# Patient Record
Sex: FEMALE | Race: WHITE | NOT HISPANIC OR LATINO | Employment: UNEMPLOYED | RURAL
[De-identification: names, ages, dates, MRNs, and addresses within clinical notes are randomized per-mention and may not be internally consistent; named-entity substitution may affect disease eponyms.]

---

## 2020-06-10 ENCOUNTER — HISTORICAL (OUTPATIENT)
Dept: ADMINISTRATIVE | Facility: HOSPITAL | Age: 35
End: 2020-06-10

## 2020-06-11 LAB
ADDRESS: NORMAL
ATTENDING PHYSICIAN NAME: NORMAL
COUNTY OF RESIDENCE: NORMAL
EMPLOYER NAME: NORMAL
FACILITY PHONE #: NORMAL
HX OF OCCUPATION: NORMAL
LEAD BLDV-MCNC: <1 MCG/DL (ref 0–4.9)
M HEALTH CARE PROVIDER PHONE: NORMAL
M PATIENT CITY: NORMAL
PHONE #: NORMAL
POSTAL CODE: NORMAL
PROVIDER CITY: NORMAL
PROVIDER POSTAL CODE: NORMAL
PROVIDER STATE: NORMAL
REFER PHYSICIAN ADDR: NORMAL
SPECIMEN SOURCE: NORMAL
STATE OF RESIDENCE: NORMAL

## 2020-06-16 LAB
LAB AP CLINICAL INFORMATION: NORMAL
LAB AP GENERAL CAT - HISTORICAL: NORMAL
LAB AP INTERPRETATION/RESULT - HISTORICAL: NORMAL
LAB AP SPECIMEN ADEQUACY - HISTORICAL: NORMAL
LAB AP SPECIMEN SUBMITTED - HISTORICAL: NORMAL

## 2020-09-03 ENCOUNTER — HISTORICAL (OUTPATIENT)
Dept: ADMINISTRATIVE | Facility: HOSPITAL | Age: 35
End: 2020-09-03

## 2020-10-05 ENCOUNTER — HISTORICAL (OUTPATIENT)
Dept: ADMINISTRATIVE | Facility: HOSPITAL | Age: 35
End: 2020-10-05

## 2020-10-05 LAB — GLUCOSE SERPL-MCNC: 129 MG/DL (ref 74–106)

## 2020-12-15 ENCOUNTER — HISTORICAL (OUTPATIENT)
Dept: ADMINISTRATIVE | Facility: HOSPITAL | Age: 35
End: 2020-12-15

## 2021-01-04 ENCOUNTER — HISTORICAL (OUTPATIENT)
Dept: ADMINISTRATIVE | Facility: HOSPITAL | Age: 36
End: 2021-01-04

## 2021-01-04 LAB
ALBUMIN SERPL BCP-MCNC: 2.1 G/DL (ref 3.5–5)
ALBUMIN/GLOB SERPL: 0.6 {RATIO}
ALP SERPL-CCNC: 158 U/L (ref 37–98)
ALT SERPL W P-5'-P-CCNC: 26 U/L (ref 13–56)
ANION GAP SERPL CALCULATED.3IONS-SCNC: 11 MMOL/L
AST SERPL W P-5'-P-CCNC: 19 U/L (ref 15–37)
BASOPHILS # BLD AUTO: 0.02 X10E3/UL (ref 0–0.2)
BASOPHILS NFR BLD AUTO: 0.2 % (ref 0–1)
BILIRUB SERPL-MCNC: 0.2 MG/DL (ref 0–1.2)
BILIRUB UR QL STRIP: NEGATIVE MG/DL
BUN SERPL-MCNC: 5 MG/DL (ref 7–18)
BUN/CREAT SERPL: 7.4
CALCIUM SERPL-MCNC: 8 MG/DL (ref 8.5–10.1)
CHLORIDE SERPL-SCNC: 108 MMOL/L (ref 98–107)
CLARITY UR: CLEAR
CO2 SERPL-SCNC: 24 MMOL/L (ref 21–32)
COLOR UR: YELLOW
CREAT SERPL-MCNC: 0.68 MG/DL (ref 0.55–1.02)
EOSINOPHIL # BLD AUTO: 0.16 X10E3/UL (ref 0–0.5)
EOSINOPHIL NFR BLD AUTO: 1.5 % (ref 1–4)
ERYTHROCYTE [DISTWIDTH] IN BLOOD BY AUTOMATED COUNT: 15.2 % (ref 11.5–14.5)
GLOBULIN SER-MCNC: 3.8 G/DL (ref 2–4)
GLUCOSE SERPL-MCNC: 121 MG/DL (ref 74–106)
GLUCOSE UR STRIP-MCNC: NEGATIVE MG/DL
HCT VFR BLD AUTO: 34.5 % (ref 38–47)
HGB BLD-MCNC: 10.9 G/DL (ref 12–16)
IMM GRANULOCYTES # BLD AUTO: 0.07 X10E3/UL (ref 0–0.04)
IMM GRANULOCYTES NFR BLD: 0.7 % (ref 0–0.4)
KETONES UR STRIP-SCNC: NEGATIVE MG/DL
LEUKOCYTE ESTERASE UR QL STRIP: NEGATIVE LEU/UL
LYMPHOCYTES # BLD AUTO: 2.33 X10E3/UL (ref 1–4.8)
LYMPHOCYTES NFR BLD AUTO: 21.9 % (ref 27–41)
MCH RBC QN AUTO: 27 PG (ref 27–31)
MCHC RBC AUTO-ENTMCNC: 31.6 G/DL (ref 32–36)
MCV RBC AUTO: 85.4 FL (ref 80–96)
MONOCYTES # BLD AUTO: 0.56 X10E3/UL (ref 0–0.8)
MONOCYTES NFR BLD AUTO: 5.3 % (ref 2–6)
MPC BLD CALC-MCNC: 10.4 FL (ref 9.4–12.4)
NEUTROPHILS # BLD AUTO: 7.5 X10E3/UL (ref 1.8–7.7)
NEUTROPHILS NFR BLD AUTO: 70.4 % (ref 53–65)
NITRITE UR QL STRIP: NEGATIVE
NRBC # BLD AUTO: 0 X10E3/UL (ref 0–0)
NRBC, AUTO (.00): 0 /100 (ref 0–0)
PH UR STRIP: 6 PH UNITS (ref 5–8)
PLATELET # BLD AUTO: 289 X10E3/UL (ref 150–400)
POTASSIUM SERPL-SCNC: 3.2 MMOL/L (ref 3.5–5.1)
PROT SERPL-MCNC: 5.9 G/DL (ref 6.4–8.2)
PROT UR QL STRIP: NEGATIVE MG/DL
RBC # BLD AUTO: 4.04 X10E6/UL (ref 4.2–5.4)
RBC # UR STRIP: NEGATIVE ERY/UL
SODIUM SERPL-SCNC: 140 MMOL/L (ref 136–145)
SP GR UR STRIP: 1.02 (ref 1–1.03)
URATE SERPL-MCNC: 4.8 MG/DL (ref 2.6–6)
UROBILINOGEN UR STRIP-ACNC: 0.2 EU/DL
WBC # BLD AUTO: 10.64 X10E3/UL (ref 4.5–11)

## 2021-01-14 ENCOUNTER — HISTORICAL (OUTPATIENT)
Dept: ADMINISTRATIVE | Facility: HOSPITAL | Age: 36
End: 2021-01-14

## 2021-01-20 ENCOUNTER — HISTORICAL (OUTPATIENT)
Dept: ADMINISTRATIVE | Facility: HOSPITAL | Age: 36
End: 2021-01-20

## 2021-01-20 LAB
HCT VFR BLD AUTO: 36.9 % (ref 38–47)
HGB BLD-MCNC: 11.3 G/DL (ref 12–16)
MCHC RBC AUTO-ENTMCNC: 30.6 G/DL (ref 32–36)

## 2021-01-21 ENCOUNTER — HISTORICAL (OUTPATIENT)
Dept: ADMINISTRATIVE | Facility: HOSPITAL | Age: 36
End: 2021-01-21

## 2021-01-21 LAB
BASOPHILS # BLD AUTO: 0.05 X10E3/UL (ref 0–0.2)
BASOPHILS NFR BLD AUTO: 0.4 % (ref 0–1)
EOSINOPHIL # BLD AUTO: 0.25 X10E3/UL (ref 0–0.5)
EOSINOPHIL NFR BLD AUTO: 1.8 % (ref 1–4)
ERYTHROCYTE [DISTWIDTH] IN BLOOD BY AUTOMATED COUNT: 16.6 % (ref 11.5–14.5)
HCT VFR BLD AUTO: 34.3 % (ref 38–47)
HGB BLD-MCNC: 10.4 G/DL (ref 12–16)
IMM GRANULOCYTES # BLD AUTO: 0.09 X10E3/UL (ref 0–0.04)
IMM GRANULOCYTES NFR BLD: 0.6 % (ref 0–0.4)
LYMPHOCYTES # BLD AUTO: 2.95 X10E3/UL (ref 1–4.8)
LYMPHOCYTES NFR BLD AUTO: 21 % (ref 27–41)
MCH RBC QN AUTO: 27.5 PG (ref 27–31)
MCHC RBC AUTO-ENTMCNC: 30.3 G/DL (ref 32–36)
MCV RBC AUTO: 90.7 FL (ref 80–96)
MONOCYTES # BLD AUTO: 0.73 X10E3/UL (ref 0–0.8)
MONOCYTES NFR BLD AUTO: 5.2 % (ref 2–6)
MPC BLD CALC-MCNC: 10.8 FL (ref 9.4–12.4)
NEUTROPHILS # BLD AUTO: 9.96 X10E3/UL (ref 1.8–7.7)
NEUTROPHILS NFR BLD AUTO: 71 % (ref 53–65)
NRBC # BLD AUTO: 0 X10E3/UL (ref 0–0)
NRBC, AUTO (.00): 0 /100 (ref 0–0)
PLATELET # BLD AUTO: 234 X10E3/UL (ref 150–400)
RBC # BLD AUTO: 3.78 X10E6/UL (ref 4.2–5.4)
WBC # BLD AUTO: 14.03 X10E3/UL (ref 4.5–11)

## 2021-01-22 LAB
ABO: NORMAL
ANTIBODY SCREEN: NORMAL
RH TYPE: NORMAL

## 2021-03-05 ENCOUNTER — HISTORICAL (OUTPATIENT)
Dept: ADMINISTRATIVE | Facility: HOSPITAL | Age: 36
End: 2021-03-05

## 2021-03-11 LAB
LAB AP CLINICAL INFORMATION: NORMAL
LAB AP GENERAL CAT - HISTORICAL: NORMAL
LAB AP INTERPRETATION/RESULT - HISTORICAL: NEGATIVE
LAB AP SPECIMEN ADEQUACY - HISTORICAL: NORMAL
LAB AP SPECIMEN SUBMITTED - HISTORICAL: NORMAL

## 2022-02-28 ENCOUNTER — OFFICE VISIT (OUTPATIENT)
Dept: OBSTETRICS AND GYNECOLOGY | Facility: CLINIC | Age: 37
End: 2022-02-28
Payer: MEDICAID

## 2022-02-28 ENCOUNTER — PROCEDURE VISIT (OUTPATIENT)
Dept: OBSTETRICS AND GYNECOLOGY | Facility: CLINIC | Age: 37
End: 2022-02-28
Payer: MEDICAID

## 2022-02-28 VITALS
HEART RATE: 65 BPM | DIASTOLIC BLOOD PRESSURE: 80 MMHG | SYSTOLIC BLOOD PRESSURE: 133 MMHG | TEMPERATURE: 98 F | HEIGHT: 69 IN | WEIGHT: 255 LBS | RESPIRATION RATE: 18 BRPM | BODY MASS INDEX: 37.77 KG/M2

## 2022-02-28 DIAGNOSIS — N91.1 SECONDARY AMENORRHEA: Primary | ICD-10-CM

## 2022-02-28 PROCEDURE — 81025 URINE PREGNANCY TEST: CPT | Mod: QW,,, | Performed by: OBSTETRICS & GYNECOLOGY

## 2022-02-28 PROCEDURE — 3075F SYST BP GE 130 - 139MM HG: CPT | Mod: CPTII,,, | Performed by: OBSTETRICS & GYNECOLOGY

## 2022-02-28 PROCEDURE — 76801 PR US, OB <14WKS, TRANSABD, SINGLE GESTATION: ICD-10-PCS | Mod: ,,, | Performed by: OBSTETRICS & GYNECOLOGY

## 2022-02-28 PROCEDURE — 99499 NO LOS: ICD-10-PCS | Mod: ,,, | Performed by: OBSTETRICS & GYNECOLOGY

## 2022-02-28 PROCEDURE — 0501F PRENATAL FLOW SHEET: CPT | Mod: ,,, | Performed by: OBSTETRICS & GYNECOLOGY

## 2022-02-28 PROCEDURE — 3079F PR MOST RECENT DIASTOLIC BLOOD PRESSURE 80-89 MM HG: ICD-10-PCS | Mod: CPTII,,, | Performed by: OBSTETRICS & GYNECOLOGY

## 2022-02-28 PROCEDURE — 76801 OB US < 14 WKS SINGLE FETUS: CPT | Mod: ,,, | Performed by: OBSTETRICS & GYNECOLOGY

## 2022-02-28 PROCEDURE — 81025 POCT URINE PREGNANCY: ICD-10-PCS | Mod: QW,,, | Performed by: OBSTETRICS & GYNECOLOGY

## 2022-02-28 PROCEDURE — 99499 UNLISTED E&M SERVICE: CPT | Mod: ,,, | Performed by: OBSTETRICS & GYNECOLOGY

## 2022-02-28 PROCEDURE — 1159F MED LIST DOCD IN RCRD: CPT | Mod: CPTII,,, | Performed by: OBSTETRICS & GYNECOLOGY

## 2022-02-28 PROCEDURE — 3079F DIAST BP 80-89 MM HG: CPT | Mod: CPTII,,, | Performed by: OBSTETRICS & GYNECOLOGY

## 2022-02-28 PROCEDURE — 3008F BODY MASS INDEX DOCD: CPT | Mod: CPTII,,, | Performed by: OBSTETRICS & GYNECOLOGY

## 2022-02-28 PROCEDURE — 1159F PR MEDICATION LIST DOCUMENTED IN MEDICAL RECORD: ICD-10-PCS | Mod: CPTII,,, | Performed by: OBSTETRICS & GYNECOLOGY

## 2022-02-28 PROCEDURE — 0501F PR INTITIAL PRENATAL CARE VISIT W/FLOW SHEET: ICD-10-PCS | Mod: ,,, | Performed by: OBSTETRICS & GYNECOLOGY

## 2022-02-28 PROCEDURE — 3075F PR MOST RECENT SYSTOLIC BLOOD PRESS GE 130-139MM HG: ICD-10-PCS | Mod: CPTII,,, | Performed by: OBSTETRICS & GYNECOLOGY

## 2022-02-28 PROCEDURE — 3008F PR BODY MASS INDEX (BMI) DOCUMENTED: ICD-10-PCS | Mod: CPTII,,, | Performed by: OBSTETRICS & GYNECOLOGY

## 2022-02-28 NOTE — PROGRESS NOTES
CC: Positive Pregnancy Test    HISTORY OF PRESENT ILLNESS:    Sidra Winter is a 36 y.o. female, ,  Presents today for a routine exam complaining of amenorrhea and positive home urine pregnancy test.  Patient's last menstrual period was 2022 (exact date).   She is not currently on any contraception.  Reports nausea. Reports breast tenderness. Denies vaginal bleeding and pelvic pain.      Review of patient's allergies indicates:   Allergen Reactions    Adhesive        Past Medical History:   Diagnosis Date    Scoliosis      Past Surgical History:   Procedure Laterality Date     SECTION  2021     OB History        1    Para   1    Term   1            AB        Living   1       SAB        IAB        Ectopic        Multiple        Live Births   1               Family History   Problem Relation Age of Onset    Diabetes Paternal Grandfather     Breast cancer Maternal Grandfather     Diabetes Maternal Grandfather     Ankylosing spondylitis Mother     Gestational diabetes Brother     Breast cancer Maternal Aunt      Social History     Tobacco Use    Smoking status: Former Smoker    Smokeless tobacco: Never Used   Substance Use Topics    Alcohol use: Not Currently     Comment: Socially    Drug use: Not Currently       No current outpatient medications on file.     No current facility-administered medications for this visit.       OB History    Para Term  AB Living   1 1 1     1   SAB IAB Ectopic Multiple Live Births           1      # Outcome Date GA Lbr Matt/2nd Weight Sex Delivery Anes PTL Lv   1 Term 21 39w0d  3.969 kg (8 lb 12 oz) F CS-Unspec   RODY          ROS:  GENERAL: No weight changes. No swelling. No fatigue. No fever.  CARDIOVASCULAR: No chest pain. No shortness of breath. No leg cramps.   NEUROLOGICAL: No headaches. No vision changes.  BREASTS: No pain. No lumps. No discharge.  ABDOMEN: No pain. No diarrhea. No  "constipation.  REPRODUCTIVE: No abnormal bleeding.   VULVA: No pain. No lesions. No itching.  VAGINA: No relaxation. No itching. No odor. No discharge. No lesions.  URINARY: No incontinence. No nocturia. No frequency. No dysuria.    /80 (BP Location: Right arm, Patient Position: Sitting)   Pulse 65   Temp 98.4 °F (36.9 °C)   Resp 18   Ht 5' 9" (1.753 m)   Wt 115.7 kg (255 lb)   LMP 01/13/2022 (Exact Date)   BMI 37.66 kg/m²     PE:  AFFECT: Calm, alert and oriented X 3. Interactive during exam  GENERAL: Appears well-nourished, well-developed, in no acute distress.  HEAD: Normocephalic, atruamatic  TEETH: Good dentition.  THYROID: No thyromegally   BREASTS: No masses, skin changes, nipple discharge or adenopathy bilaterally.  SKIN: Normal for race, warm, & dry. No lesions or rashes.  LUNGS: Easy and unlabored, clear to auscultation bilaterally.  HEART: Regular rate and rhythm   ABDOMEN: Soft and nontender without masses or organomegally.  VULVA: No lesions, masses or tenderness.  VAGINA: Moist and well rugated without lesions or discharge.  CERVIX: Moist and pink without lesions, discharge or tenderness.      UTERUS SIZE: 6 week size, nontender and without masses.  ADNEXA: No masses or tenderness.  ESTIMATE OF PELVIC CAPACITY: Adequate  EXTREMITIES: No cyanosis, clubbing or edema. No calf tenderness.  LYMPH NODES: No axillary or inguinal adenopathy.      ASSESSMENT:       ICD-10-CM ICD-9-CM    1. Secondary amenorrhea  N91.1 626.0 POCT urine pregnancy      Glucose, 1Hr Post Prandial      US OB <14 Wks, TransAbd, Single Gestation          Plan:     Amenorrhea  Positive urine pregnancy test (RAMESH: 10/20/2022, EGA: 6w 4d based on LMP)    -  Routine prenatal care    Nausea and vomiting in pregnancy    -  Education regarding lifestyle and dietary modifications    -  Advised use of B6/Unisom. Pt will notify us if no relief/worsening symptoms, will consider Zofran if needed.      1st TRIMESTER COUNSELING: " Discussed all, booklet provided:  Common complaints of pregnancy  HIV and other routine prenatal tests including  genetic screening  Risk factors identified by prenatal history  Oriented to practice - discussed anticipated course of prenatal care & indications for Ultrasound  Childbirth classes/Hospital facilities   Nutrition and weight gain counseling  Toxoplasmosis precautions (Cats/Raw Meat)  Sexual activity and exercise  Environmental/Work hazards  Travel  Tobacco (Ask, Advise, Assess, Assist, and Arrange), as well as alcohol and drug use  Use of any medications (Including supplements, Vitamins, Herbs, or OTC Drugs)  Domestic violence  Seat belt use      TERATOLOGY COUNSELING:   Discussed indications and options for aneuploidy screening - pamphlets given  Dating US today  FOLLOW-UP in 4 weeks with Dr. Mike Foster M.D., F[facility]    OB/GYN

## 2022-03-08 ENCOUNTER — PATIENT MESSAGE (OUTPATIENT)
Dept: OBSTETRICS AND GYNECOLOGY | Facility: CLINIC | Age: 37
End: 2022-03-08
Payer: MEDICAID

## 2022-03-28 ENCOUNTER — APPOINTMENT (OUTPATIENT)
Dept: RADIOLOGY | Facility: CLINIC | Age: 37
End: 2022-03-28
Attending: FAMILY MEDICINE
Payer: MEDICAID

## 2022-03-28 ENCOUNTER — OFFICE VISIT (OUTPATIENT)
Dept: OBSTETRICS AND GYNECOLOGY | Facility: CLINIC | Age: 37
End: 2022-03-28
Payer: MEDICAID

## 2022-03-28 ENCOUNTER — OFFICE VISIT (OUTPATIENT)
Dept: FAMILY MEDICINE | Facility: CLINIC | Age: 37
End: 2022-03-28
Payer: MEDICAID

## 2022-03-28 VITALS
HEART RATE: 76 BPM | TEMPERATURE: 98 F | HEIGHT: 69 IN | SYSTOLIC BLOOD PRESSURE: 128 MMHG | OXYGEN SATURATION: 99 % | DIASTOLIC BLOOD PRESSURE: 83 MMHG | WEIGHT: 255.19 LBS | BODY MASS INDEX: 37.8 KG/M2

## 2022-03-28 VITALS
SYSTOLIC BLOOD PRESSURE: 121 MMHG | WEIGHT: 253 LBS | DIASTOLIC BLOOD PRESSURE: 78 MMHG | HEIGHT: 69 IN | BODY MASS INDEX: 37.47 KG/M2 | HEART RATE: 70 BPM

## 2022-03-28 DIAGNOSIS — S99.922A FOOT INJURY, LEFT, INITIAL ENCOUNTER: ICD-10-CM

## 2022-03-28 DIAGNOSIS — Z72.51 RISK FOR SEXUALLY TRANSMITTED DISEASE: ICD-10-CM

## 2022-03-28 DIAGNOSIS — Z11.3 SCREENING FOR STDS (SEXUALLY TRANSMITTED DISEASES): ICD-10-CM

## 2022-03-28 DIAGNOSIS — S99.922A FOOT INJURY, LEFT, INITIAL ENCOUNTER: Primary | ICD-10-CM

## 2022-03-28 DIAGNOSIS — Z01.419 ROUTINE GYNECOLOGICAL EXAMINATION: Primary | ICD-10-CM

## 2022-03-28 DIAGNOSIS — Z12.4 SCREENING FOR MALIGNANT NEOPLASM OF THE CERVIX: ICD-10-CM

## 2022-03-28 DIAGNOSIS — Z11.4 SCREENING FOR HUMAN IMMUNODEFICIENCY VIRUS: ICD-10-CM

## 2022-03-28 LAB
B-HCG UR QL: POSITIVE
CANDIDA SPECIES: NEGATIVE
CTP QC/QA: YES
GARDNERELLA: NEGATIVE
HAV IGM SER QL: NORMAL
HBV CORE IGM SER QL: NORMAL
HBV SURFACE AG SERPL QL IA: NORMAL
HCV AB SER QL: NORMAL
HIV 1+O+2 AB SERPL QL: NORMAL
SYPHILIS AB INTERPRETATION: NORMAL
TRICHOMONAS: NEGATIVE

## 2022-03-28 PROCEDURE — 3008F PR BODY MASS INDEX (BMI) DOCUMENTED: ICD-10-PCS | Mod: CPTII,,, | Performed by: OBSTETRICS & GYNECOLOGY

## 2022-03-28 PROCEDURE — 3078F PR MOST RECENT DIASTOLIC BLOOD PRESSURE < 80 MM HG: ICD-10-PCS | Mod: CPTII,,, | Performed by: OBSTETRICS & GYNECOLOGY

## 2022-03-28 PROCEDURE — 87480 BACTERIAL VAGINOSIS: ICD-10-PCS | Mod: ,,, | Performed by: CLINICAL MEDICAL LABORATORY

## 2022-03-28 PROCEDURE — 87510 GARDNER VAG DNA DIR PROBE: CPT | Mod: ,,, | Performed by: CLINICAL MEDICAL LABORATORY

## 2022-03-28 PROCEDURE — 87591 N.GONORRHOEAE DNA AMP PROB: CPT | Mod: ,,, | Performed by: CLINICAL MEDICAL LABORATORY

## 2022-03-28 PROCEDURE — 87480 CANDIDA DNA DIR PROBE: CPT | Mod: ,,, | Performed by: CLINICAL MEDICAL LABORATORY

## 2022-03-28 PROCEDURE — 1159F MED LIST DOCD IN RCRD: CPT | Mod: CPTII,,, | Performed by: OBSTETRICS & GYNECOLOGY

## 2022-03-28 PROCEDURE — 3008F PR BODY MASS INDEX (BMI) DOCUMENTED: ICD-10-PCS | Mod: ,,, | Performed by: FAMILY MEDICINE

## 2022-03-28 PROCEDURE — 3078F DIAST BP <80 MM HG: CPT | Mod: CPTII,,, | Performed by: OBSTETRICS & GYNECOLOGY

## 2022-03-28 PROCEDURE — 1159F PR MEDICATION LIST DOCUMENTED IN MEDICAL RECORD: ICD-10-PCS | Mod: CPTII,,, | Performed by: OBSTETRICS & GYNECOLOGY

## 2022-03-28 PROCEDURE — 80074 HEPATITIS PANEL, ACUTE: ICD-10-PCS | Mod: ,,, | Performed by: CLINICAL MEDICAL LABORATORY

## 2022-03-28 PROCEDURE — 87510 BACTERIAL VAGINOSIS: ICD-10-PCS | Mod: ,,, | Performed by: CLINICAL MEDICAL LABORATORY

## 2022-03-28 PROCEDURE — 36415 PR COLLECTION VENOUS BLOOD,VENIPUNCTURE: ICD-10-PCS | Mod: 90,,, | Performed by: OBSTETRICS & GYNECOLOGY

## 2022-03-28 PROCEDURE — 3074F PR MOST RECENT SYSTOLIC BLOOD PRESSURE < 130 MM HG: ICD-10-PCS | Mod: ,,, | Performed by: FAMILY MEDICINE

## 2022-03-28 PROCEDURE — 86780 TREPONEMA PALLIDUM: CPT | Mod: ,,, | Performed by: CLINICAL MEDICAL LABORATORY

## 2022-03-28 PROCEDURE — 3008F BODY MASS INDEX DOCD: CPT | Mod: CPTII,,, | Performed by: OBSTETRICS & GYNECOLOGY

## 2022-03-28 PROCEDURE — 86780 TREPONEMA PALLIDUM (SYPHILIS) ANTIBODY: ICD-10-PCS | Mod: ,,, | Performed by: CLINICAL MEDICAL LABORATORY

## 2022-03-28 PROCEDURE — 73630 X-RAY EXAM OF FOOT: CPT | Mod: TC,RHCUB,FY,LT | Performed by: FAMILY MEDICINE

## 2022-03-28 PROCEDURE — 36415 COLL VENOUS BLD VENIPUNCTURE: CPT | Mod: 90,,, | Performed by: OBSTETRICS & GYNECOLOGY

## 2022-03-28 PROCEDURE — 80074 ACUTE HEPATITIS PANEL: CPT | Mod: ,,, | Performed by: CLINICAL MEDICAL LABORATORY

## 2022-03-28 PROCEDURE — 99214 OFFICE O/P EST MOD 30 MIN: CPT | Mod: ,,, | Performed by: OBSTETRICS & GYNECOLOGY

## 2022-03-28 PROCEDURE — 3008F BODY MASS INDEX DOCD: CPT | Mod: ,,, | Performed by: FAMILY MEDICINE

## 2022-03-28 PROCEDURE — 87591 CHLAMYDIA/GONORRHOEAE(GC), PCR: ICD-10-PCS | Mod: ,,, | Performed by: CLINICAL MEDICAL LABORATORY

## 2022-03-28 PROCEDURE — 87389 HIV 1 / 2 ANTIBODY: ICD-10-PCS | Mod: ,,, | Performed by: CLINICAL MEDICAL LABORATORY

## 2022-03-28 PROCEDURE — 99214 PR OFFICE/OUTPT VISIT, EST, LEVL IV, 30-39 MIN: ICD-10-PCS | Mod: ,,, | Performed by: OBSTETRICS & GYNECOLOGY

## 2022-03-28 PROCEDURE — 3074F SYST BP LT 130 MM HG: CPT | Mod: CPTII,,, | Performed by: OBSTETRICS & GYNECOLOGY

## 2022-03-28 PROCEDURE — 87660 TRICHOMONAS VAGIN DIR PROBE: CPT | Mod: ,,, | Performed by: CLINICAL MEDICAL LABORATORY

## 2022-03-28 PROCEDURE — 87624 HUMAN PAPILLOMAVIRUS (HPV): ICD-10-PCS | Mod: ,,, | Performed by: CLINICAL MEDICAL LABORATORY

## 2022-03-28 PROCEDURE — 87660 BACTERIAL VAGINOSIS: ICD-10-PCS | Mod: ,,, | Performed by: CLINICAL MEDICAL LABORATORY

## 2022-03-28 PROCEDURE — 3074F SYST BP LT 130 MM HG: CPT | Mod: ,,, | Performed by: FAMILY MEDICINE

## 2022-03-28 PROCEDURE — 99213 PR OFFICE/OUTPT VISIT, EST, LEVL III, 20-29 MIN: ICD-10-PCS | Mod: ,,, | Performed by: FAMILY MEDICINE

## 2022-03-28 PROCEDURE — 87624 HPV HI-RISK TYP POOLED RSLT: CPT | Mod: ,,, | Performed by: CLINICAL MEDICAL LABORATORY

## 2022-03-28 PROCEDURE — 73630 X-RAY EXAM OF FOOT: CPT | Mod: 26,LT,, | Performed by: RADIOLOGY

## 2022-03-28 PROCEDURE — 87389 HIV-1 AG W/HIV-1&-2 AB AG IA: CPT | Mod: ,,, | Performed by: CLINICAL MEDICAL LABORATORY

## 2022-03-28 PROCEDURE — 87491 CHLMYD TRACH DNA AMP PROBE: CPT | Mod: ,,, | Performed by: CLINICAL MEDICAL LABORATORY

## 2022-03-28 PROCEDURE — 73630 XR FOOT COMPLETE 3 VIEW LEFT: ICD-10-PCS | Mod: 26,LT,, | Performed by: RADIOLOGY

## 2022-03-28 PROCEDURE — 99213 OFFICE O/P EST LOW 20 MIN: CPT | Mod: ,,, | Performed by: FAMILY MEDICINE

## 2022-03-28 PROCEDURE — 3074F PR MOST RECENT SYSTOLIC BLOOD PRESSURE < 130 MM HG: ICD-10-PCS | Mod: CPTII,,, | Performed by: OBSTETRICS & GYNECOLOGY

## 2022-03-28 PROCEDURE — 87491 CHLAMYDIA/GONORRHOEAE(GC), PCR: ICD-10-PCS | Mod: ,,, | Performed by: CLINICAL MEDICAL LABORATORY

## 2022-03-28 NOTE — PROGRESS NOTES
"CC: Well woman exam    Sidra Winter is a 36 y.o. female  presents for well woman exam.  LMP: No LMP recorded..    Past Medical History:   Diagnosis Date    Scoliosis      Past Surgical History:   Procedure Laterality Date     SECTION  2021     Social History     Socioeconomic History    Marital status: Single   Tobacco Use    Smoking status: Former Smoker    Smokeless tobacco: Never Used   Substance and Sexual Activity    Alcohol use: Not Currently     Comment: Socially    Drug use: Not Currently    Sexual activity: Yes     Partners: Male     Birth control/protection: None     Family History   Problem Relation Age of Onset    Diabetes Paternal Grandfather     Breast cancer Maternal Grandfather     Diabetes Maternal Grandfather     Ankylosing spondylitis Mother     Gestational diabetes Brother     Breast cancer Maternal Aunt      OB History        1    Para   1    Term   1            AB        Living   1       SAB        IAB        Ectopic        Multiple        Live Births   1                 /78 (BP Location: Right arm, Patient Position: Sitting)   Pulse 70   Ht 5' 9" (1.753 m)   Wt 114.8 kg (253 lb)   BMI 37.36 kg/m²       ROS:  GENERAL: Denies weight gain or weight loss. Feeling well overall.   SKIN: Denies rash or lesions.   HEAD: Denies head injury or headache.   NODES: Denies enlarged lymph nodes.   CHEST: Denies chest pain or shortness of breath.   CARDIOVASCULAR: Denies palpitations or left sided chest pain.   ABDOMEN: No abdominal pain, constipation, diarrhea, nausea, vomiting or rectal bleeding.   URINARY: No frequency, dysuria, hematuria, or burning on urination.  REPRODUCTIVE: See HPI.   BREASTS: The patient performs breast self-examination and denies pain, lumps, or nipple discharge.   HEMATOLOGIC: No easy bruisability or excessive bleeding.   MUSCULOSKELETAL: Denies joint pain or swelling.   NEUROLOGIC: Denies syncope or weakness. "   PSYCHIATRIC: Denies depression, anxiety or mood swings.    PHYSICAL EXAM:  APPEARANCE: Well nourished, well developed, in no acute distress.  AFFECT: WNL, alert and oriented x 3  SKIN: No acne or hirsutism  NECK: Neck symmetric without masses or thyromegaly  NODES: No inguinal, cervical, axillary, or femoral lymph node enlargement  CHEST: Good respiratory effect  ABDOMEN: Soft.  No tenderness or masses.  No hepatosplenomegaly.  No hernias.  BREASTS: Symmetrical, no skin changes or visible lesions.  No palpable masses, nipple discharge bilaterally.  PELVIC: Normal external genitalia without lesions.  Normal hair distribution.  Adequate perineal body, normal urethral meatus.  Vagina moist and well rugated without lesions or discharge.  Cervix pink, without lesions, discharge or tenderness.  No significant cystocele or rectocele.  Bimanual exam shows uterus to be normal size, regular, mobile and nontender.  Adnexa without masses or tenderness.    EXTREMITIES: No edema.    Routine gynecological examination  -     ThinPrep Pap Test; Future; Expected date: 03/28/2022    Screening for malignant neoplasm of the cervix  -     ThinPrep Pap Test; Future; Expected date: 03/28/2022    Screening for STDs (sexually transmitted diseases)  -     Treponema Pallidum (Syphillis) Antibody; Future; Expected date: 03/28/2022  -     HIV 1/2 Ag/Ab (4th Gen); Future; Expected date: 03/28/2022  -     Hepatitis Panel, Acute; Future; Expected date: 03/28/2022  -     Chlamydia/GC, PCR; Future; Expected date: 03/28/2022  -     Bacterial Vaginosis; Future; Expected date: 03/28/2022    Screening for human immunodeficiency virus  -     HIV 1/2 Ag/Ab (4th Gen); Future; Expected date: 03/28/2022    Risk for sexually transmitted disease  -     Treponema Pallidum (Syphillis) Antibody; Future; Expected date: 03/28/2022  -     HIV 1/2 Ag/Ab (4th Gen); Future; Expected date: 03/28/2022  -     Hepatitis Panel, Acute; Future; Expected date: 03/28/2022  -      Chlamydia/GC, PCR; Future; Expected date: 03/28/2022  -     Bacterial Vaginosis; Future; Expected date: 03/28/2022    Dyspareunia, female    Retroverted uterus in first trimester        Patient was counseled today on A.C.S. Pap guidelines and recommendations for yearly pelvic exams, mammograms and monthly self breast exams; to see her PCP for other health maintenance.   Exercise regimen encouraged  Healthy food choices encouraged  Questions answered to desired level of satisfaction  Verbalized understanding to all information and instructions    Follow up in about 1 year (around 3/28/2023) for Annual Exam.

## 2022-03-28 NOTE — PROGRESS NOTES
Braxton Ornelas MD   [facility]  [address] [place] [address]     PATIENT NAME: Sidra Winter  : 1985  DATE: 3/28/22  MRN: [id number]      Billing Provider: Braxton Ornelas MD  Level of Service: ID OFFICE/OUTPT VISIT, EST, LEVL III, 20-29 MIN  Patient PCP Information     Provider PCP Type    Braxton Ornelas MD General          Reason for Visit / Chief Complaint: Foot Injury (Left 2nd toe bruised from falling over a toy Saturday evening. )         History of Present Illness / Problem Focused Workflow     Sidra Winter presents to the clinic with Foot Injury (Left 2nd toe bruised from falling over a toy Saturday evening. )     HPI    Review of Systems     Review of Systems   Constitutional: Negative for activity change, appetite change, fatigue and fever.   HENT: Negative for nasal congestion, ear pain, hearing loss, sinus pressure/congestion and sore throat.    Respiratory: Negative for cough, chest tightness and shortness of breath.    Cardiovascular: Negative for chest pain and palpitations.   Gastrointestinal: Negative for abdominal pain and fecal incontinence.   Genitourinary: Negative for bladder incontinence and difficulty urinating.   Musculoskeletal: Positive for gait problem and joint swelling (left toes). Negative for arthralgias.   Integumentary:  Negative for rash.   Neurological: Negative for dizziness and headaches.        Medical / Social / Family History     Past Medical History:   Diagnosis Date    Scoliosis        Past Surgical History:   Procedure Laterality Date     SECTION  2021       Social History  Sidra Winter  reports that she has quit smoking. She has never used smokeless tobacco. She reports previous alcohol use. She reports previous drug use.    Family History  Sidra Winter  family history includes Ankylosing spondylitis in her mother; Breast cancer in her maternal aunt and maternal grandfather;  Diabetes in her maternal grandfather and paternal grandfather; Gestational diabetes in her brother.    Medications and Allergies     Medications  Outpatient Medications Marked as Taking for the 3/28/22 encounter (Office Visit) with Braxton Ornelas MD   Medication Sig Dispense Refill    prenatal 25/iron fum/folic/dha (PRENATAL-1 ORAL) Take by mouth.         Allergies  Review of patient's allergies indicates:   Allergen Reactions    Adhesive        Physical Examination     Vitals:    03/28/22 1331   BP: 128/83   Pulse: 76   Temp: 97.7 °F (36.5 °C)     Physical Exam  Constitutional:       General: She is not in acute distress.     Appearance: She is not ill-appearing.   HENT:      Head: Normocephalic and atraumatic.      Right Ear: Tympanic membrane and ear canal normal.      Left Ear: Tympanic membrane and ear canal normal.      Nose: Nose normal. No congestion or rhinorrhea.   Eyes:      Pupils: Pupils are equal, round, and reactive to light.   Cardiovascular:      Rate and Rhythm: Normal rate and regular rhythm.      Pulses: Normal pulses.      Heart sounds: No murmur heard.  Pulmonary:      Effort: No respiratory distress.      Breath sounds: No wheezing, rhonchi or rales.   Abdominal:      General: Bowel sounds are normal.      Palpations: Abdomen is soft.      Tenderness: There is no abdominal tenderness.      Hernia: No hernia is present.   Musculoskeletal:         General: Deformity (left 2nd and 3rd toes bruizing at MTPs. tender to touch) present.      Cervical back: Normal range of motion and neck supple.   Lymphadenopathy:      Cervical: No cervical adenopathy.   Skin:     General: Skin is warm and dry.   Neurological:      Mental Status: She is alert.   Psychiatric:         Behavior: Behavior normal.         Thought Content: Thought content normal.          Assessment and Plan (including Health Maintenance)   :    Plan:         Health Maintenance Due   Topic Date Due    Hepatitis C Screening  Never  done    Cervical Cancer Screening  Never done    Lipid Panel  Never done    COVID-19 Vaccine (1) Never done    HIV Screening  Never done    TETANUS VACCINE  Never done    Influenza Vaccine (1) Never done       Problem List Items Addressed This Visit    None     Visit Diagnoses     Foot injury, left, initial encounter    -  Primary    Relevant Orders    X-Ray Foot Complete Left (Completed)        Foot injury, left, initial encounter  -     X-Ray Foot Complete Left; Future; Expected date: 03/28/2022       The patient has no Health Maintenance topics of status Not Due    Procedures     Future Appointments   Date Time Provider Department Center   4/11/2022  8:30 AM Mario Foster MD [facility]   4/4/2023  8:00 AM [facility]        No follow-ups on file.  No fracture. Symptomatic treatment    Signature:  Braxton Ornelas MD  [facility]  [address] [place] [address]  [phone] Phone  [phone] Fax    Date of encounter: 3/28/22

## 2022-03-29 LAB
CHLAMYDIA BY PCR: NEGATIVE
N. GONORRHOEAE (GC) BY PCR: NEGATIVE

## 2022-04-01 LAB
GH SERPL-MCNC: NORMAL NG/ML
INSULIN SERPL-ACNC: NORMAL U[IU]/ML
LAB AP CLINICAL INFORMATION: NORMAL
LAB AP GYN INTERPRETATION: NEGATIVE
LAB AP PAP DISCLAIMER COMMENTS: NORMAL
RENIN PLAS-CCNC: NORMAL NG/ML/H

## 2022-04-04 LAB
HPV 16: NEGATIVE
HPV 18: NEGATIVE
HPV OTHER: NEGATIVE

## 2022-04-11 ENCOUNTER — PROCEDURE VISIT (OUTPATIENT)
Dept: OBSTETRICS AND GYNECOLOGY | Facility: CLINIC | Age: 37
End: 2022-04-11
Payer: MEDICAID

## 2022-04-11 ENCOUNTER — ROUTINE PRENATAL (OUTPATIENT)
Dept: OBSTETRICS AND GYNECOLOGY | Facility: CLINIC | Age: 37
End: 2022-04-11
Payer: MEDICAID

## 2022-04-11 VITALS
DIASTOLIC BLOOD PRESSURE: 85 MMHG | BODY MASS INDEX: 37.18 KG/M2 | SYSTOLIC BLOOD PRESSURE: 125 MMHG | HEART RATE: 70 BPM | WEIGHT: 251.81 LBS

## 2022-04-11 DIAGNOSIS — Z36.9 UNSPECIFIED ANTENATAL SCREENING: ICD-10-CM

## 2022-04-11 DIAGNOSIS — O34.219 HISTORY OF CESAREAN DELIVERY, CURRENTLY PREGNANT: ICD-10-CM

## 2022-04-11 DIAGNOSIS — N91.1 SECONDARY AMENORRHEA: Primary | ICD-10-CM

## 2022-04-11 DIAGNOSIS — Z72.51 RISK FOR SEXUALLY TRANSMITTED DISEASE: ICD-10-CM

## 2022-04-11 DIAGNOSIS — Z11.3 SCREENING FOR STDS (SEXUALLY TRANSMITTED DISEASES): ICD-10-CM

## 2022-04-11 DIAGNOSIS — Z36.89 ENCOUNTER FOR OTHER SPECIFIED ANTENATAL SCREENING: ICD-10-CM

## 2022-04-11 DIAGNOSIS — Z11.4 SCREENING FOR HUMAN IMMUNODEFICIENCY VIRUS: ICD-10-CM

## 2022-04-11 DIAGNOSIS — Z3A.12 12 WEEKS GESTATION OF PREGNANCY: ICD-10-CM

## 2022-04-11 DIAGNOSIS — Z32.01 POSITIVE URINE PREGNANCY TEST: Primary | ICD-10-CM

## 2022-04-11 LAB
BASOPHILS # BLD AUTO: 0.04 K/UL (ref 0–0.2)
BASOPHILS NFR BLD AUTO: 0.5 % (ref 0–1)
BILIRUB SERPL-MCNC: NORMAL MG/DL
BLOOD, POC UA: NORMAL
CTP QC/QA: YES
DIFFERENTIAL METHOD BLD: ABNORMAL
EOSINOPHIL # BLD AUTO: 0.12 K/UL (ref 0–0.5)
EOSINOPHIL NFR BLD AUTO: 1.4 % (ref 1–4)
ERYTHROCYTE [DISTWIDTH] IN BLOOD BY AUTOMATED COUNT: 16 % (ref 11.5–14.5)
EST. AVERAGE GLUCOSE BLD GHB EST-MCNC: 94 MG/DL
GLUCOSE SERPL-MCNC: 105 MG/DL
GLUCOSE UR QL STRIP: NORMAL
HBA1C MFR BLD HPLC: 5.4 % (ref 4.5–6.6)
HBV SURFACE AG SERPL QL IA: NORMAL
HCT VFR BLD AUTO: 39 % (ref 38–47)
HGB BLD-MCNC: 11.9 G/DL (ref 12–16)
HIV 1+O+2 AB SERPL QL: NORMAL
IMM GRANULOCYTES # BLD AUTO: 0.03 K/UL (ref 0–0.04)
IMM GRANULOCYTES NFR BLD: 0.3 % (ref 0–0.4)
KETONES UR QL STRIP: NORMAL
LEUKOCYTE ESTERASE URINE, POC: NORMAL
LYMPHOCYTES # BLD AUTO: 2.4 K/UL (ref 1–4.8)
LYMPHOCYTES NFR BLD AUTO: 27.8 % (ref 27–41)
MCH RBC QN AUTO: 25.9 PG (ref 27–31)
MCHC RBC AUTO-ENTMCNC: 30.5 G/DL (ref 32–36)
MCV RBC AUTO: 84.8 FL (ref 80–96)
MONOCYTES # BLD AUTO: 0.44 K/UL (ref 0–0.8)
MONOCYTES NFR BLD AUTO: 5.1 % (ref 2–6)
MPC BLD CALC-MCNC: 10.6 FL (ref 9.4–12.4)
NEUTROPHILS # BLD AUTO: 5.6 K/UL (ref 1.8–7.7)
NEUTROPHILS NFR BLD AUTO: 64.9 % (ref 53–65)
NITRITE, POC UA: NORMAL
NRBC # BLD AUTO: 0 X10E3/UL
NRBC, AUTO (.00): 0 %
PH, POC UA: 6
PLATELET # BLD AUTO: 373 K/UL (ref 150–400)
POC (AMP) AMPHETAMINE: NEGATIVE
POC (BAR) BARBITURATES: NEGATIVE
POC (BUP) BUPRENORPHINE: NEGATIVE
POC (BZO) BENZODIAZEPINES: NEGATIVE
POC (COC) COCAINE: NEGATIVE
POC (MDMA) METHYLENEDIOXYMETHAMPHETAMINE 3,4: NEGATIVE
POC (MET) METHAMPHETAMINE: NEGATIVE
POC (MOP) OPIATES: NEGATIVE
POC (MTD) METHADONE: NEGATIVE
POC (OXY) OXYCODONE: NEGATIVE
POC (PCP) PHENCYCLIDINE: NEGATIVE
POC (TCA) TRICYCLIC ANTIDEPRESSANTS: NEGATIVE
POC TEMPERATURE (URINE): 92
POC THC: NEGATIVE
PROTEIN, POC: NORMAL
RBC # BLD AUTO: 4.6 M/UL (ref 4.2–5.4)
RUBV IGG SER-ACNC: NORMAL [IU]/ML
SPECIFIC GRAVITY, POC UA: 1.01
SYPHILIS AB INTERPRETATION: NORMAL
UROBILINOGEN, POC UA: 0.2
WBC # BLD AUTO: 8.63 K/UL (ref 4.5–11)

## 2022-04-11 PROCEDURE — 87591 CHLAMYDIA/GONORRHOEAE(GC), PCR: ICD-10-PCS | Mod: ,,, | Performed by: CLINICAL MEDICAL LABORATORY

## 2022-04-11 PROCEDURE — 86900 TYPE & SCREEN: ICD-10-PCS | Mod: ,,, | Performed by: CLINICAL MEDICAL LABORATORY

## 2022-04-11 PROCEDURE — 86780 TREPONEMA PALLIDUM: CPT | Mod: ,,, | Performed by: CLINICAL MEDICAL LABORATORY

## 2022-04-11 PROCEDURE — 86762 RUBELLA ANTIBODY SCREEN: ICD-10-PCS | Mod: ,,, | Performed by: CLINICAL MEDICAL LABORATORY

## 2022-04-11 PROCEDURE — 86780 TREPONEMA PALLIDUM (SYPHILIS) ANTIBODY: ICD-10-PCS | Mod: ,,, | Performed by: CLINICAL MEDICAL LABORATORY

## 2022-04-11 PROCEDURE — 76801 OB US < 14 WKS SINGLE FETUS: CPT | Mod: ,,, | Performed by: OBSTETRICS & GYNECOLOGY

## 2022-04-11 PROCEDURE — 87389 HIV-1 AG W/HIV-1&-2 AB AG IA: CPT | Mod: ,,, | Performed by: CLINICAL MEDICAL LABORATORY

## 2022-04-11 PROCEDURE — 87661 TRICHOMONAS VAGINALIS AMPLIF: CPT | Mod: ,,, | Performed by: CLINICAL MEDICAL LABORATORY

## 2022-04-11 PROCEDURE — 86850 RBC ANTIBODY SCREEN: CPT | Mod: ,,, | Performed by: CLINICAL MEDICAL LABORATORY

## 2022-04-11 PROCEDURE — 87340 HEPATITIS B SURFACE ANTIGEN: ICD-10-PCS | Mod: ,,, | Performed by: CLINICAL MEDICAL LABORATORY

## 2022-04-11 PROCEDURE — 86901 TYPE & SCREEN: ICD-10-PCS | Mod: ,,, | Performed by: CLINICAL MEDICAL LABORATORY

## 2022-04-11 PROCEDURE — 87491 CHLAMYDIA/GONORRHOEAE(GC), PCR: ICD-10-PCS | Mod: ,,, | Performed by: CLINICAL MEDICAL LABORATORY

## 2022-04-11 PROCEDURE — 87491 CHLMYD TRACH DNA AMP PROBE: CPT | Mod: ,,, | Performed by: CLINICAL MEDICAL LABORATORY

## 2022-04-11 PROCEDURE — 86762 RUBELLA ANTIBODY: CPT | Mod: ,,, | Performed by: CLINICAL MEDICAL LABORATORY

## 2022-04-11 PROCEDURE — 82950 GLUCOSE, 1HR POST PRANDIAL: ICD-10-PCS | Mod: ,,, | Performed by: CLINICAL MEDICAL LABORATORY

## 2022-04-11 PROCEDURE — 82950 GLUCOSE TEST: CPT | Mod: ,,, | Performed by: CLINICAL MEDICAL LABORATORY

## 2022-04-11 PROCEDURE — 80305 DRUG TEST PRSMV DIR OPT OBS: CPT | Mod: QW,,, | Performed by: OBSTETRICS & GYNECOLOGY

## 2022-04-11 PROCEDURE — 99499 UNLISTED E&M SERVICE: CPT | Mod: ,,, | Performed by: OBSTETRICS & GYNECOLOGY

## 2022-04-11 PROCEDURE — 87086 URINE CULTURE/COLONY COUNT: CPT | Mod: ,,, | Performed by: CLINICAL MEDICAL LABORATORY

## 2022-04-11 PROCEDURE — 36415 COLL VENOUS BLD VENIPUNCTURE: CPT | Mod: 90,,, | Performed by: OBSTETRICS & GYNECOLOGY

## 2022-04-11 PROCEDURE — 87591 N.GONORRHOEAE DNA AMP PROB: CPT | Mod: ,,, | Performed by: CLINICAL MEDICAL LABORATORY

## 2022-04-11 PROCEDURE — 87661 TRICHOMONAS VAGINALIS BY PCR: ICD-10-PCS | Mod: ,,, | Performed by: CLINICAL MEDICAL LABORATORY

## 2022-04-11 PROCEDURE — 0502F SUBSEQUENT PRENATAL CARE: CPT | Mod: ,,, | Performed by: OBSTETRICS & GYNECOLOGY

## 2022-04-11 PROCEDURE — 83036 HEMOGLOBIN GLYCOSYLATED A1C: CPT | Mod: ,,, | Performed by: CLINICAL MEDICAL LABORATORY

## 2022-04-11 PROCEDURE — 86850 TYPE & SCREEN: ICD-10-PCS | Mod: ,,, | Performed by: CLINICAL MEDICAL LABORATORY

## 2022-04-11 PROCEDURE — 87340 HEPATITIS B SURFACE AG IA: CPT | Mod: ,,, | Performed by: CLINICAL MEDICAL LABORATORY

## 2022-04-11 PROCEDURE — 85025 COMPLETE CBC W/AUTO DIFF WBC: CPT | Mod: ,,, | Performed by: CLINICAL MEDICAL LABORATORY

## 2022-04-11 PROCEDURE — 36415 PR COLLECTION VENOUS BLOOD,VENIPUNCTURE: ICD-10-PCS | Mod: 90,,, | Performed by: OBSTETRICS & GYNECOLOGY

## 2022-04-11 PROCEDURE — 85660 RBC SICKLE CELL TEST: CPT | Mod: ,,, | Performed by: CLINICAL MEDICAL LABORATORY

## 2022-04-11 PROCEDURE — 99499 NO LOS: ICD-10-PCS | Mod: ,,, | Performed by: OBSTETRICS & GYNECOLOGY

## 2022-04-11 PROCEDURE — 80305 POCT URINE DRUG SCREEN PRESUMP: ICD-10-PCS | Mod: QW,,, | Performed by: OBSTETRICS & GYNECOLOGY

## 2022-04-11 PROCEDURE — 0502F PR SUBSEQUENT PRENATAL CARE: ICD-10-PCS | Mod: ,,, | Performed by: OBSTETRICS & GYNECOLOGY

## 2022-04-11 PROCEDURE — 85025 CBC WITH DIFFERENTIAL: ICD-10-PCS | Mod: ,,, | Performed by: CLINICAL MEDICAL LABORATORY

## 2022-04-11 PROCEDURE — 86900 BLOOD TYPING SEROLOGIC ABO: CPT | Mod: ,,, | Performed by: CLINICAL MEDICAL LABORATORY

## 2022-04-11 PROCEDURE — 86901 BLOOD TYPING SEROLOGIC RH(D): CPT | Mod: ,,, | Performed by: CLINICAL MEDICAL LABORATORY

## 2022-04-11 PROCEDURE — 87389 HIV 1 / 2 ANTIBODY: ICD-10-PCS | Mod: ,,, | Performed by: CLINICAL MEDICAL LABORATORY

## 2022-04-11 PROCEDURE — 83036 HEMOGLOBIN A1C: ICD-10-PCS | Mod: ,,, | Performed by: CLINICAL MEDICAL LABORATORY

## 2022-04-11 PROCEDURE — 76801 PR US, OB <14WKS, TRANSABD, SINGLE GESTATION: ICD-10-PCS | Mod: ,,, | Performed by: OBSTETRICS & GYNECOLOGY

## 2022-04-11 PROCEDURE — 85660 SICKLE CELL SCREEN: ICD-10-PCS | Mod: ,,, | Performed by: CLINICAL MEDICAL LABORATORY

## 2022-04-11 PROCEDURE — 87086 CULTURE, URINE: ICD-10-PCS | Mod: ,,, | Performed by: CLINICAL MEDICAL LABORATORY

## 2022-04-11 NOTE — PROGRESS NOTES
36 y.o. female  at Unknown   Reports fetal movement or fluttering. Denies any vaginal bleeding, leakage of fluid, cramping, contractions, or pressure.   She complains of vaginal spotting after intercourse.  Pt states she is doing well without any concerns.       Daily fetal kick counts, bleeding, and  labor/labor precautions discussed.  Questions answered to desired level of satisfaction  Verbalized understanding to all information and instructions provided.    Total weight gain/weight loss in pregnancy: -1.452 kg (-3 lb 3.2 oz)     A: Unknown     ICD-10-CM ICD-9-CM    1. Positive urine pregnancy test  Z32.01 V72.42 POCT Urinalysis      US OB <14 Wks, TransAbd, Single Gestation   2. Unspecified  screening  Z36.9 V28.9 POCT Urinalysis      US OB <14 Wks, TransAbd, Single Gestation      CBC Auto Differential      Type & Screen      Rubella Antibody Screen      Hepatitis B Surface Antigen      Sickle Cell Screen      Treponema Pallidum (Syphillis) Antibody      Chlamydia/GC, PCR      Hemoglobin A1C      Trichomonas vaginalis by PCR      Urine culture      HIV 1/2 Ag/Ab (4th Gen)      POCT Urine Drug Screen Presump      Misc Sendout Test, Blood Mignon      Glucose, 1Hr Post Prandial      Misc Sendout Test, Blood Mignon   3. Screening for STDs (sexually transmitted diseases)  Z11.3 V74.5 Hepatitis B Surface Antigen      Treponema Pallidum (Syphillis) Antibody      Chlamydia/GC, PCR      Trichomonas vaginalis by PCR      HIV 1/2 Ag/Ab (4th Gen)   4. Screening for human immunodeficiency virus  Z11.4 V73.89 HIV 1/2 Ag/Ab (4th Gen)   5. Risk for sexually transmitted disease  Z72.51 V69.2 Hepatitis B Surface Antigen      Treponema Pallidum (Syphillis) Antibody      Chlamydia/GC, PCR      Trichomonas vaginalis by PCR      HIV 1/2 Ag/Ab (4th Gen)   6. Encounter for other specified  screening  Z36.89 V28.9 POCT Urine Drug Screen Presump   7. BMI 37.0-37.9, adult  Z68.37 V85.37 Glucose, 1Hr Post  Prandial   8. 12 weeks gestation of pregnancy  Z3A.12 V22.2  OB <14 Wks, TransAbd, Single Gestation      CBC Auto Differential      Type & Screen      Rubella Antibody Screen      Hepatitis B Surface Antigen      Sickle Cell Screen      Treponema Pallidum (Syphillis) Antibody      Chlamydia/GC, PCR      Hemoglobin A1C      Trichomonas vaginalis by PCR      Urine culture      HIV 1/2 Ag/Ab (4th Gen)      Misc Sendout Test, Blood Mignon      Glucose, 1Hr Post Prandial      Misc Sendout Test, Blood Mignon   9. History of  delivery, currently pregnant  O34.219 654.20

## 2022-04-12 LAB
CHLAMYDIA BY PCR: NEGATIVE
HGB S BLD QL SOLY: NEGATIVE
INDIRECT COOMBS: NORMAL
N. GONORRHOEAE (GC) BY PCR: NEGATIVE
RH BLD: NORMAL
TRICHOMONAS NAT: NEGATIVE

## 2022-04-13 LAB — UA COMPLETE W REFLEX CULTURE PNL UR: NO GROWTH

## 2022-04-25 ENCOUNTER — TELEPHONE (OUTPATIENT)
Dept: OBSTETRICS AND GYNECOLOGY | Facility: CLINIC | Age: 37
End: 2022-04-25
Payer: MEDICAID

## 2022-04-25 NOTE — PROCEDURES
Procedures   Ultrasound note:    Uterus 8.68 x 5.57 x 5.95 cm    Crown-rump length 0.45 cm  Fetal heart rate 112 beats per minute    Right ovary 3.4 x 2.8 x 2.2 cm    Impression:  IUP with fetal pole  Right ovarian simple cyst 2.3 x 1.8 x 1.4 cm  Estimated delivery by last menstrual period October 20, 2022  Estimated gestational age by last menstrual period 6 weeks 4 day

## 2022-04-29 NOTE — PROCEDURES
Procedures   Ultrasound note:    Uterus 14.87 x 7.32 x 9.34 cm  Crown-rump length 12 weeks 2 day  Fetal heart 150 beats per minute    Impression:  IUP with fetal heart tones  Estimated delivery October 22, 2022  Estimated gestational age 12 weeks 2 day

## 2022-05-02 ENCOUNTER — ROUTINE PRENATAL (OUTPATIENT)
Dept: OBSTETRICS AND GYNECOLOGY | Facility: CLINIC | Age: 37
End: 2022-05-02
Payer: MEDICAID

## 2022-05-02 VITALS
DIASTOLIC BLOOD PRESSURE: 83 MMHG | BODY MASS INDEX: 36.62 KG/M2 | WEIGHT: 248 LBS | HEART RATE: 58 BPM | SYSTOLIC BLOOD PRESSURE: 121 MMHG

## 2022-05-02 DIAGNOSIS — Z36.9 UNSPECIFIED ANTENATAL SCREENING: ICD-10-CM

## 2022-05-02 DIAGNOSIS — Z3A.15 15 WEEKS GESTATION OF PREGNANCY: Primary | ICD-10-CM

## 2022-05-02 DIAGNOSIS — O34.219 HISTORY OF CESAREAN DELIVERY, CURRENTLY PREGNANT: ICD-10-CM

## 2022-05-02 LAB
BILIRUB SERPL-MCNC: NEGATIVE MG/DL
BLOOD, POC UA: NEGATIVE
GLUCOSE UR QL STRIP: NEGATIVE
KETONES UR QL STRIP: NEGATIVE
LEUKOCYTE ESTERASE URINE, POC: NEGATIVE
NITRITE, POC UA: NEGATIVE
PH, POC UA: 6
PROTEIN, POC: NEGATIVE
SPECIFIC GRAVITY, POC UA: 1.02
UROBILINOGEN, POC UA: 0.2

## 2022-05-02 PROCEDURE — 0502F SUBSEQUENT PRENATAL CARE: CPT | Mod: ,,, | Performed by: OBSTETRICS & GYNECOLOGY

## 2022-05-02 PROCEDURE — 0502F PR SUBSEQUENT PRENATAL CARE: ICD-10-PCS | Mod: ,,, | Performed by: OBSTETRICS & GYNECOLOGY

## 2022-05-02 NOTE — PROGRESS NOTES
36 y.o. female  at 15w4d   Reports fetal movement or fluttering. Denies any vaginal bleeding, leakage of fluid, cramping, contractions, or pressure.   She complains of no problems  Pt states she is doing well without any concerns.       Daily fetal kick counts, bleeding, and  labor/labor precautions discussed.  Questions answered to desired level of satisfaction  Verbalized understanding to all information and instructions provided.    Total weight gain/weight loss in pregnancy: -3.175 kg (-7 lb)     A: 15w4d     ICD-10-CM ICD-9-CM    1. 15 weeks gestation of pregnancy  Z3A.15 V22.2 POCT Urinalysis      US OB 14+ Wks, TransAbd, Single Gestation   2. Unspecified  screening  Z36.9 V28.9 POCT Urinalysis   3. History of  delivery, currently pregnant  O34.219 654.20    \

## 2022-05-25 ENCOUNTER — HOSPITAL ENCOUNTER (OUTPATIENT)
Dept: RADIOLOGY | Facility: HOSPITAL | Age: 37
Discharge: HOME | End: 2022-05-25
Attending: OBSTETRICS & GYNECOLOGY
Payer: MEDICAID

## 2022-05-25 DIAGNOSIS — Z3A.15 15 WEEKS GESTATION OF PREGNANCY: ICD-10-CM

## 2022-05-25 PROCEDURE — 76805 US OB 14+ WEEKS, TRANSABDOM, SINGLE GESTATION: ICD-10-PCS | Mod: 26,,,

## 2022-05-25 PROCEDURE — 76805 OB US >/= 14 WKS SNGL FETUS: CPT | Mod: TC

## 2022-05-25 PROCEDURE — 76805 OB US >/= 14 WKS SNGL FETUS: CPT | Mod: 26,,,

## 2022-05-31 ENCOUNTER — ROUTINE PRENATAL (OUTPATIENT)
Dept: OBSTETRICS AND GYNECOLOGY | Facility: CLINIC | Age: 37
End: 2022-05-31
Payer: MEDICAID

## 2022-05-31 VITALS
SYSTOLIC BLOOD PRESSURE: 121 MMHG | WEIGHT: 251 LBS | DIASTOLIC BLOOD PRESSURE: 79 MMHG | HEART RATE: 65 BPM | BODY MASS INDEX: 37.07 KG/M2

## 2022-05-31 DIAGNOSIS — O34.219 HISTORY OF CESAREAN DELIVERY, CURRENTLY PREGNANT: ICD-10-CM

## 2022-05-31 DIAGNOSIS — Z36.9 UNSPECIFIED ANTENATAL SCREENING: ICD-10-CM

## 2022-05-31 DIAGNOSIS — Z36.9 VISIT FOR PRENATAL SCREENING: ICD-10-CM

## 2022-05-31 DIAGNOSIS — Z3A.19 19 WEEKS GESTATION OF PREGNANCY: Primary | ICD-10-CM

## 2022-05-31 LAB
BILIRUB SERPL-MCNC: NEGATIVE MG/DL
BLOOD, POC UA: NORMAL
GLUCOSE UR QL STRIP: NEGATIVE
KETONES UR QL STRIP: NEGATIVE
LEUKOCYTE ESTERASE URINE, POC: NEGATIVE
NITRITE, POC UA: NEGATIVE
PH, POC UA: 7
PROTEIN, POC: NEGATIVE
SPECIFIC GRAVITY, POC UA: 1.02
UROBILINOGEN, POC UA: 0.2

## 2022-05-31 PROCEDURE — 0502F SUBSEQUENT PRENATAL CARE: CPT | Mod: ,,, | Performed by: OBSTETRICS & GYNECOLOGY

## 2022-05-31 PROCEDURE — 0502F PR SUBSEQUENT PRENATAL CARE: ICD-10-PCS | Mod: ,,, | Performed by: OBSTETRICS & GYNECOLOGY

## 2022-05-31 NOTE — PROGRESS NOTES
36 y.o. female  at 19w5d   Reports fetal movement or fluttering. Denies any vaginal bleeding, leakage of fluid, cramping, contractions, or pressure.   She complains of nothing. Pt had Anatomy scan on 2022.  Pt states she is doing well without any concerns.       Daily fetal kick counts, bleeding, and  labor/labor precautions discussed.  Questions answered to desired level of satisfaction  Verbalized understanding to all information and instructions provided.    Total weight gain/weight loss in pregnancy: -1.814 kg (-4 lb)     A: 19w5d     ICD-10-CM ICD-9-CM    1. 19 weeks gestation of pregnancy  Z3A.19 V22.2 POCT Urinalysis   2. Unspecified  screening  Z36.9 V28.9 POCT Urinalysis   3. Visit for prenatal screening  Z36.9 V28.9 POCT Urinalysis   4. History of  delivery, currently pregnant  O34.219 654.20

## 2022-06-07 ENCOUNTER — OFFICE VISIT (OUTPATIENT)
Dept: FAMILY MEDICINE | Facility: CLINIC | Age: 37
End: 2022-06-07
Payer: MEDICAID

## 2022-06-07 VITALS
HEART RATE: 81 BPM | WEIGHT: 251 LBS | DIASTOLIC BLOOD PRESSURE: 82 MMHG | TEMPERATURE: 98 F | BODY MASS INDEX: 37.18 KG/M2 | OXYGEN SATURATION: 99 % | SYSTOLIC BLOOD PRESSURE: 122 MMHG | HEIGHT: 69 IN

## 2022-06-07 DIAGNOSIS — J01.00 ACUTE NON-RECURRENT MAXILLARY SINUSITIS: Primary | ICD-10-CM

## 2022-06-07 DIAGNOSIS — R05.9 COUGH: ICD-10-CM

## 2022-06-07 PROCEDURE — 3074F PR MOST RECENT SYSTOLIC BLOOD PRESSURE < 130 MM HG: ICD-10-PCS | Mod: ,,, | Performed by: FAMILY MEDICINE

## 2022-06-07 PROCEDURE — 3008F BODY MASS INDEX DOCD: CPT | Mod: ,,, | Performed by: FAMILY MEDICINE

## 2022-06-07 PROCEDURE — 3008F PR BODY MASS INDEX (BMI) DOCUMENTED: ICD-10-PCS | Mod: ,,, | Performed by: FAMILY MEDICINE

## 2022-06-07 PROCEDURE — 3074F SYST BP LT 130 MM HG: CPT | Mod: ,,, | Performed by: FAMILY MEDICINE

## 2022-06-07 PROCEDURE — 99213 PR OFFICE/OUTPT VISIT, EST, LEVL III, 20-29 MIN: ICD-10-PCS | Mod: ,,, | Performed by: FAMILY MEDICINE

## 2022-06-07 PROCEDURE — 99213 OFFICE O/P EST LOW 20 MIN: CPT | Mod: ,,, | Performed by: FAMILY MEDICINE

## 2022-06-07 RX ORDER — AMOXICILLIN 500 MG/1
500 TABLET, FILM COATED ORAL EVERY 12 HOURS
Qty: 20 TABLET | Refills: 0 | Status: SHIPPED | OUTPATIENT
Start: 2022-06-07 | End: 2022-06-17

## 2022-06-26 NOTE — PROGRESS NOTES
"   Braxton Ornelas MD   [facility]  [address] [place] [address]     PATIENT NAME: Sidra Winter  : 1985  DATE: 22  MRN: [id number]      Billing Provider: Braxton Ornelas MD  Level of Service: [place] OFFICE/OUTPT VISIT, EST, LEVL III, 20-29 MIN  Patient PCP Information     Provider PCP Type    Braxton Ornelas MD General          Reason for Visit / Chief Complaint: Sinus Problem (C/o runny nose, sore throat, postnasal drip and a "slight cough")         History of Present Illness / Problem Focused Workflow     Sidra Winter presents to the clinic with Sinus Problem (C/o runny nose, sore throat, postnasal drip and a "slight cough")     HPI    Review of Systems     Review of Systems   Constitutional: Negative for activity change, appetite change, fatigue and fever.   HENT: Positive for nasal congestion, sinus pressure/congestion and sore throat. Negative for ear pain and hearing loss.    Respiratory: Positive for cough. Negative for chest tightness and shortness of breath.    Cardiovascular: Negative for chest pain and palpitations.   Gastrointestinal: Negative for abdominal pain and fecal incontinence.   Genitourinary: Negative for bladder incontinence and difficulty urinating.   Musculoskeletal: Negative for arthralgias.   Integumentary:  Negative for rash.   Neurological: Negative for dizziness and headaches.        Medical / Social / Family History     Past Medical History:   Diagnosis Date    Scoliosis        Past Surgical History:   Procedure Laterality Date     SECTION  2021       Social History  Sidra Winter  reports that she has quit smoking. She has never used smokeless tobacco. She reports previous alcohol use. She reports previous drug use.    Family History  Sidra Winter  family history includes Ankylosing spondylitis in her mother; Breast cancer in her maternal aunt and maternal grandfather; Diabetes in her maternal " grandfather and paternal grandfather; Gestational diabetes in her brother.    Medications and Allergies     Medications  Outpatient Medications Marked as Taking for the 6/7/22 encounter (Office Visit) with Braxton Ornelas MD   Medication Sig Dispense Refill    prenatal 25/iron fum/folic/dha (PRENATAL-1 ORAL) Take by mouth.         Allergies  Review of patient's allergies indicates:   Allergen Reactions    Adhesive        Physical Examination     Vitals:    06/07/22 0737   BP: 122/82   Pulse: 81   Temp: 98.1 °F (36.7 °C)     Physical Exam  Constitutional:       Appearance: Normal appearance.   HENT:      Head: Normocephalic and atraumatic.      Right Ear: Tympanic membrane normal.      Left Ear: Tympanic membrane normal.      Nose: Congestion and rhinorrhea present.      Mouth/Throat:      Pharynx: Posterior oropharyngeal erythema present.   Eyes:      Pupils: Pupils are equal, round, and reactive to light.   Cardiovascular:      Rate and Rhythm: Normal rate and regular rhythm.      Pulses: Normal pulses.      Heart sounds: Normal heart sounds.   Pulmonary:      Breath sounds: No wheezing or rhonchi.   Abdominal:      Palpations: Abdomen is soft.   Lymphadenopathy:      Cervical: Cervical adenopathy present.   Skin:     General: Skin is warm and dry.   Neurological:      Mental Status: She is alert.          Assessment and Plan (including Health Maintenance)   :    Plan:         Health Maintenance Due   Topic Date Due    Lipid Panel  Never done    COVID-19 Vaccine (1) Never done    TETANUS VACCINE  Never done       Problem List Items Addressed This Visit    None     Visit Diagnoses     Acute non-recurrent maxillary sinusitis    -  Primary    Cough            Acute non-recurrent maxillary sinusitis    Cough    Other orders  -     amoxicillin (AMOXIL) 500 MG Tab; Take 1 tablet (500 mg total) by mouth every 12 (twelve) hours. for 10 days  Dispense: 20 tablet; Refill: 0       Health Maintenance Topics with due  status: Not Due       Topic Last Completion Date    Cervical Cancer Screening 03/28/2022    Influenza Vaccine Not Due       Procedures     Future Appointments   Date Time Provider Department Center   6/28/2022  1:15 PM Mario Foster MD [facility]   4/4/2023  8:00 AM Mario Foster MD [facility]        No follow-ups on file.       Signature:  Braxton Ornelas MD  [facility]  [address] [place] [address]  [phone] Phone  [phone] Fax    Date of encounter: 6/7/22

## 2022-06-28 ENCOUNTER — ROUTINE PRENATAL (OUTPATIENT)
Dept: OBSTETRICS AND GYNECOLOGY | Facility: CLINIC | Age: 37
End: 2022-06-28
Payer: MEDICAID

## 2022-06-28 VITALS
DIASTOLIC BLOOD PRESSURE: 75 MMHG | BODY MASS INDEX: 37.51 KG/M2 | WEIGHT: 254 LBS | SYSTOLIC BLOOD PRESSURE: 123 MMHG | HEART RATE: 85 BPM

## 2022-06-28 DIAGNOSIS — Z3A.23 23 WEEKS GESTATION OF PREGNANCY: Primary | ICD-10-CM

## 2022-06-28 DIAGNOSIS — O34.219 HISTORY OF CESAREAN DELIVERY, CURRENTLY PREGNANT: ICD-10-CM

## 2022-06-28 DIAGNOSIS — Z36.9 UNSPECIFIED ANTENATAL SCREENING: ICD-10-CM

## 2022-06-28 LAB
BILIRUB SERPL-MCNC: NORMAL MG/DL
BLOOD, POC UA: NORMAL
GLUCOSE UR QL STRIP: NORMAL
KETONES UR QL STRIP: NORMAL
LEUKOCYTE ESTERASE URINE, POC: NORMAL
NITRITE, POC UA: NORMAL
PH, POC UA: 6.5
PROTEIN, POC: NORMAL
SPECIFIC GRAVITY, POC UA: 1.02
UROBILINOGEN, POC UA: 0.2

## 2022-06-28 PROCEDURE — 0502F PR SUBSEQUENT PRENATAL CARE: ICD-10-PCS | Mod: ,,, | Performed by: OBSTETRICS & GYNECOLOGY

## 2022-06-28 PROCEDURE — 0502F SUBSEQUENT PRENATAL CARE: CPT | Mod: ,,, | Performed by: OBSTETRICS & GYNECOLOGY

## 2022-06-29 NOTE — PROGRESS NOTES
36 y.o. female  at 23w5d   Reports fetal movement or fluttering. Denies any vaginal bleeding, leakage of fluid, cramping, contractions, or pressure.   She complains of no problems  Pt states she is doing well without any concerns.       Daily fetal kick counts, bleeding, and  labor/labor precautions discussed.  Questions answered to desired level of satisfaction  Verbalized understanding to all information and instructions provided.    Total weight gain/weight loss in pregnancy: -0.454 kg (-1 lb)     A: 23w5d     ICD-10-CM ICD-9-CM    1. 23 weeks gestation of pregnancy  Z3A.23 V22.2 POCT Urinalysis      Glucose, 1Hr Post Prandial   2. Unspecified  screening  Z36.9 V28.9 POCT Urinalysis   3. History of  delivery, currently pregnant  O34.219 654.20

## 2022-07-25 ENCOUNTER — ROUTINE PRENATAL (OUTPATIENT)
Dept: OBSTETRICS AND GYNECOLOGY | Facility: CLINIC | Age: 37
End: 2022-07-25
Payer: MEDICAID

## 2022-07-25 VITALS
BODY MASS INDEX: 37.92 KG/M2 | WEIGHT: 256.81 LBS | DIASTOLIC BLOOD PRESSURE: 73 MMHG | HEART RATE: 65 BPM | SYSTOLIC BLOOD PRESSURE: 112 MMHG

## 2022-07-25 DIAGNOSIS — Z3A.27 27 WEEKS GESTATION OF PREGNANCY: Primary | ICD-10-CM

## 2022-07-25 DIAGNOSIS — O34.219 HISTORY OF CESAREAN DELIVERY, CURRENTLY PREGNANT: ICD-10-CM

## 2022-07-25 DIAGNOSIS — Z36.9 UNSPECIFIED ANTENATAL SCREENING: ICD-10-CM

## 2022-07-25 LAB
BASOPHILS # BLD AUTO: 0.03 K/UL (ref 0–0.2)
BASOPHILS NFR BLD AUTO: 0.3 % (ref 0–1)
BILIRUB SERPL-MCNC: NORMAL MG/DL
BLOOD, POC UA: NORMAL
DIFFERENTIAL METHOD BLD: ABNORMAL
EOSINOPHIL # BLD AUTO: 0.12 K/UL (ref 0–0.5)
EOSINOPHIL NFR BLD AUTO: 1.2 % (ref 1–4)
ERYTHROCYTE [DISTWIDTH] IN BLOOD BY AUTOMATED COUNT: 16.1 % (ref 11.5–14.5)
GLUCOSE SERPL-MCNC: 126 MG/DL
GLUCOSE UR QL STRIP: NORMAL
HCT VFR BLD AUTO: 37.3 % (ref 38–47)
HGB BLD-MCNC: 11.5 G/DL (ref 12–16)
IMM GRANULOCYTES # BLD AUTO: 0.03 K/UL (ref 0–0.04)
IMM GRANULOCYTES NFR BLD: 0.3 % (ref 0–0.4)
KETONES UR QL STRIP: NORMAL
LEUKOCYTE ESTERASE URINE, POC: NORMAL
LYMPHOCYTES # BLD AUTO: 2.24 K/UL (ref 1–4.8)
LYMPHOCYTES NFR BLD AUTO: 22.2 % (ref 27–41)
MCH RBC QN AUTO: 27.2 PG (ref 27–31)
MCHC RBC AUTO-ENTMCNC: 30.8 G/DL (ref 32–36)
MCV RBC AUTO: 88.2 FL (ref 80–96)
MONOCYTES # BLD AUTO: 0.36 K/UL (ref 0–0.8)
MONOCYTES NFR BLD AUTO: 3.6 % (ref 2–6)
MPC BLD CALC-MCNC: 10.7 FL (ref 9.4–12.4)
NEUTROPHILS # BLD AUTO: 7.29 K/UL (ref 1.8–7.7)
NEUTROPHILS NFR BLD AUTO: 72.4 % (ref 53–65)
NITRITE, POC UA: NORMAL
NRBC # BLD AUTO: 0 X10E3/UL
NRBC, AUTO (.00): 0 %
PH, POC UA: 7
PLATELET # BLD AUTO: 339 K/UL (ref 150–400)
PROTEIN, POC: NORMAL
RBC # BLD AUTO: 4.23 M/UL (ref 4.2–5.4)
SPECIFIC GRAVITY, POC UA: 1.01
UROBILINOGEN, POC UA: 0.2
WBC # BLD AUTO: 10.07 K/UL (ref 4.5–11)

## 2022-07-25 PROCEDURE — 36415 COLL VENOUS BLD VENIPUNCTURE: CPT | Mod: 90,,, | Performed by: OBSTETRICS & GYNECOLOGY

## 2022-07-25 PROCEDURE — 85025 CBC WITH DIFFERENTIAL: ICD-10-PCS | Mod: ,,, | Performed by: CLINICAL MEDICAL LABORATORY

## 2022-07-25 PROCEDURE — 0502F PR SUBSEQUENT PRENATAL CARE: ICD-10-PCS | Mod: ,,, | Performed by: OBSTETRICS & GYNECOLOGY

## 2022-07-25 PROCEDURE — 82950 GLUCOSE, 1HR POST PRANDIAL: ICD-10-PCS | Mod: ,,, | Performed by: CLINICAL MEDICAL LABORATORY

## 2022-07-25 PROCEDURE — 36415 PR COLLECTION VENOUS BLOOD,VENIPUNCTURE: ICD-10-PCS | Mod: 90,,, | Performed by: OBSTETRICS & GYNECOLOGY

## 2022-07-25 PROCEDURE — 82950 GLUCOSE TEST: CPT | Mod: ,,, | Performed by: CLINICAL MEDICAL LABORATORY

## 2022-07-25 PROCEDURE — 0502F SUBSEQUENT PRENATAL CARE: CPT | Mod: ,,, | Performed by: OBSTETRICS & GYNECOLOGY

## 2022-07-25 PROCEDURE — 85025 COMPLETE CBC W/AUTO DIFF WBC: CPT | Mod: ,,, | Performed by: CLINICAL MEDICAL LABORATORY

## 2022-07-25 NOTE — PROGRESS NOTES
36 y.o. female  at 27w4d   Reports fetal movement or fluttering. Denies any vaginal bleeding, leakage of fluid, cramping, contractions, or pressure.   She complains of nothing  Pt states she is doing well without any concerns.     The following were addressed during this visit:    1-8 Weeks  - Lifestyle Discussion   - Warning Signs   - Course of Care   - Physiology of Pregnancy   - Nutrition and Supplements   - Domestic Abuse Screen   - HIV Counseling   - Smoking Intervention   - SPAAD/Insurance Verification   - Importance of Exclusive Breastfeeding for First 6 Months   - Continuation of Breastfeeding of Complimentary after intro of solid foods   - Benefits of Breastfeeding     8-12 Weeks  - Review lab tests   - Genetic Counseling (NT/CVS/Amino)   - Influenza IM (for due date  - 3/31)   - Non-pharmacologic Pain Relief Methods for Labor & Birth     13-16 Weeks  - Quad screen   - Anatomy Ultrasound   - Breastfeeding Concerns & Resources   - Importance of Early Skin to Skin Contact     17-20 Weeks  - Quickening   - Lifestyle   - Ultrasound   - Importance of Early and Frequent Breastfeeding   - Baby-led Feeding   - Frequent feeding to help assure optimal milk production     21-24 Weeks  -  Labor Signs   - Travel During Pregnancy   - Gestational diabetes screening protocol   - Effective Position and Latch   - Risks of Formula Use   - Risks of pacifier use     25-28 Weeks  -  Labor Signs   - Childbirth Education   - Maternity Leave paperwork   - Smoking Intervention   - Weight Gain/Diet/Exercise   - Rhogam Given   - Rooming in baby during your hospital stay         Daily fetal kick counts, bleeding, and  labor/labor precautions discussed.  Questions answered to desired level of satisfaction  Verbalized understanding to all information and instructions provided.    Total weight gain/weight loss in pregnancy: 0.816 kg (1 lb 12.8 oz)     A: 27w4d     ICD-10-CM ICD-9-CM    1. 27 weeks gestation  of pregnancy  Z3A.27 V22.2 POCT URINALYSIS      CBC Auto Differential      CBC Auto Differential   2. 23 weeks gestation of pregnancy  Z3A.23 V22.2 Glucose, 1Hr Post Prandial   3. History of  delivery, currently pregnant  O34.219 654.20

## 2022-08-18 ENCOUNTER — ROUTINE PRENATAL (OUTPATIENT)
Dept: OBSTETRICS AND GYNECOLOGY | Facility: CLINIC | Age: 37
End: 2022-08-18
Payer: MEDICAID

## 2022-08-18 VITALS
DIASTOLIC BLOOD PRESSURE: 76 MMHG | HEART RATE: 77 BPM | SYSTOLIC BLOOD PRESSURE: 129 MMHG | WEIGHT: 259.38 LBS | BODY MASS INDEX: 38.31 KG/M2

## 2022-08-18 DIAGNOSIS — Z3A.31 31 WEEKS GESTATION OF PREGNANCY: Primary | ICD-10-CM

## 2022-08-18 DIAGNOSIS — O34.219 HISTORY OF CESAREAN DELIVERY, CURRENTLY PREGNANT: ICD-10-CM

## 2022-08-18 DIAGNOSIS — Z36.9 UNSPECIFIED ANTENATAL SCREENING: ICD-10-CM

## 2022-08-18 LAB
BILIRUB SERPL-MCNC: NORMAL MG/DL
BLOOD, POC UA: NORMAL
GLUCOSE UR QL STRIP: NORMAL
KETONES UR QL STRIP: NORMAL
LEUKOCYTE ESTERASE URINE, POC: NORMAL
NITRITE, POC UA: NORMAL
PH, POC UA: 6
PROTEIN, POC: NORMAL
SPECIFIC GRAVITY, POC UA: 1.02
UROBILINOGEN, POC UA: 1

## 2022-08-18 PROCEDURE — 0502F PR SUBSEQUENT PRENATAL CARE: ICD-10-PCS | Mod: ,,, | Performed by: OBSTETRICS & GYNECOLOGY

## 2022-08-18 PROCEDURE — 0502F SUBSEQUENT PRENATAL CARE: CPT | Mod: ,,, | Performed by: OBSTETRICS & GYNECOLOGY

## 2022-08-18 NOTE — PROGRESS NOTES
36 y.o. female  at 31w0d   Reports fetal movement or fluttering. Denies any vaginal bleeding, leakage of fluid, cramping, contractions, or pressure.   She complains of decreased movement for a couple of days but baby started moving normally. Educated pt on fetal kick counts/  Pt states she is doing well without any concerns.     The following were addressed during this visit:    29-32 Weeks  - Tdap Given   - Contraception/Tubal Consent   - Pre-registration   - Circumsision plans   - Op note review/ consent   - Birth Plan   - Pediatrician   - Fetal Kick Counts/PIH/PTL precautions   - Preeclampsia Education   - Quiet time         Daily fetal kick counts, bleeding, and  labor/labor precautions discussed.  Questions answered to desired level of satisfaction  Verbalized understanding to all information and instructions provided.    Total weight gain/weight loss in pregnancy: 1.996 kg (4 lb 6.4 oz)     A: 31w0d     ICD-10-CM ICD-9-CM    1. 31 weeks gestation of pregnancy  Z3A.31 V22.2 POCT Urinalysis   2. Unspecified  screening  Z36.9 V28.9 POCT Urinalysis      US OB 14+ Weeks TransAbd, w/Biophysical Profile, w/o NST, Single Gestation (xpd)   3. History of  delivery, currently pregnant  O34.219 654.20

## 2022-09-15 ENCOUNTER — ROUTINE PRENATAL (OUTPATIENT)
Dept: OBSTETRICS AND GYNECOLOGY | Facility: CLINIC | Age: 37
End: 2022-09-15
Payer: MEDICAID

## 2022-09-15 VITALS
SYSTOLIC BLOOD PRESSURE: 121 MMHG | HEART RATE: 65 BPM | WEIGHT: 265.63 LBS | BODY MASS INDEX: 39.22 KG/M2 | DIASTOLIC BLOOD PRESSURE: 77 MMHG

## 2022-09-15 DIAGNOSIS — O34.219 HISTORY OF CESAREAN DELIVERY, CURRENTLY PREGNANT: ICD-10-CM

## 2022-09-15 DIAGNOSIS — Z11.3 SCREEN FOR STD (SEXUALLY TRANSMITTED DISEASE): ICD-10-CM

## 2022-09-15 DIAGNOSIS — Z3A.35 35 WEEKS GESTATION OF PREGNANCY: Primary | ICD-10-CM

## 2022-09-15 DIAGNOSIS — Z36.9 ANTENATAL SCREENING ENCOUNTER: ICD-10-CM

## 2022-09-15 DIAGNOSIS — Z72.51 HIGH RISK SEXUAL BEHAVIOR, UNSPECIFIED TYPE: ICD-10-CM

## 2022-09-15 DIAGNOSIS — Z36.89 ENCOUNTER FOR OTHER SPECIFIED ANTENATAL SCREENING: ICD-10-CM

## 2022-09-15 DIAGNOSIS — Z36.85 ENCOUNTER FOR ANTENATAL SCREENING FOR STREPTOCOCCUS B: ICD-10-CM

## 2022-09-15 LAB
BILIRUB SERPL-MCNC: NORMAL MG/DL
BLOOD, POC UA: NORMAL
CANDIDA SPECIES: NEGATIVE
CTP QC/QA: YES
GARDNERELLA: NEGATIVE
GLUCOSE UR QL STRIP: NORMAL
KETONES UR QL STRIP: NORMAL
LEUKOCYTE ESTERASE URINE, POC: NORMAL
NITRITE, POC UA: NORMAL
PH, POC UA: 6.5
POC (AMP) AMPHETAMINE: NEGATIVE
POC (BAR) BARBITURATES: NEGATIVE
POC (BUP) BUPRENORPHINE: NEGATIVE
POC (BZO) BENZODIAZEPINES: NEGATIVE
POC (COC) COCAINE: NEGATIVE
POC (MDMA) METHYLENEDIOXYMETHAMPHETAMINE 3,4: NEGATIVE
POC (MET) METHAMPHETAMINE: NEGATIVE
POC (MOP) OPIATES: NEGATIVE
POC (MTD) METHADONE: NEGATIVE
POC (OXY) OXYCODONE: NEGATIVE
POC (PCP) PHENCYCLIDINE: NEGATIVE
POC (TCA) TRICYCLIC ANTIDEPRESSANTS: NEGATIVE
POC TEMPERATURE (URINE): 90
POC THC: NEGATIVE
PROTEIN, POC: NORMAL
SPECIFIC GRAVITY, POC UA: 1.01
TRICHOMONAS: NEGATIVE
UROBILINOGEN, POC UA: 0.2

## 2022-09-15 PROCEDURE — 87801 PR  DETECT AGENT, MULT ORGS, DNA, AMP: ICD-10-PCS | Mod: ,,, | Performed by: CLINICAL MEDICAL LABORATORY

## 2022-09-15 PROCEDURE — 87510 BACTERIAL VAGINOSIS: ICD-10-PCS | Mod: ,,, | Performed by: CLINICAL MEDICAL LABORATORY

## 2022-09-15 PROCEDURE — 0502F PR SUBSEQUENT PRENATAL CARE: ICD-10-PCS | Mod: ,,, | Performed by: OBSTETRICS & GYNECOLOGY

## 2022-09-15 PROCEDURE — 87510 GARDNER VAG DNA DIR PROBE: CPT | Mod: ,,, | Performed by: CLINICAL MEDICAL LABORATORY

## 2022-09-15 PROCEDURE — 87653 STREP B DNA AMP PROBE: CPT | Mod: 59,,, | Performed by: CLINICAL MEDICAL LABORATORY

## 2022-09-15 PROCEDURE — 87660 TRICHOMONAS VAGIN DIR PROBE: CPT | Mod: ,,, | Performed by: CLINICAL MEDICAL LABORATORY

## 2022-09-15 PROCEDURE — 80305 DRUG TEST PRSMV DIR OPT OBS: CPT | Mod: QW,,, | Performed by: OBSTETRICS & GYNECOLOGY

## 2022-09-15 PROCEDURE — 0502F SUBSEQUENT PRENATAL CARE: CPT | Mod: ,,, | Performed by: OBSTETRICS & GYNECOLOGY

## 2022-09-15 PROCEDURE — 87480 CANDIDA DNA DIR PROBE: CPT | Mod: ,,, | Performed by: CLINICAL MEDICAL LABORATORY

## 2022-09-15 PROCEDURE — 80305 POCT URINE DRUG SCREEN PRESUMP: ICD-10-PCS | Mod: QW,,, | Performed by: OBSTETRICS & GYNECOLOGY

## 2022-09-15 PROCEDURE — 87660 BACTERIAL VAGINOSIS: ICD-10-PCS | Mod: ,,, | Performed by: CLINICAL MEDICAL LABORATORY

## 2022-09-15 PROCEDURE — 87480 BACTERIAL VAGINOSIS: ICD-10-PCS | Mod: ,,, | Performed by: CLINICAL MEDICAL LABORATORY

## 2022-09-15 PROCEDURE — 87653 STREP B SCREEN, ANTEPARTUM: ICD-10-PCS | Mod: 59,,, | Performed by: CLINICAL MEDICAL LABORATORY

## 2022-09-15 PROCEDURE — 87801 DETECT AGNT MULT DNA AMPLI: CPT | Mod: ,,, | Performed by: CLINICAL MEDICAL LABORATORY

## 2022-09-15 RX ORDER — OXYTOCIN/RINGER'S LACTATE 30/500 ML
95 PLASTIC BAG, INJECTION (ML) INTRAVENOUS ONCE
Status: CANCELLED | OUTPATIENT
Start: 2022-09-15 | End: 2022-09-15

## 2022-09-15 RX ORDER — OXYTOCIN/RINGER'S LACTATE 30/500 ML
334 PLASTIC BAG, INJECTION (ML) INTRAVENOUS ONCE
Status: CANCELLED | OUTPATIENT
Start: 2022-09-15 | End: 2022-09-15

## 2022-09-15 RX ORDER — SODIUM CITRATE AND CITRIC ACID MONOHYDRATE 334; 500 MG/5ML; MG/5ML
30 SOLUTION ORAL
Status: CANCELLED | OUTPATIENT
Start: 2022-09-15

## 2022-09-15 RX ORDER — CARBOPROST TROMETHAMINE 250 UG/ML
250 INJECTION, SOLUTION INTRAMUSCULAR
Status: CANCELLED | OUTPATIENT
Start: 2022-09-15

## 2022-09-15 RX ORDER — MISOPROSTOL 100 UG/1
800 TABLET ORAL
Status: CANCELLED | OUTPATIENT
Start: 2022-09-15

## 2022-09-15 RX ORDER — SODIUM CHLORIDE, SODIUM LACTATE, POTASSIUM CHLORIDE, CALCIUM CHLORIDE 600; 310; 30; 20 MG/100ML; MG/100ML; MG/100ML; MG/100ML
INJECTION, SOLUTION INTRAVENOUS CONTINUOUS
Status: CANCELLED | OUTPATIENT
Start: 2022-09-15

## 2022-09-15 RX ORDER — MUPIROCIN 20 MG/G
OINTMENT TOPICAL
Status: CANCELLED | OUTPATIENT
Start: 2022-09-15

## 2022-09-15 RX ORDER — METHYLERGONOVINE MALEATE 0.2 MG/ML
200 INJECTION INTRAVENOUS
Status: CANCELLED | OUTPATIENT
Start: 2022-09-15

## 2022-09-15 RX ORDER — FAMOTIDINE 10 MG/ML
20 INJECTION INTRAVENOUS
Status: CANCELLED | OUTPATIENT
Start: 2022-09-15

## 2022-09-15 NOTE — PROGRESS NOTES
36 y.o. female  at 35w0d   Reports fetal movement or fluttering. Denies any vaginal bleeding, leakage of fluid, cramping, contractions, or pressure.   She complains of nothing  Pt states she is doing well without any concerns.     Pt is scheduled for repeat  at [facility] on 10/13/2022.    The following were addressed during this visit:    33-36 Weeks  - Childbirth Education/Hospital Tours   - Breastfeeding   - Group B Strep Test/HIV/RPR   - Fetal Kick Counts/PIH/PTL precautions         Daily fetal kick counts, bleeding, and  labor/labor precautions discussed.  Questions answered to desired level of satisfaction  Verbalized understanding to all information and instructions provided.    Total weight gain/weight loss in pregnancy: 4.808 kg (10 lb 9.6 oz)     A: 35w0d     ICD-10-CM ICD-9-CM    1. 35 weeks gestation of pregnancy  Z3A.35 V22.2 CBC Auto Differential      Treponema Pallidum (Syphillis) Antibody      Chlamydia/GC, PCR      Bacterial Vaginosis      Strep B Screen, Antepartum      POCT Urinalysis      POCT Urine Drug Screen Presump      2.  screening encounter  Z36.9 V28.9 CBC Auto Differential      Treponema Pallidum (Syphillis) Antibody      Chlamydia/GC, PCR      Bacterial Vaginosis      Strep B Screen, Antepartum      POCT Urinalysis      POCT Urine Drug Screen Presump      3. Screen for STD (sexually transmitted disease)  Z11.3 V74.5 Treponema Pallidum (Syphillis) Antibody      Chlamydia/GC, PCR      Bacterial Vaginosis      4. High risk sexual behavior, unspecified type  Z72.51 V69.2 Treponema Pallidum (Syphillis) Antibody      Chlamydia/GC, PCR      Bacterial Vaginosis      5. Encounter for other specified  screening  Z36.89 V28.9 POCT Urine Drug Screen Presump      6. Encounter for  screening for Streptococcus B  Z36.85 V28.6 Strep B Screen, Antepartum      7. History of  delivery, currently pregnant  O34.219 654.20 Case Request Operating Room:   SECTION      Admit to Inpatient      Full code      Vital signs      Verify informed consent      Fetal monitoring WITH contraction monitoring      Jesus to Lowber      Insert peripheral IV      Chlorhexidine (CHG) 2% Wipes      Clip and Prep Suprapubic      Notify NICU to attend birth      Notify Physician      Diet NPO      Place ASHLEY hose      Place sequential compression device      Chlorohexidine Gluconate Bath      CBC auto differential      HIV 1/2 Ag/Ab (4th Gen)      POCT Cord Blood Gas Umbilical Artery Cord Gas      POCT Cord Blood Gas Umbilical Vein Cord Gas      Type & Screen      COVID-19 Rapid Screening      Inpatient consult to Anesthesiology

## 2022-09-16 LAB
CHLAMYDIA BY PCR: NEGATIVE
GROUP B STREP, PCR: NEGATIVE
N. GONORRHOEAE (GC) BY PCR: NEGATIVE

## 2022-09-22 ENCOUNTER — ROUTINE PRENATAL (OUTPATIENT)
Dept: OBSTETRICS AND GYNECOLOGY | Facility: CLINIC | Age: 37
End: 2022-09-22
Payer: MEDICAID

## 2022-09-22 VITALS
SYSTOLIC BLOOD PRESSURE: 123 MMHG | DIASTOLIC BLOOD PRESSURE: 82 MMHG | WEIGHT: 264 LBS | BODY MASS INDEX: 38.99 KG/M2 | HEART RATE: 77 BPM

## 2022-09-22 DIAGNOSIS — Z36.9 ANTENATAL SCREENING ENCOUNTER: ICD-10-CM

## 2022-09-22 DIAGNOSIS — Z3A.36 36 WEEKS GESTATION OF PREGNANCY: Primary | ICD-10-CM

## 2022-09-22 DIAGNOSIS — O34.219 HISTORY OF CESAREAN DELIVERY, CURRENTLY PREGNANT: ICD-10-CM

## 2022-09-22 LAB
BILIRUB SERPL-MCNC: NORMAL MG/DL
BLOOD, POC UA: NORMAL
GLUCOSE UR QL STRIP: NORMAL
KETONES UR QL STRIP: NORMAL
LEUKOCYTE ESTERASE URINE, POC: NORMAL
NITRITE, POC UA: NORMAL
PH, POC UA: 5.5
PROTEIN, POC: NORMAL
SPECIFIC GRAVITY, POC UA: 1.02
UROBILINOGEN, POC UA: 0.2

## 2022-09-22 PROCEDURE — 0502F SUBSEQUENT PRENATAL CARE: CPT | Mod: ,,, | Performed by: OBSTETRICS & GYNECOLOGY

## 2022-09-22 PROCEDURE — 0502F PR SUBSEQUENT PRENATAL CARE: ICD-10-PCS | Mod: ,,, | Performed by: OBSTETRICS & GYNECOLOGY

## 2022-09-22 NOTE — PROGRESS NOTES
36 y.o. female  at 36w0d   Reports fetal movement or fluttering. Denies any vaginal bleeding, leakage of fluid, cramping, contractions, or pressure.   She complains of nothing/  Pt states she is doing well without any concerns.     Vitals  BP: 123/82  Pulse: 77  Weight: 119.7 kg (264 lb)  Prenatal  Fundal Height (cm): 36 cm  Fetal Heart Rate: 140s  Movement: Present  Urine Albumin/Glucose  Urine Albumin: Negative  Urine Glucose: Negative  Edema  LLE Edema: None  RLE Edema: None  Facial: None  Additional Edema?: No  Cervical Exam  Dilation: 1  Effacement (%): 60  Station: -3  Presentation: Vertex  Station (Labor Curve): 8 cm  Dilation/Effacement/Station  Dilation: 1  Effacement (%): 60  Station: -3    Prenatal Labs:  Lab Results   Component Value Date    GROUPTRH O POS 2022    HGB 11.5 (L) 2022    HCT 37.3 (L) 2022     2022    SICKLE Negative 2022    HEPBSAG Non-Reactive 2022    [id number] Non-Reactive 2022    LABNGO Negative 09/15/2022    LABURIN No Growth 2022       No pregnancy checklist tasks were completed during this visit, and no tasks are pending completion.      Daily fetal kick counts, bleeding, and  labor/labor precautions discussed.  Questions answered to desired level of satisfaction  Verbalized understanding to all information and instructions provided.    Total weight gain/weight loss in pregnancy: 4.082 kg (9 lb)     Follow up in about 1 week (around 2022) for ANGEL.    A: 36w0d     ICD-10-CM ICD-9-CM    1. 36 weeks gestation of pregnancy  Z3A.36 V22.2 POCT Urinalysis      2.  screening encounter  Z36.9 V28.9 POCT Urinalysis      3. History of  delivery, currently pregnant  O34.219 654.20             Mario Foster M.D., FCOG    OB/GYN

## 2022-09-29 ENCOUNTER — ROUTINE PRENATAL (OUTPATIENT)
Dept: OBSTETRICS AND GYNECOLOGY | Facility: CLINIC | Age: 37
End: 2022-09-29
Payer: MEDICAID

## 2022-09-29 VITALS
BODY MASS INDEX: 39.81 KG/M2 | DIASTOLIC BLOOD PRESSURE: 82 MMHG | SYSTOLIC BLOOD PRESSURE: 128 MMHG | WEIGHT: 269.63 LBS | HEART RATE: 104 BPM

## 2022-09-29 DIAGNOSIS — O34.219 HISTORY OF CESAREAN DELIVERY, CURRENTLY PREGNANT: ICD-10-CM

## 2022-09-29 DIAGNOSIS — Z36.9 ANTENATAL SCREENING ENCOUNTER: ICD-10-CM

## 2022-09-29 DIAGNOSIS — Z72.51 HIGH RISK SEXUAL BEHAVIOR, UNSPECIFIED TYPE: ICD-10-CM

## 2022-09-29 DIAGNOSIS — Z3A.37 37 WEEKS GESTATION OF PREGNANCY: Primary | ICD-10-CM

## 2022-09-29 DIAGNOSIS — Z11.3 SCREEN FOR STD (SEXUALLY TRANSMITTED DISEASE): ICD-10-CM

## 2022-09-29 LAB
BASOPHILS # BLD AUTO: 0.02 K/UL (ref 0–0.2)
BASOPHILS NFR BLD AUTO: 0.2 % (ref 0–1)
BILIRUB SERPL-MCNC: NORMAL MG/DL
BLOOD, POC UA: NORMAL
DIFFERENTIAL METHOD BLD: ABNORMAL
EOSINOPHIL # BLD AUTO: 0.13 K/UL (ref 0–0.5)
EOSINOPHIL NFR BLD AUTO: 1.2 % (ref 1–4)
ERYTHROCYTE [DISTWIDTH] IN BLOOD BY AUTOMATED COUNT: 15.9 % (ref 11.5–14.5)
GLUCOSE UR QL STRIP: NORMAL
HCT VFR BLD AUTO: 37.1 % (ref 38–47)
HGB BLD-MCNC: 11.5 G/DL (ref 12–16)
IMM GRANULOCYTES # BLD AUTO: 0.1 K/UL (ref 0–0.04)
IMM GRANULOCYTES NFR BLD: 0.9 % (ref 0–0.4)
KETONES UR QL STRIP: NORMAL
LEUKOCYTE ESTERASE URINE, POC: NORMAL
LYMPHOCYTES # BLD AUTO: 0.98 K/UL (ref 1–4.8)
LYMPHOCYTES NFR BLD AUTO: 9.1 % (ref 27–41)
MCH RBC QN AUTO: 26.7 PG (ref 27–31)
MCHC RBC AUTO-ENTMCNC: 31 G/DL (ref 32–36)
MCV RBC AUTO: 86.1 FL (ref 80–96)
MONOCYTES # BLD AUTO: 0.58 K/UL (ref 0–0.8)
MONOCYTES NFR BLD AUTO: 5.4 % (ref 2–6)
MPC BLD CALC-MCNC: 10.5 FL (ref 9.4–12.4)
NEUTROPHILS # BLD AUTO: 8.94 K/UL (ref 1.8–7.7)
NEUTROPHILS NFR BLD AUTO: 83.2 % (ref 53–65)
NITRITE, POC UA: NORMAL
NRBC # BLD AUTO: 0 X10E3/UL
NRBC, AUTO (.00): 0 %
PH, POC UA: 6.5
PLATELET # BLD AUTO: 306 K/UL (ref 150–400)
PROTEIN, POC: NORMAL
RBC # BLD AUTO: 4.31 M/UL (ref 4.2–5.4)
SPECIFIC GRAVITY, POC UA: 1.02
SYPHILIS AB INTERPRETATION: NORMAL
UROBILINOGEN, POC UA: 0.2
WBC # BLD AUTO: 10.75 K/UL (ref 4.5–11)

## 2022-09-29 PROCEDURE — 85025 CBC WITH DIFFERENTIAL: ICD-10-PCS | Mod: ,,, | Performed by: CLINICAL MEDICAL LABORATORY

## 2022-09-29 PROCEDURE — 85025 COMPLETE CBC W/AUTO DIFF WBC: CPT | Mod: ,,, | Performed by: CLINICAL MEDICAL LABORATORY

## 2022-09-29 PROCEDURE — 36415 COLL VENOUS BLD VENIPUNCTURE: CPT | Mod: 90,,, | Performed by: OBSTETRICS & GYNECOLOGY

## 2022-09-29 PROCEDURE — 36415 PR COLLECTION VENOUS BLOOD,VENIPUNCTURE: ICD-10-PCS | Mod: 90,,, | Performed by: OBSTETRICS & GYNECOLOGY

## 2022-09-29 PROCEDURE — 86780 TREPONEMA PALLIDUM (SYPHILIS) ANTIBODY: ICD-10-PCS | Mod: ,,, | Performed by: CLINICAL MEDICAL LABORATORY

## 2022-09-29 PROCEDURE — 86780 TREPONEMA PALLIDUM: CPT | Mod: ,,, | Performed by: CLINICAL MEDICAL LABORATORY

## 2022-09-29 PROCEDURE — 0502F SUBSEQUENT PRENATAL CARE: CPT | Mod: ,,, | Performed by: OBSTETRICS & GYNECOLOGY

## 2022-09-29 PROCEDURE — 0502F PR SUBSEQUENT PRENATAL CARE: ICD-10-PCS | Mod: ,,, | Performed by: OBSTETRICS & GYNECOLOGY

## 2022-09-29 NOTE — PROGRESS NOTES
36 y.o. female  at 37w0d   Reports fetal movement or fluttering. Denies any vaginal bleeding, leakage of fluid, cramping, contractions, or pressure.   She complains of nothing.  Pt states she is doing well without any concerns.     Vitals  BP: 128/82  Pulse: 104  Weight: 122.3 kg (269 lb 9.6 oz)  Prenatal  Fundal Height (cm): 37 cm  Fetal Heart Rate: 140s  Movement: Present  Urine Albumin/Glucose  Urine Albumin: Negative  Urine Glucose: Negative  Edema  LLE Edema: None  RLE Edema: None  Facial: None  Additional Edema?: No  Cervical Exam  Dilation: 1  Effacement (%): 60  Station: -3  Presentation: Vertex  Station (Labor Curve): 8 cm  Dilation/Effacement/Station  Dilation: 1  Effacement (%): 60  Station: -3    Prenatal Labs:  Lab Results   Component Value Date    GROUPTRH O POS 2022    HGB 11.5 (L) 2022    HCT 37.3 (L) 2022     2022    SICKLE Negative 2022    HEPBSAG Non-Reactive 2022    [id number] Non-Reactive 2022    LABNGO Negative 09/15/2022    LABURIN No Growth 2022       The following were addressed during this visit:    37-41 Weeks  - Postpartum Immunizations   - Hospital Stay Expectations   - When to go to the hospital   - Fetal kick counts/PIH/Bleeding/ROM/Labor precautions   - Labor induction policy   - Cervical ripening   - Breastfeeding review: benefits of breastfeeding, early skin to skin, 24/7 rooming in, exclusive breastfeeding for 6 months       Daily fetal kick counts, bleeding, and  labor/labor precautions discussed.  Questions answered to desired level of satisfaction  Verbalized understanding to all information and instructions provided.    Total weight gain/weight loss in pregnancy: 6.623 kg (14 lb 9.6 oz)     Follow up in about 1 week (around 10/6/2022) for ANGEL.    A: 37w0d     ICD-10-CM ICD-9-CM    1. 37 weeks gestation of pregnancy  Z3A.37 V22.2 POCT Urinalysis      CANCELED: POCT Urinalysis      2.  screening  encounter  Z36.9 V28.9 CBC Auto Differential      Treponema Pallidum (Syphillis) Antibody      POCT Urinalysis      CANCELED: POCT Urinalysis      3. Screen for STD (sexually transmitted disease)  Z11.3 V74.5 Treponema Pallidum (Syphillis) Antibody      4. High risk sexual behavior, unspecified type  Z72.51 V69.2 Treponema Pallidum (Syphillis) Antibody      5. History of  delivery, currently pregnant  O34.219 654.20             Mario Foster M.D., FCOG    OB/GYN

## 2022-10-06 ENCOUNTER — ANESTHESIA EVENT (OUTPATIENT)
Dept: OBSTETRICS AND GYNECOLOGY | Facility: HOSPITAL | Age: 37
End: 2022-10-06
Payer: MEDICAID

## 2022-10-06 ENCOUNTER — ROUTINE PRENATAL (OUTPATIENT)
Dept: OBSTETRICS AND GYNECOLOGY | Facility: CLINIC | Age: 37
End: 2022-10-06
Payer: MEDICAID

## 2022-10-06 ENCOUNTER — ANESTHESIA (OUTPATIENT)
Dept: OBSTETRICS AND GYNECOLOGY | Facility: HOSPITAL | Age: 37
End: 2022-10-06
Payer: MEDICAID

## 2022-10-06 ENCOUNTER — HOSPITAL ENCOUNTER (OUTPATIENT)
Facility: HOSPITAL | Age: 37
LOS: 1 days | Discharge: HOME | End: 2022-10-06
Attending: OBSTETRICS & GYNECOLOGY | Admitting: OBSTETRICS & GYNECOLOGY
Payer: MEDICAID

## 2022-10-06 VITALS
WEIGHT: 267.38 LBS | HEART RATE: 73 BPM | DIASTOLIC BLOOD PRESSURE: 96 MMHG | SYSTOLIC BLOOD PRESSURE: 136 MMHG | BODY MASS INDEX: 39.49 KG/M2

## 2022-10-06 VITALS
DIASTOLIC BLOOD PRESSURE: 63 MMHG | HEART RATE: 76 BPM | OXYGEN SATURATION: 99 % | TEMPERATURE: 97 F | RESPIRATION RATE: 18 BRPM | SYSTOLIC BLOOD PRESSURE: 90 MMHG

## 2022-10-06 DIAGNOSIS — O34.219 HISTORY OF CESAREAN DELIVERY, CURRENTLY PREGNANT: ICD-10-CM

## 2022-10-06 DIAGNOSIS — Z3A.38 38 WEEKS GESTATION OF PREGNANCY: Primary | ICD-10-CM

## 2022-10-06 DIAGNOSIS — Z36.9 UNSPECIFIED ANTENATAL SCREENING: ICD-10-CM

## 2022-10-06 DIAGNOSIS — Z34.90 PREGNANT AND NOT YET DELIVERED: ICD-10-CM

## 2022-10-06 LAB
ALBUMIN SERPL BCP-MCNC: 2.5 G/DL (ref 3.5–5)
ALBUMIN/GLOB SERPL: 0.7 {RATIO}
ALP SERPL-CCNC: 150 U/L (ref 37–98)
ALT SERPL W P-5'-P-CCNC: 25 U/L (ref 13–56)
ANION GAP SERPL CALCULATED.3IONS-SCNC: 14 MMOL/L (ref 7–16)
AST SERPL W P-5'-P-CCNC: 22 U/L (ref 15–37)
BASOPHILS # BLD AUTO: 0.02 K/UL (ref 0–0.2)
BASOPHILS NFR BLD AUTO: 0.2 % (ref 0–1)
BILIRUB SERPL-MCNC: 0.3 MG/DL (ref ?–1.2)
BILIRUB SERPL-MCNC: NORMAL MG/DL
BILIRUB UR QL STRIP: NEGATIVE
BLOOD, POC UA: NORMAL
BUN SERPL-MCNC: 7 MG/DL (ref 7–18)
BUN/CREAT SERPL: 10 (ref 6–20)
CALCIUM SERPL-MCNC: 8.7 MG/DL (ref 8.5–10.1)
CHLORIDE SERPL-SCNC: 100 MMOL/L (ref 98–107)
CLARITY UR: CLEAR
CO2 SERPL-SCNC: 23 MMOL/L (ref 21–32)
COLOR UR: NORMAL
CREAT SERPL-MCNC: 0.68 MG/DL (ref 0.55–1.02)
CREAT UR-MCNC: 53 MG/DL (ref 28–219)
DIFFERENTIAL METHOD BLD: ABNORMAL
EGFR (NO RACE VARIABLE) (RUSH/TITUS): 117 ML/MIN/1.73M²
EOSINOPHIL # BLD AUTO: 0.11 K/UL (ref 0–0.5)
EOSINOPHIL NFR BLD AUTO: 0.9 % (ref 1–4)
ERYTHROCYTE [DISTWIDTH] IN BLOOD BY AUTOMATED COUNT: 15.2 % (ref 11.5–14.5)
GLOBULIN SER-MCNC: 3.7 G/DL (ref 2–4)
GLUCOSE SERPL-MCNC: 80 MG/DL (ref 74–106)
GLUCOSE UR QL STRIP: NORMAL
GLUCOSE UR STRIP-MCNC: NORMAL MG/DL
HCT VFR BLD AUTO: 37.2 % (ref 38–47)
HGB BLD-MCNC: 11.6 G/DL (ref 12–16)
IMM GRANULOCYTES # BLD AUTO: 0.09 K/UL (ref 0–0.04)
IMM GRANULOCYTES NFR BLD: 0.8 % (ref 0–0.4)
KETONES UR QL STRIP: NORMAL
KETONES UR STRIP-SCNC: NEGATIVE MG/DL
LEUKOCYTE ESTERASE UR QL STRIP: NEGATIVE
LEUKOCYTE ESTERASE URINE, POC: NORMAL
LYMPHOCYTES # BLD AUTO: 2.64 K/UL (ref 1–4.8)
LYMPHOCYTES NFR BLD AUTO: 22.4 % (ref 27–41)
MCH RBC QN AUTO: 26.7 PG (ref 27–31)
MCHC RBC AUTO-ENTMCNC: 31.2 G/DL (ref 32–36)
MCV RBC AUTO: 85.7 FL (ref 80–96)
MONOCYTES # BLD AUTO: 0.47 K/UL (ref 0–0.8)
MONOCYTES NFR BLD AUTO: 4 % (ref 2–6)
MPC BLD CALC-MCNC: 10.2 FL (ref 9.4–12.4)
NEUTROPHILS # BLD AUTO: 8.46 K/UL (ref 1.8–7.7)
NEUTROPHILS NFR BLD AUTO: 71.7 % (ref 53–65)
NITRITE UR QL STRIP: NEGATIVE
NITRITE, POC UA: NORMAL
NRBC # BLD AUTO: 0 X10E3/UL
NRBC, AUTO (.00): 0 %
PH UR STRIP: 6 PH UNITS
PH, POC UA: 6
PLATELET # BLD AUTO: 330 K/UL (ref 150–400)
POTASSIUM SERPL-SCNC: 3.8 MMOL/L (ref 3.5–5.1)
PROT SERPL-MCNC: 6.2 G/DL (ref 6.4–8.2)
PROT UR QL STRIP: NEGATIVE
PROT UR-MCNC: 6.9 MG/DL (ref 0–11.9)
PROT/CREAT 24H UR-RTO: 0.13
PROTEIN, POC: NORMAL
RBC # BLD AUTO: 4.34 M/UL (ref 4.2–5.4)
RBC # UR STRIP: NEGATIVE /UL
SODIUM SERPL-SCNC: 133 MMOL/L (ref 136–145)
SP GR UR STRIP: 1.01
SPECIFIC GRAVITY, POC UA: 1.01
URATE SERPL-MCNC: 4.7 MG/DL (ref 2.6–6)
UROBILINOGEN UR STRIP-ACNC: NORMAL MG/DL
UROBILINOGEN, POC UA: 0.2
WBC # BLD AUTO: 11.79 K/UL (ref 4.5–11)

## 2022-10-06 PROCEDURE — 80053 COMPREHEN METABOLIC PANEL: CPT | Performed by: OBSTETRICS & GYNECOLOGY

## 2022-10-06 PROCEDURE — 0502F SUBSEQUENT PRENATAL CARE: CPT | Mod: ,,, | Performed by: OBSTETRICS & GYNECOLOGY

## 2022-10-06 PROCEDURE — 84550 ASSAY OF BLOOD/URIC ACID: CPT | Performed by: OBSTETRICS & GYNECOLOGY

## 2022-10-06 PROCEDURE — 85025 COMPLETE CBC W/AUTO DIFF WBC: CPT | Performed by: OBSTETRICS & GYNECOLOGY

## 2022-10-06 PROCEDURE — 0502F PR SUBSEQUENT PRENATAL CARE: ICD-10-PCS | Mod: ,,, | Performed by: OBSTETRICS & GYNECOLOGY

## 2022-10-06 PROCEDURE — 36415 COLL VENOUS BLD VENIPUNCTURE: CPT | Performed by: OBSTETRICS & GYNECOLOGY

## 2022-10-06 PROCEDURE — 81003 URINALYSIS AUTO W/O SCOPE: CPT | Performed by: OBSTETRICS & GYNECOLOGY

## 2022-10-06 PROCEDURE — 82570 ASSAY OF URINE CREATININE: CPT | Performed by: OBSTETRICS & GYNECOLOGY

## 2022-10-06 PROCEDURE — 99211 OFF/OP EST MAY X REQ PHY/QHP: CPT

## 2022-10-06 RX ORDER — KETOROLAC TROMETHAMINE 30 MG/ML
30 INJECTION, SOLUTION INTRAMUSCULAR; INTRAVENOUS EVERY 6 HOURS
Status: CANCELLED | OUTPATIENT
Start: 2022-10-06 | End: 2022-10-07

## 2022-10-06 RX ORDER — ACETAMINOPHEN 325 MG/1
650 TABLET ORAL EVERY 6 HOURS
Status: CANCELLED | OUTPATIENT
Start: 2022-10-06 | End: 2022-10-07

## 2022-10-06 RX ORDER — ONDANSETRON 2 MG/ML
4 INJECTION INTRAMUSCULAR; INTRAVENOUS EVERY 6 HOURS PRN
Status: CANCELLED | OUTPATIENT
Start: 2022-10-06 | End: 2022-10-07

## 2022-10-06 RX ORDER — OXYCODONE HYDROCHLORIDE 5 MG/1
10 TABLET ORAL EVERY 4 HOURS PRN
Status: CANCELLED | OUTPATIENT
Start: 2022-10-06 | End: 2022-10-07

## 2022-10-06 RX ORDER — OXYCODONE HYDROCHLORIDE 5 MG/1
5 TABLET ORAL EVERY 4 HOURS PRN
Status: CANCELLED | OUTPATIENT
Start: 2022-10-06 | End: 2022-10-07

## 2022-10-06 NOTE — PROGRESS NOTES
36 y.o. female  at 38w0d   Reports fetal movement or fluttering. Denies any vaginal bleeding, leakage of fluid, cramping, contractions, or pressure.   She complains of contractions that are irregular in pattern.  Pt states she is doing well without any concerns.     Vitals  BP: (!) 136/96  Pulse: 73  Weight: 121.3 kg (267 lb 6.4 oz)  Prenatal  Fundal Height (cm): 38 cm  Fetal Heart Rate: 140s  Movement: Present  Urine Albumin/Glucose  Urine Albumin: Negative  Urine Glucose: Negative  Edema  LLE Edema: None  RLE Edema: None  Facial: None  Additional Edema?: No  Cervical Exam  Dilation: 1  Effacement (%): 60  Station: Ballotable  Presentation: Vertex  Station (Labor Curve): 9 cm  Dilation/Effacement/Station  Dilation: 1  Effacement (%): 60  Station: Ballotable    Prenatal Labs:  Lab Results   Component Value Date    GROUPTRH O POS 2022    HGB 11.5 (L) 2022    HCT 37.1 (L) 2022     2022    SICKLE Negative 2022    HEPBSAG Non-Reactive 2022    [id number] Non-Reactive 2022    LABNGO Negative 09/15/2022    LABURIN No Growth 2022       The following were addressed during this visit:  - Importance of Breastfeeding     Pt is to go to [facility] todaydue to elevated BP.    Daily fetal kick counts, bleeding, and  labor/labor precautions discussed.  Questions answered to desired level of satisfaction  Verbalized understanding to all information and instructions provided.    Total weight gain/weight loss in pregnancy: 5.625 kg (12 lb 6.4 oz)     No follow-ups on file.    A: 38w0d     ICD-10-CM ICD-9-CM    1. 38 weeks gestation of pregnancy  Z3A.38 V22.2 POCT Urinalysis      2. Unspecified  screening  Z36.9 V28.9 POCT Urinalysis      3. History of  delivery, currently pregnant  O34.219 654.20             Mario Foster M.D., FCOG    OB/GYN

## 2022-10-06 NOTE — ANESTHESIA PREPROCEDURE EVALUATION
10/06/2022  Sidra Winter is a 36 y.o., female.      Pre-op Assessment    I have reviewed the Patient Summary Reports.     I have reviewed the Nursing Notes. I have reviewed the NPO Status.   I have reviewed the Medications.     Review of Systems  Anesthesia Hx:  No problems with previous Anesthesia    Social:  Non-Smoker, No Alcohol Use    Hematology/Oncology:  Hematology Normal   Oncology Normal     EENT/Dental:EENT/Dental Normal   Cardiovascular:   Hypertension    Pulmonary:  Pulmonary Normal    Renal/:  Renal/ Normal     Hepatic/GI:  Hepatic/GI Normal    Musculoskeletal:  Musculoskeletal Normal    OB/GYN/PEDS:  IUP Issues with Current Pregnancy  are Hypertensive Disease    Neurological:  Neurology Normal    Endocrine:  Endocrine Normal    Dermatological:  Skin Normal    Psych:  Psychiatric Normal           Chemistry        Component Value Date/Time     (L) 10/06/2022 1103    K 3.8 10/06/2022 1103     10/06/2022 1103    CO2 23 10/06/2022 1103    BUN 7 10/06/2022 1103    CREATININE 0.68 10/06/2022 1103    GLU 80 10/06/2022 1103        Component Value Date/Time    CALCIUM 8.7 10/06/2022 1103    ALKPHOS 150 (H) 10/06/2022 1103    AST 22 10/06/2022 1103    ALT 25 10/06/2022 1103    BILITOT 0.3 10/06/2022 1103    ESTGFRAFRICA 128 01/04/2021 1029    EGFRNONAA 106 01/04/2021 1029        Lab Results   Component Value Date    WBC 12.93 (H) 10/13/2022    RBC 4.06 (L) 10/13/2022    HGB 10.8 (L) 10/13/2022    MCV 85.2 10/13/2022    MCH 26.6 (L) 10/13/2022    MCHC 31.2 (L) 10/13/2022    RDW 15.5 (H) 10/13/2022     10/13/2022    MPV 10.3 10/13/2022    LYMPH 25.1 (L) 10/13/2022    LYMPH 3.25 10/13/2022    MONO 5.8 10/13/2022    EOS 0.18 10/13/2022    BASO 0.03 10/13/2022         Physical Exam  General: Well nourished, Cooperative, Alert and Oriented    Airway:  Mallampati: II / II  Mouth  Opening: Normal  TM Distance: Normal  Neck ROM: Normal ROM    Dental:  Intact    Chest/Lungs:  Clear to auscultation    Heart:  Rate: Normal  Rhythm: Regular Rhythm  Sounds: Normal        Anesthesia Plan  Type of Anesthesia, risks & benefits discussed:    Anesthesia Type: Spinal  Intra-op Monitoring Plan: Standard ASA Monitors  Post Op Pain Control Plan: multimodal analgesia  Informed Consent: Informed consent signed with the Patient and all parties understand the risks and agree with anesthesia plan.  All questions answered. Patient consented to blood products? Yes  ASA Score: 2  Day of Surgery Review of History & Physical: H&P Update referred to the surgeon/provider.    Ready For Surgery From Anesthesia Perspective.     .

## 2022-10-07 PROBLEM — Z36.9 UNSPECIFIED ANTENATAL SCREENING: Status: ACTIVE | Noted: 2022-10-07

## 2022-10-07 PROBLEM — Z3A.38 38 WEEKS GESTATION OF PREGNANCY: Status: ACTIVE | Noted: 2022-10-07

## 2022-10-07 PROBLEM — O13.3 GESTATIONAL HYPERTENSION W/O SIGNIFICANT PROTEINURIA IN 3RD TRIMESTER: Status: ACTIVE | Noted: 2022-10-07

## 2022-10-07 PROBLEM — O34.219 PREVIOUS CESAREAN DELIVERY, ANTEPARTUM CONDITION OR COMPLICATION: Status: ACTIVE | Noted: 2022-10-07

## 2022-10-13 ENCOUNTER — HOSPITAL ENCOUNTER (INPATIENT)
Facility: HOSPITAL | Age: 37
LOS: 2 days | Discharge: HOME | End: 2022-10-15
Attending: OBSTETRICS & GYNECOLOGY | Admitting: OBSTETRICS & GYNECOLOGY
Payer: MEDICAID

## 2022-10-13 VITALS — OXYGEN SATURATION: 99 % | DIASTOLIC BLOOD PRESSURE: 52 MMHG | SYSTOLIC BLOOD PRESSURE: 107 MMHG | HEART RATE: 72 BPM

## 2022-10-13 DIAGNOSIS — Z98.891 S/P CESAREAN SECTION: Primary | ICD-10-CM

## 2022-10-13 DIAGNOSIS — O34.219 HISTORY OF CESAREAN DELIVERY, CURRENTLY PREGNANT: ICD-10-CM

## 2022-10-13 DIAGNOSIS — Z3A.35 35 WEEKS GESTATION OF PREGNANCY: ICD-10-CM

## 2022-10-13 PROBLEM — Z3A.39 39 WEEKS GESTATION OF PREGNANCY: Status: ACTIVE | Noted: 2022-10-13

## 2022-10-13 LAB
BACTERIA #/AREA URNS HPF: ABNORMAL /HPF
BASOPHILS # BLD AUTO: 0.03 K/UL (ref 0–0.2)
BASOPHILS NFR BLD AUTO: 0.2 % (ref 0–1)
BILIRUB UR QL STRIP: NEGATIVE
CLARITY UR: CLEAR
COLOR UR: YELLOW
DIFFERENTIAL METHOD BLD: ABNORMAL
EOSINOPHIL # BLD AUTO: 0.18 K/UL (ref 0–0.5)
EOSINOPHIL NFR BLD AUTO: 1.4 % (ref 1–4)
ERYTHROCYTE [DISTWIDTH] IN BLOOD BY AUTOMATED COUNT: 15.5 % (ref 11.5–14.5)
GLUCOSE UR STRIP-MCNC: NORMAL MG/DL
HCO3 UR-SCNC: 24.2 MMOL/L
HCO3 UR-SCNC: 30.6 MMOL/L (ref 18–23)
HCT VFR BLD AUTO: 34.6 % (ref 38–47)
HGB BLD-MCNC: 10.8 G/DL (ref 12–16)
HIV 1+O+2 AB SERPL QL: NORMAL
IMM GRANULOCYTES # BLD AUTO: 0.07 K/UL (ref 0–0.04)
IMM GRANULOCYTES NFR BLD: 0.5 % (ref 0–0.4)
INDIRECT COOMBS: NORMAL
KETONES UR STRIP-SCNC: NEGATIVE MG/DL
LEUKOCYTE ESTERASE UR QL STRIP: ABNORMAL
LYMPHOCYTES # BLD AUTO: 3.25 K/UL (ref 1–4.8)
LYMPHOCYTES NFR BLD AUTO: 25.1 % (ref 27–41)
MCH RBC QN AUTO: 26.6 PG (ref 27–31)
MCHC RBC AUTO-ENTMCNC: 31.2 G/DL (ref 32–36)
MCV RBC AUTO: 85.2 FL (ref 80–96)
MONOCYTES # BLD AUTO: 0.75 K/UL (ref 0–0.8)
MONOCYTES NFR BLD AUTO: 5.8 % (ref 2–6)
MPC BLD CALC-MCNC: 10.3 FL (ref 9.4–12.4)
MUCOUS THREADS #/AREA URNS HPF: ABNORMAL /HPF
NEUTROPHILS # BLD AUTO: 8.65 K/UL (ref 1.8–7.7)
NEUTROPHILS NFR BLD AUTO: 67 % (ref 53–65)
NITRITE UR QL STRIP: NEGATIVE
NRBC # BLD AUTO: 0 X10E3/UL
NRBC, AUTO (.00): 0 %
PCO2 BLDA: 39 MMHG (ref 41–51)
PCO2 BLDA: 58 MMHG (ref 35–48)
PH SMN: 7.33 [PH] (ref 7.35–7.45)
PH SMN: 7.4 [PH]
PH UR STRIP: 6 PH UNITS
PLATELET # BLD AUTO: 313 K/UL (ref 150–400)
PO2 BLDA: 29 MMHG
PO2 BLDA: 47 MMHG (ref 25–40)
POC A-ADO2: ABNORMAL MMHG
POC BASE EXCESS: -0.5 MMOL/L
POC BASE EXCESS: 2.9 MMOL/L
POC SATURATED O2: 42 % (ref 95–98)
POC SATURATED O2: 84.5 %
PROT UR QL STRIP: 30
RBC # BLD AUTO: 4.06 M/UL (ref 4.2–5.4)
RBC # UR STRIP: NEGATIVE /UL
RBC #/AREA URNS HPF: ABNORMAL /HPF
RH BLD: NORMAL
SARS-COV-2 RDRP RESP QL NAA+PROBE: NEGATIVE
SP GR UR STRIP: 1.03
SQUAMOUS #/AREA URNS LPF: ABNORMAL /LPF
TRICHOMONAS #/AREA URNS HPF: ABNORMAL /HPF
UROBILINOGEN UR STRIP-ACNC: NORMAL MG/DL
WBC # BLD AUTO: 12.93 K/UL (ref 4.5–11)
WBC #/AREA URNS HPF: ABNORMAL /HPF

## 2022-10-13 PROCEDURE — 85025 COMPLETE CBC W/AUTO DIFF WBC: CPT | Performed by: OBSTETRICS & GYNECOLOGY

## 2022-10-13 PROCEDURE — 27201960 HC SPINAL TRAY: Performed by: NURSE ANESTHETIST, CERTIFIED REGISTERED

## 2022-10-13 PROCEDURE — 27000716 HC OXISENSOR PROBE, ANY SIZE: Performed by: NURSE ANESTHETIST, CERTIFIED REGISTERED

## 2022-10-13 PROCEDURE — 59510 CESAREAN DELIVERY: CPT | Mod: CRNA,,, | Performed by: NURSE ANESTHETIST, CERTIFIED REGISTERED

## 2022-10-13 PROCEDURE — 87186 SC STD MICRODIL/AGAR DIL: CPT | Performed by: OBSTETRICS & GYNECOLOGY

## 2022-10-13 PROCEDURE — 86850 RBC ANTIBODY SCREEN: CPT | Performed by: OBSTETRICS & GYNECOLOGY

## 2022-10-13 PROCEDURE — 27201423 OPTIME MED/SURG SUP & DEVICES STERILE SUPPLY: Performed by: OBSTETRICS & GYNECOLOGY

## 2022-10-13 PROCEDURE — 36415 COLL VENOUS BLD VENIPUNCTURE: CPT | Performed by: OBSTETRICS & GYNECOLOGY

## 2022-10-13 PROCEDURE — 71000039 HC RECOVERY, EACH ADD'L HOUR: Performed by: OBSTETRICS & GYNECOLOGY

## 2022-10-13 PROCEDURE — 82803 BLOOD GASES ANY COMBINATION: CPT

## 2022-10-13 PROCEDURE — 59510 CESAREAN DELIVERY: CPT | Mod: U9,,, | Performed by: OBSTETRICS & GYNECOLOGY

## 2022-10-13 PROCEDURE — 25000003 PHARM REV CODE 250: Performed by: OBSTETRICS & GYNECOLOGY

## 2022-10-13 PROCEDURE — 59510 CESAREAN DELIVERY: CPT | Mod: ANES,,, | Performed by: ANESTHESIOLOGY

## 2022-10-13 PROCEDURE — 63600175 PHARM REV CODE 636 W HCPCS: Performed by: NURSE ANESTHETIST, CERTIFIED REGISTERED

## 2022-10-13 PROCEDURE — 37000008 HC ANESTHESIA 1ST 15 MINUTES: Performed by: OBSTETRICS & GYNECOLOGY

## 2022-10-13 PROCEDURE — 36004725 HC OB OR TIME LEV III - EA ADD 15 MIN: Performed by: OBSTETRICS & GYNECOLOGY

## 2022-10-13 PROCEDURE — 87635 SARS-COV-2 COVID-19 AMP PRB: CPT | Performed by: OBSTETRICS & GYNECOLOGY

## 2022-10-13 PROCEDURE — 71000033 HC RECOVERY, INTIAL HOUR: Performed by: OBSTETRICS & GYNECOLOGY

## 2022-10-13 PROCEDURE — 59510 PR FULL ROUT OBSTE CARE,CESAREAN DELIV: ICD-10-PCS | Mod: U9,,, | Performed by: OBSTETRICS & GYNECOLOGY

## 2022-10-13 PROCEDURE — 63600175 PHARM REV CODE 636 W HCPCS: Performed by: ANESTHESIOLOGY

## 2022-10-13 PROCEDURE — 27000727 HC TRAY FOLEY W-METER 16-18FR

## 2022-10-13 PROCEDURE — 63600175 PHARM REV CODE 636 W HCPCS: Performed by: OBSTETRICS & GYNECOLOGY

## 2022-10-13 PROCEDURE — 51702 INSERT TEMP BLADDER CATH: CPT

## 2022-10-13 PROCEDURE — 27000284 HC CANNULA NASAL: Performed by: NURSE ANESTHETIST, CERTIFIED REGISTERED

## 2022-10-13 PROCEDURE — 59510 PRA FULL ROUT OBSTE CARE,CESAREAN DELIV: ICD-10-PCS | Mod: CRNA,,, | Performed by: NURSE ANESTHETIST, CERTIFIED REGISTERED

## 2022-10-13 PROCEDURE — 36004724 HC OB OR TIME LEV III - 1ST 15 MIN: Performed by: OBSTETRICS & GYNECOLOGY

## 2022-10-13 PROCEDURE — 87077 CULTURE AEROBIC IDENTIFY: CPT | Performed by: OBSTETRICS & GYNECOLOGY

## 2022-10-13 PROCEDURE — 62322 NJX INTERLAMINAR LMBR/SAC: CPT | Performed by: ANESTHESIOLOGY

## 2022-10-13 PROCEDURE — 11000001 HC ACUTE MED/SURG PRIVATE ROOM

## 2022-10-13 PROCEDURE — 87389 HIV-1 AG W/HIV-1&-2 AB AG IA: CPT | Performed by: OBSTETRICS & GYNECOLOGY

## 2022-10-13 PROCEDURE — 37000009 HC ANESTHESIA EA ADD 15 MINS: Performed by: OBSTETRICS & GYNECOLOGY

## 2022-10-13 PROCEDURE — 81001 URINALYSIS AUTO W/SCOPE: CPT | Performed by: OBSTETRICS & GYNECOLOGY

## 2022-10-13 PROCEDURE — 59510 PRA FULL ROUT OBSTE CARE,CESAREAN DELIV: ICD-10-PCS | Mod: ANES,,, | Performed by: ANESTHESIOLOGY

## 2022-10-13 RX ORDER — OXYCODONE HYDROCHLORIDE 5 MG/1
10 TABLET ORAL EVERY 4 HOURS PRN
Status: DISCONTINUED | OUTPATIENT
Start: 2022-10-13 | End: 2022-10-14

## 2022-10-13 RX ORDER — AMOXICILLIN 250 MG
1 CAPSULE ORAL NIGHTLY PRN
Status: DISCONTINUED | OUTPATIENT
Start: 2022-10-13 | End: 2022-10-15 | Stop reason: HOSPADM

## 2022-10-13 RX ORDER — PRENATAL WITH FERROUS FUM AND FOLIC ACID 3080; 920; 120; 400; 22; 1.84; 3; 20; 10; 1; 12; 200; 27; 25; 2 [IU]/1; [IU]/1; MG/1; [IU]/1; MG/1; MG/1; MG/1; MG/1; MG/1; MG/1; UG/1; MG/1; MG/1; MG/1; MG/1
1 TABLET ORAL DAILY
Status: DISCONTINUED | OUTPATIENT
Start: 2022-10-13 | End: 2022-10-15 | Stop reason: HOSPADM

## 2022-10-13 RX ORDER — PROCHLORPERAZINE EDISYLATE 5 MG/ML
5 INJECTION INTRAMUSCULAR; INTRAVENOUS EVERY 6 HOURS PRN
Status: DISCONTINUED | OUTPATIENT
Start: 2022-10-13 | End: 2022-10-13

## 2022-10-13 RX ORDER — OXYTOCIN/RINGER'S LACTATE 30/500 ML
334 PLASTIC BAG, INJECTION (ML) INTRAVENOUS ONCE
Status: COMPLETED | OUTPATIENT
Start: 2022-10-13 | End: 2022-10-13

## 2022-10-13 RX ORDER — SODIUM CHLORIDE, SODIUM LACTATE, POTASSIUM CHLORIDE, CALCIUM CHLORIDE 600; 310; 30; 20 MG/100ML; MG/100ML; MG/100ML; MG/100ML
INJECTION, SOLUTION INTRAVENOUS CONTINUOUS
Status: DISCONTINUED | OUTPATIENT
Start: 2022-10-13 | End: 2022-10-14

## 2022-10-13 RX ORDER — OXYTOCIN/RINGER'S LACTATE 30/500 ML
95 PLASTIC BAG, INJECTION (ML) INTRAVENOUS ONCE
Status: DISCONTINUED | OUTPATIENT
Start: 2022-10-13 | End: 2022-10-14

## 2022-10-13 RX ORDER — ACETAMINOPHEN 325 MG/1
650 TABLET ORAL EVERY 6 HOURS
Status: DISCONTINUED | OUTPATIENT
Start: 2022-10-13 | End: 2022-10-14

## 2022-10-13 RX ORDER — KETOROLAC TROMETHAMINE 30 MG/ML
30 INJECTION, SOLUTION INTRAMUSCULAR; INTRAVENOUS EVERY 6 HOURS
Status: DISPENSED | OUTPATIENT
Start: 2022-10-13 | End: 2022-10-14

## 2022-10-13 RX ORDER — BISACODYL 10 MG
10 SUPPOSITORY, RECTAL RECTAL ONCE AS NEEDED
Status: DISCONTINUED | OUTPATIENT
Start: 2022-10-13 | End: 2022-10-15 | Stop reason: HOSPADM

## 2022-10-13 RX ORDER — METHYLERGONOVINE MALEATE 0.2 MG/ML
200 INJECTION INTRAVENOUS
Status: DISCONTINUED | OUTPATIENT
Start: 2022-10-13 | End: 2022-10-15 | Stop reason: HOSPADM

## 2022-10-13 RX ORDER — DOCUSATE SODIUM 100 MG/1
200 CAPSULE, LIQUID FILLED ORAL 2 TIMES DAILY
Status: DISCONTINUED | OUTPATIENT
Start: 2022-10-13 | End: 2022-10-15 | Stop reason: HOSPADM

## 2022-10-13 RX ORDER — ADHESIVE BANDAGE
30 BANDAGE TOPICAL 2 TIMES DAILY PRN
Status: DISCONTINUED | OUTPATIENT
Start: 2022-10-14 | End: 2022-10-15 | Stop reason: HOSPADM

## 2022-10-13 RX ORDER — CARBOPROST TROMETHAMINE 250 UG/ML
250 INJECTION, SOLUTION INTRAMUSCULAR
Status: DISCONTINUED | OUTPATIENT
Start: 2022-10-13 | End: 2022-10-15 | Stop reason: HOSPADM

## 2022-10-13 RX ORDER — DIPHENHYDRAMINE HCL 25 MG
25 CAPSULE ORAL EVERY 4 HOURS PRN
Status: DISCONTINUED | OUTPATIENT
Start: 2022-10-13 | End: 2022-10-15 | Stop reason: HOSPADM

## 2022-10-13 RX ORDER — OXYCODONE HYDROCHLORIDE 5 MG/1
5 TABLET ORAL EVERY 4 HOURS PRN
Status: DISCONTINUED | OUTPATIENT
Start: 2022-10-13 | End: 2022-10-14

## 2022-10-13 RX ORDER — ONDANSETRON 4 MG/1
8 TABLET, ORALLY DISINTEGRATING ORAL EVERY 8 HOURS PRN
Status: DISCONTINUED | OUTPATIENT
Start: 2022-10-13 | End: 2022-10-15 | Stop reason: HOSPADM

## 2022-10-13 RX ORDER — ONDANSETRON 2 MG/ML
INJECTION INTRAMUSCULAR; INTRAVENOUS
Status: DISCONTINUED | OUTPATIENT
Start: 2022-10-13 | End: 2022-10-13

## 2022-10-13 RX ORDER — SODIUM CHLORIDE 0.9 % (FLUSH) 0.9 %
10 SYRINGE (ML) INJECTION
Status: DISCONTINUED | OUTPATIENT
Start: 2022-10-13 | End: 2022-10-15 | Stop reason: HOSPADM

## 2022-10-13 RX ORDER — HYDROCORTISONE 25 MG/G
CREAM TOPICAL 3 TIMES DAILY PRN
Status: DISCONTINUED | OUTPATIENT
Start: 2022-10-13 | End: 2022-10-15 | Stop reason: HOSPADM

## 2022-10-13 RX ORDER — IBUPROFEN 800 MG/1
800 TABLET ORAL EVERY 8 HOURS PRN
Status: DISCONTINUED | OUTPATIENT
Start: 2022-10-13 | End: 2022-10-15 | Stop reason: HOSPADM

## 2022-10-13 RX ORDER — PROCHLORPERAZINE EDISYLATE 5 MG/ML
5 INJECTION INTRAMUSCULAR; INTRAVENOUS EVERY 6 HOURS PRN
Status: DISCONTINUED | OUTPATIENT
Start: 2022-10-13 | End: 2022-10-15 | Stop reason: HOSPADM

## 2022-10-13 RX ORDER — SIMETHICONE 80 MG
1 TABLET,CHEWABLE ORAL EVERY 6 HOURS PRN
Status: DISCONTINUED | OUTPATIENT
Start: 2022-10-13 | End: 2022-10-15 | Stop reason: HOSPADM

## 2022-10-13 RX ORDER — OXYCODONE AND ACETAMINOPHEN 10; 325 MG/1; MG/1
1 TABLET ORAL EVERY 4 HOURS PRN
Status: DISCONTINUED | OUTPATIENT
Start: 2022-10-13 | End: 2022-10-15 | Stop reason: HOSPADM

## 2022-10-13 RX ORDER — ONDANSETRON 2 MG/ML
4 INJECTION INTRAMUSCULAR; INTRAVENOUS EVERY 6 HOURS PRN
Status: ACTIVE | OUTPATIENT
Start: 2022-10-13 | End: 2022-10-14

## 2022-10-13 RX ORDER — MISOPROSTOL 200 UG/1
800 TABLET ORAL
Status: DISCONTINUED | OUTPATIENT
Start: 2022-10-13 | End: 2022-10-15 | Stop reason: HOSPADM

## 2022-10-13 RX ORDER — PHENYLEPHRINE HYDROCHLORIDE 10 MG/ML
INJECTION INTRAVENOUS
Status: DISCONTINUED | OUTPATIENT
Start: 2022-10-13 | End: 2022-10-13

## 2022-10-13 RX ORDER — KETOROLAC TROMETHAMINE 30 MG/ML
INJECTION, SOLUTION INTRAMUSCULAR; INTRAVENOUS
Status: DISCONTINUED | OUTPATIENT
Start: 2022-10-13 | End: 2022-10-13

## 2022-10-13 RX ORDER — FAMOTIDINE 10 MG/ML
20 INJECTION INTRAVENOUS
Status: DISCONTINUED | OUTPATIENT
Start: 2022-10-13 | End: 2022-10-15 | Stop reason: HOSPADM

## 2022-10-13 RX ORDER — MUPIROCIN 20 MG/G
OINTMENT TOPICAL 2 TIMES DAILY
Status: CANCELLED | OUTPATIENT
Start: 2022-10-13 | End: 2022-10-18

## 2022-10-13 RX ORDER — OXYTOCIN/RINGER'S LACTATE 30/500 ML
95 PLASTIC BAG, INJECTION (ML) INTRAVENOUS ONCE
Status: COMPLETED | OUTPATIENT
Start: 2022-10-13 | End: 2022-10-13

## 2022-10-13 RX ORDER — SODIUM CITRATE AND CITRIC ACID MONOHYDRATE 334; 500 MG/5ML; MG/5ML
30 SOLUTION ORAL
Status: DISCONTINUED | OUTPATIENT
Start: 2022-10-13 | End: 2022-10-15 | Stop reason: HOSPADM

## 2022-10-13 RX ORDER — MUPIROCIN 20 MG/G
OINTMENT TOPICAL
Status: DISCONTINUED | OUTPATIENT
Start: 2022-10-13 | End: 2022-10-15 | Stop reason: HOSPADM

## 2022-10-13 RX ORDER — HYDROCODONE BITARTRATE AND ACETAMINOPHEN 7.5; 325 MG/1; MG/1
1 TABLET ORAL EVERY 4 HOURS PRN
Status: DISCONTINUED | OUTPATIENT
Start: 2022-10-13 | End: 2022-10-15 | Stop reason: HOSPADM

## 2022-10-13 RX ADMIN — Medication 125 ML/HR: at 12:10

## 2022-10-13 RX ADMIN — OXYTOCIN 95 MILLI-UNITS/MIN: 10 INJECTION, SOLUTION INTRAMUSCULAR; INTRAVENOUS at 01:10

## 2022-10-13 RX ADMIN — FAMOTIDINE 20 MG: 10 INJECTION, SOLUTION INTRAVENOUS at 08:10

## 2022-10-13 RX ADMIN — PHENYLEPHRINE HYDROCHLORIDE 100 MCG: 10 INJECTION INTRAVENOUS at 12:10

## 2022-10-13 RX ADMIN — SODIUM CHLORIDE, POTASSIUM CHLORIDE, SODIUM LACTATE AND CALCIUM CHLORIDE 1000 ML: 600; 310; 30; 20 INJECTION, SOLUTION INTRAVENOUS at 06:10

## 2022-10-13 RX ADMIN — CEFAZOLIN 3 G: 1 INJECTION, POWDER, FOR SOLUTION INTRAMUSCULAR; INTRAVENOUS at 12:10

## 2022-10-13 RX ADMIN — KETOROLAC TROMETHAMINE 60 MG: 30 INJECTION, SOLUTION INTRAMUSCULAR at 01:10

## 2022-10-13 RX ADMIN — SODIUM CITRATE AND CITRIC ACID MONOHYDRATE 30 ML: 500; 334 SOLUTION ORAL at 08:10

## 2022-10-13 RX ADMIN — SODIUM CHLORIDE, POTASSIUM CHLORIDE, SODIUM LACTATE AND CALCIUM CHLORIDE: 600; 310; 30; 20 INJECTION, SOLUTION INTRAVENOUS at 07:10

## 2022-10-13 RX ADMIN — KETOROLAC TROMETHAMINE 30 MG: 30 INJECTION, SOLUTION INTRAMUSCULAR at 07:10

## 2022-10-13 RX ADMIN — SODIUM CHLORIDE, POTASSIUM CHLORIDE, SODIUM LACTATE AND CALCIUM CHLORIDE: 600; 310; 30; 20 INJECTION, SOLUTION INTRAVENOUS at 09:10

## 2022-10-13 RX ADMIN — ONDANSETRON 8 MG: 2 INJECTION INTRAMUSCULAR; INTRAVENOUS at 12:10

## 2022-10-13 NOTE — L&D DELIVERY NOTE
[facility] -  Labor and Delivery   Section   Operative Note    SUMMARY     Date of Procedure: 10/13/2022     Procedure: Procedure(s) (LRB):   SECTION (N/A)    Surgeon(s) and Role:     * Mario Foster MD - Primary    Assisting Surgeon: None    Pre-Operative Diagnosis: History of  delivery, currently pregnant [O34.219]    Post-Operative Diagnosis: Post-Op Diagnosis Codes:     * History of  delivery, currently pregnant [O34.219]    Anesthesia: Spinal/Epidural    Technical Procedures Used: repeat LTCS           Description of the Findings of the Procedure: liveborn male     Significant Surgical Tasks Conducted by the Assistant(s), if Applicable: none    Complications: No    Blood Loss: 500 mL     With patient in supine position, the legs are  and Jesus Catheter placed and positioning to supine done.   Abdomen prepped with Chloroprep and 3 minute drying time allowed prior to draping of the abdomen.   Time out taken with OR team members.  Following informed consent the patient was taken to the operating room where spinal anesthesia was administered without difficulty. She was prepped and draped in the usual sterile fashion and placed in the dorsal lithotomy position. A Pfannenstiel skin incision was made with the scalpel and the underlying layer of fascia, entered with the Bovie. The fascial incision was extended laterally. The inferior and superior aspects of the fascial incision were grasped with Oli clamps, elevated and the rectus muscles dissected off bluntly. The rectus muscles were then  in the midline and the peritoneum identified and entered with Metzenbaum scissors. This was then extended superiorly and inferiorly with good visualization of the bladder. The bladder blade was inserted and the vesicouterine peritoneum identified and entered with the Metzenbaum scissors. This incision extended superiorly and inferiorly and a bladder flap  "created digitally. The bladder blade was then reinserted and the uterus incised in a transverse fashion with the scalpel. This was extended laterally. The baby's head was delivered atraumatically. The nose and mouth were bulb suctioned. The cord was clamped and cut infant the infant was handed off to the waiting nurses. Cord blood was sent. Placenta was then removed manually and the uterus cleared of all clots and debris  The uterine incision was repaired with #1 Vicryl in a running locked fashion. A second layer of the same suture was used to obtain hemostasis. The gutters when cleared of all clots and debris and once again hemostasis found be satisfactory.  All instruments were then removed from the abdomen. The fascial incision per 0 Vicryl in a running fashion. Skin was closed with interrupted staples. At the end procedure all sponge lap needle sponge counts correct ×2. Patient taken cover awake and stable condition.        Specimens:   Specimen (24h ago, onward)      None            Condition: Good    Disposition: PACU - hemodynamically stable.    Attestation: Good         Delivery Information for Urban Winter    Birth information:  YOB: [birth year]22   Time of birth: 12:45 PM   Sex: male   Head Delivery Date/Time: 10/13/2022 12:44 PM   Delivery type: , Low Transverse   Gestational Age: 39w0d    Delivery Providers    Delivering clinician: Mario Foster MD           Measurements    Weight: 3886 g  Weight (lbs): 8 lb 9.1 oz  Length: 50.8 cm  Length (in): 20"         Apgars    Living status: Living  Apgars:  1 min.:  5 min.:  10 min.:  15 min.:  20 min.:    Skin color:  1  1       Heart rate:  2  2       Reflex irritability:  2  2       Muscle tone:  2  2       Respiratory effort:  2  2       Total:  9  9       Apgars assigned by: SHERLEY GONZALEZ         Operative Delivery    Forceps attempted?: No  Vacuum extractor attempted?: No         Shoulder Dystocia    Shoulder dystocia " present?: No           Presentation    Presentation: Vertex           Interventions/Resuscitation    Method: Bulb Suctioning, Tactile Stimulation       Cord    Vessels: 3 vessels  Complications: None  Cord Blood Disposition: Lab  Gases Sent?: Yes       Placenta    Placenta delivery date/time: 10/13/2022 124  Placenta removal: Manual removal  Placenta appearance: Intact  Placenta disposition: discarded           Labor Events:       labor:       Labor Onset Date/Time:         Dilation Complete Date/Time:         Start Pushing Date/Time:       Rupture Date/Time:            Rupture type:            Fluid Amount:         Fluid Color:          steroids:       Antibiotics given for GBS:       Induction:       Indications for induction:        Augmentation:       Indications for augmentation:       Labor complications:       Additional complications:          Cervical ripening:                     Delivery:      Episiotomy:       Indication for Episiotomy:       Perineal Lacerations:   Repaired:      Periurethral Laceration:   Repaired:     Labial Laceration:   Repaired:     Sulcus Laceration:   Repaired:     Vaginal Laceration:   Repaired:     Cervical Laceration:   Repaired:     Repair suture:       Repair # of packets:       Last Value - EBL - Nursing (mL):       Sum - EBL - Nursing (mL): 500     Last Value - EBL - Anesthesia (mL): 500        Calculated QBL (mL):         Vaginal Sweep Performed:       Surgicount Correct:         Other providers:       Anesthesia    Method: Spinal          Details (if applicable):  Trial of Labor No    Categorization: Repeat    Priority: Routine   Indications for : Repeat Section   Incision Type:       Additional  information:  Forceps:    Vacuum:    Breech:    Observed anomalies    Other (Comments):

## 2022-10-13 NOTE — ANESTHESIA POSTPROCEDURE EVALUATION
Anesthesia Post Evaluation    Patient: Sidra Winter    Procedure(s) Performed: Procedure(s) (LRB):   SECTION (N/A)    Final Anesthesia Type: spinal      Patient location during evaluation: labor & delivery  Patient participation: Yes- Able to Participate  Level of consciousness: awake and alert  Post-procedure vital signs: reviewed and stable  Pain management: adequate  Airway patency: patent    PONV status at discharge: No PONV  Anesthetic complications: no      Cardiovascular status: blood pressure returned to baseline  Respiratory status: unassisted  Hydration status: euvolemic  Follow-up not needed.          Vitals Value Taken Time   /53 10/13/22 1414   Temp 98.1 10/13/22 1415   Pulse 47 10/13/22 1414   Resp 18 10/13/22 1415   SpO2 99 % 10/13/22 1414   Vitals shown include unvalidated device data.      Event Time   Out of Recovery 10/13/2022 15:15:00         Pain/Christy Score: No data recorded

## 2022-10-13 NOTE — PLAN OF CARE
Problem: Adult Inpatient Plan of Care  Goal: Plan of Care Review  10/13/2022 1414 by Miguel Foster RN  Outcome: Ongoing, Progressing  10/13/2022 1026 by Miguel Foster RN  Outcome: Ongoing, Progressing  Goal: Patient-Specific Goal (Individualized)  10/13/2022 1414 by Miguel Foster RN  Outcome: Ongoing, Progressing  10/13/2022 1026 by Miguel Foster RN  Outcome: Ongoing, Progressing  Goal: Absence of Hospital-Acquired Illness or Injury  10/13/2022 1414 by Miguel Foster RN  Outcome: Ongoing, Progressing  10/13/2022 1026 by Miguel Foster RN  Outcome: Ongoing, Progressing  Goal: Optimal Comfort and Wellbeing  10/13/2022 1414 by Miguel Foster RN  Outcome: Ongoing, Progressing  10/13/2022 1026 by Miguel Foster RN  Outcome: Ongoing, Progressing  Goal: Readiness for Transition of Care  10/13/2022 1414 by Miguel Foster RN  Outcome: Ongoing, Progressing  10/13/2022 1026 by Miguel Foster RN  Outcome: Ongoing, Progressing     Problem:  Fall Injury Risk  Goal: Absence of Fall, Infant Drop and Related Injury  10/13/2022 1414 by Miguel Foster RN  Outcome: Ongoing, Progressing  10/13/2022 1026 by Miguel Foster RN  Outcome: Ongoing, Progressing     Problem: Infection  Goal: Absence of Infection Signs and Symptoms  10/13/2022 1414 by Miguel Foster RN  Outcome: Ongoing, Progressing  10/13/2022 1026 by Miguel Foster RN  Outcome: Ongoing, Progressing     Problem: Bariatric Environmental Safety  Goal: Safety Maintained with Care  10/13/2022 1414 by Miguel Foster RN  Outcome: Ongoing, Progressing  10/13/2022 1026 by Miguel Foster RN  Outcome: Ongoing, Progressing     Problem: Adjustment to Role Transition (Postpartum  Delivery)  Goal: Successful Maternal Role Transition  Outcome: Ongoing, Progressing     Problem: Bleeding (Postpartum  Delivery)  Goal: Hemostasis  Outcome: Ongoing, Progressing     Problem: Infection (Postpartum   Delivery)  Goal: Absence of Infection Signs and Symptoms  Outcome: Ongoing, Progressing     Problem: Pain (Postpartum  Delivery)  Goal: Acceptable Pain Control  Outcome: Ongoing, Progressing     Problem: Postoperative Nausea and Vomiting (Postpartum  Delivery)  Goal: Nausea and Vomiting Relief  Outcome: Ongoing, Progressing     Problem: Postoperative Urinary Retention (Postpartum  Delivery)  Goal: Effective Urinary Elimination  Outcome: Ongoing, Progressing

## 2022-10-13 NOTE — PLAN OF CARE
Problem: Adult Inpatient Plan of Care  Goal: Plan of Care Review  Outcome: Ongoing, Progressing  Goal: Patient-Specific Goal (Individualized)  Outcome: Ongoing, Progressing  Goal: Absence of Hospital-Acquired Illness or Injury  Outcome: Ongoing, Progressing  Goal: Optimal Comfort and Wellbeing  Outcome: Ongoing, Progressing  Goal: Readiness for Transition of Care  Outcome: Ongoing, Progressing     Problem:  Fall Injury Risk  Goal: Absence of Fall, Infant Drop and Related Injury  Outcome: Ongoing, Progressing     Problem: Infection  Goal: Absence of Infection Signs and Symptoms  Outcome: Ongoing, Progressing     Problem: Bariatric Environmental Safety  Goal: Safety Maintained with Care  Outcome: Ongoing, Progressing

## 2022-10-13 NOTE — TRANSFER OF CARE
"Anesthesia Transfer of Care Note    Patient: Sidra Winter    Procedure(s) Performed: Procedure(s) (LRB):   SECTION (N/A)    Anesthesia Type: spinal    Transport from OR: Transported from OR on room air with adequate spontaneous ventilation    Post pain: adequate analgesia    Post assessment: no apparent anesthetic complications and tolerated procedure well    Post vital signs: stable    Level of consciousness: responds to stimulation and awake    Nausea/Vomiting: no nausea/vomiting    Complications: none    Transfer of care protocol was followed      Last vitals:   Visit Vitals  BP (!) 107/52 (BP Location: Right arm, Patient Position: Lying)   Pulse 76   Temp 36.7 °C (98 °F)   Resp 16   Ht 5' 9" (1.753 m)   Wt 126.6 kg (279 lb)   LMP 2022 (Exact Date)   SpO2 99%   Breastfeeding No   BMI 41.20 kg/m²     "

## 2022-10-13 NOTE — ANESTHESIA PROCEDURE NOTES
Spinal    Diagnosis: c section  Patient location during procedure: OB  Start time: 10/13/2022 12:11 PM  Timeout: 10/13/2022 12:11 PM  End time: 10/13/2022 12:13 PM    Staffing  Authorizing Provider: Franko Roca MD  Performing Provider: Franko Roca MD    Preanesthetic Checklist  Completed: patient identified, IV checked, risks and benefits discussed, surgical consent, monitors and equipment checked, pre-op evaluation and timeout performed  Spinal Block  Patient position: sitting  Prep: Betadine  Patient monitoring: heart rate, continuous pulse ox, continuous capnometry and frequent blood pressure checks  Approach: midline  Location: L3-4  Injection technique: single shot  CSF Fluid: clear free-flowing CSF  Needle  Needle type: Quincke   Needle gauge: 22 G  Needle length: 4 in  Needle localization: anatomical landmarks  Assessment  Sensory level: T8   Dermatomal levels determined by alcohol wipe  Ease of block: easy  Patient's tolerance of the procedure: comfortable throughout block and no complaints

## 2022-10-14 PROBLEM — Z3A.39 39 WEEKS GESTATION OF PREGNANCY: Status: RESOLVED | Noted: 2022-10-13 | Resolved: 2022-10-14

## 2022-10-14 PROBLEM — Z98.891 S/P CESAREAN SECTION: Status: ACTIVE | Noted: 2022-10-07

## 2022-10-14 LAB
BASOPHILS # BLD AUTO: 0.03 K/UL (ref 0–0.2)
BASOPHILS NFR BLD AUTO: 0.3 % (ref 0–1)
DIFFERENTIAL METHOD BLD: ABNORMAL
EOSINOPHIL # BLD AUTO: 0.19 K/UL (ref 0–0.5)
EOSINOPHIL NFR BLD AUTO: 1.6 % (ref 1–4)
ERYTHROCYTE [DISTWIDTH] IN BLOOD BY AUTOMATED COUNT: 15.7 % (ref 11.5–14.5)
HCT VFR BLD AUTO: 32.6 % (ref 38–47)
HGB BLD-MCNC: 9.9 G/DL (ref 12–16)
IMM GRANULOCYTES # BLD AUTO: 0.06 K/UL (ref 0–0.04)
IMM GRANULOCYTES NFR BLD: 0.5 % (ref 0–0.4)
LYMPHOCYTES # BLD AUTO: 3.12 K/UL (ref 1–4.8)
LYMPHOCYTES NFR BLD AUTO: 26.9 % (ref 27–41)
MCH RBC QN AUTO: 26.5 PG (ref 27–31)
MCHC RBC AUTO-ENTMCNC: 30.4 G/DL (ref 32–36)
MCV RBC AUTO: 87.4 FL (ref 80–96)
MONOCYTES # BLD AUTO: 0.75 K/UL (ref 0–0.8)
MONOCYTES NFR BLD AUTO: 6.5 % (ref 2–6)
MPC BLD CALC-MCNC: 10.5 FL (ref 9.4–12.4)
NEUTROPHILS # BLD AUTO: 7.44 K/UL (ref 1.8–7.7)
NEUTROPHILS NFR BLD AUTO: 64.2 % (ref 53–65)
NRBC # BLD AUTO: 0 X10E3/UL
NRBC, AUTO (.00): 0 %
PLATELET # BLD AUTO: 252 K/UL (ref 150–400)
RBC # BLD AUTO: 3.73 M/UL (ref 4.2–5.4)
WBC # BLD AUTO: 11.59 K/UL (ref 4.5–11)

## 2022-10-14 PROCEDURE — 11000001 HC ACUTE MED/SURG PRIVATE ROOM

## 2022-10-14 PROCEDURE — 25000003 PHARM REV CODE 250: Performed by: OBSTETRICS & GYNECOLOGY

## 2022-10-14 PROCEDURE — 36415 COLL VENOUS BLD VENIPUNCTURE: CPT | Performed by: OBSTETRICS & GYNECOLOGY

## 2022-10-14 PROCEDURE — 63600175 PHARM REV CODE 636 W HCPCS: Performed by: ANESTHESIOLOGY

## 2022-10-14 PROCEDURE — 85025 COMPLETE CBC W/AUTO DIFF WBC: CPT | Performed by: OBSTETRICS & GYNECOLOGY

## 2022-10-14 RX ADMIN — Medication 1 TABLET: at 09:10

## 2022-10-14 RX ADMIN — KETOROLAC TROMETHAMINE 30 MG: 30 INJECTION, SOLUTION INTRAMUSCULAR at 01:10

## 2022-10-14 RX ADMIN — DOCUSATE SODIUM 200 MG: 100 CAPSULE, LIQUID FILLED ORAL at 10:10

## 2022-10-14 RX ADMIN — IBUPROFEN 800 MG: 800 TABLET ORAL at 06:10

## 2022-10-14 RX ADMIN — HYDROCODONE BITARTRATE AND ACETAMINOPHEN 1 TABLET: 7.5; 325 TABLET ORAL at 10:10

## 2022-10-14 RX ADMIN — IBUPROFEN 800 MG: 800 TABLET ORAL at 09:10

## 2022-10-14 RX ADMIN — HYDROCODONE BITARTRATE AND ACETAMINOPHEN 1 TABLET: 7.5; 325 TABLET ORAL at 03:10

## 2022-10-14 NOTE — PLAN OF CARE
Problem: Adult Inpatient Plan of Care  Goal: Plan of Care Review  Outcome: Ongoing, Progressing  Goal: Patient-Specific Goal (Individualized)  Outcome: Ongoing, Progressing  Goal: Absence of Hospital-Acquired Illness or Injury  Outcome: Ongoing, Progressing  Goal: Optimal Comfort and Wellbeing  Outcome: Ongoing, Progressing  Goal: Readiness for Transition of Care  Outcome: Ongoing, Progressing     Problem:  Fall Injury Risk  Goal: Absence of Fall, Infant Drop and Related Injury  Outcome: Ongoing, Progressing     Problem: Infection  Goal: Absence of Infection Signs and Symptoms  Outcome: Ongoing, Progressing     Problem: Bariatric Environmental Safety  Goal: Safety Maintained with Care  Outcome: Ongoing, Progressing     Problem: Pain (Postpartum  Delivery)  Goal: Acceptable Pain Control  Outcome: Ongoing, Progressing     Problem: Infection (Postpartum  Delivery)  Goal: Absence of Infection Signs and Symptoms  Outcome: Ongoing, Progressing     Problem: Bleeding (Postpartum  Delivery)  Goal: Hemostasis  Outcome: Ongoing, Progressing

## 2022-10-14 NOTE — H&P
[facility] -  Labor and Delivery  Obstetrics  History & Physical    Patient Name: Sidra Simon  MRN: [id number]  Admission Date: 10/13/2022  Primary Care Provider: Braxton Ornelas MD    Subjective:     Principal Problem:39 weeks gestation of pregnancy    History of Present Illness:  This is a 37 y/o  admitted at 39 weeks for repeat  section. Prenatal care with Dr. Mckeon complicated by h/o  x 1.      Obstetric HPI:  Patient reports None contractions, active fetal movement, No vaginal bleeding , no loss of fluid     This pregnancy has been complicated by  x 1    OB History    Para Term  AB Living   2 2 2 0 0 2   SAB IAB Ectopic Multiple Live Births   0 0 0 0 2      # Outcome Date GA Lbr Matt/2nd Weight Sex Delivery Anes PTL Lv   2 Term 10/13/22 39w0d  3.886 kg (8 lb 9.1 oz) M CS-LTranv Spinal  RODY      Name: KAYLEN SIMON      Apgar1: 9  Apgar5: 9   1 Term 21 39w0d  3.969 kg (8 lb 12 oz) F CS-Unspec   RODY     Past Medical History:   Diagnosis Date    History of COVID-19     Scoliosis      Past Surgical History:   Procedure Laterality Date     SECTION  2021       PTA Medications   Medication Sig    calcium carbonate/vitamin D3 (CALCIUM+D ORAL) Take by mouth.    prenatal 25/iron fum/folic/dha (PRENATAL-1 ORAL) Take by mouth.       Review of patient's allergies indicates:   Allergen Reactions    Adhesive Rash        Family History       Problem Relation (Age of Onset)    Ankylosing spondylitis Mother    Breast cancer Maternal Grandfather, Maternal Aunt    Diabetes Paternal Grandfather, Maternal Grandfather    Gestational diabetes Brother    Irritable bowel syndrome Mother    Osteoarthritis Mother    Raynaud syndrome Mother    Sjogren's syndrome Mother          Tobacco Use    Smoking status: Former    Smokeless tobacco: Never   Substance and Sexual Activity    Alcohol use: Not Currently     Comment: Socially    Drug use: Not  Currently    Sexual activity: Yes     Partners: Male     Birth control/protection: None     Review of Systems   Constitutional:  Negative for activity change, appetite change, fatigue and unexpected weight change.   HENT: Negative.     Respiratory:  Negative for cough, shortness of breath and wheezing.    Cardiovascular:  Negative for chest pain, palpitations and leg swelling.   Gastrointestinal:  Negative for abdominal pain, bloating, blood in stool, constipation, diarrhea, nausea, vomiting and reflux.   Genitourinary:  Negative for bladder incontinence, decreased libido, dysmenorrhea, dyspareunia, dysuria, flank pain, frequency, menorrhagia, menstrual problem, pelvic pain, urgency, vaginal bleeding, vaginal discharge, postcoital bleeding and vaginal odor.   Musculoskeletal:  Negative for back pain.   Integumentary:  Negative for rash, acne, hair changes, mole/lesion, breast mass, nipple discharge, breast skin changes and breast tenderness.   Neurological:  Negative for headaches.   Psychiatric/Behavioral:  Negative for depression and sleep disturbance. The patient is not nervous/anxious.    Breast: Negative for asymmetry, breast self exam, lump, mass, nipple discharge, skin changes and tenderness   Objective:     Vital Signs (Most Recent):  Temp: 98.1 °F (36.7 °C) (10/13/22 1959)  Pulse: (!) 54 (10/13/22 1959)  Resp: 18 (10/13/22 1959)  BP: 129/61 (10/13/22 1546)  SpO2: 98 % (10/13/22 1959)   Vital Signs (24h Range):  Temp:  [98 °F (36.7 °C)-98.2 °F (36.8 °C)] 98.1 °F (36.7 °C)  Pulse:  [47-78] 54  Resp:  [16-18] 18  SpO2:  [97 %-100 %] 98 %  BP: (102-136)/(52-87) 129/61     Weight: 126.6 kg (279 lb)  Body mass index is 41.2 kg/m².    FHT: 150sCat 1 (reassuring)  TOCO:  Q n/a minutes    Physical Exam:   Constitutional: She is oriented to person, place, and time. She appears well-developed and well-nourished.    HENT:   Head: Normocephalic and atraumatic.    Eyes: Pupils are equal, round, and reactive to light.      Cardiovascular:  Normal rate, regular rhythm, normal heart sounds and intact distal pulses.      Exam reveals no clubbing, no cyanosis and no edema.        Pulmonary/Chest: Effort normal and breath sounds normal.        Abdominal: Soft. Bowel sounds are normal.     Genitourinary:    Vagina and uterus normal.             Musculoskeletal: Normal range of motion and moves all extremeties. No edema.       Neurological: She is alert and oriented to person, place, and time.    Skin: Skin is warm and dry. No cyanosis. Nails show no clubbing.    Psychiatric: She has a normal mood and affect. Her behavior is normal. Judgment and thought content normal.       Significant Labs:  Lab Results   Component Value Date    GROUPTRH O POS 10/13/2022    HEPBSAG Non-Reactive 04/11/2022       I have personallly reviewed all pertinent lab results from the last 24 hours.  Recent Lab Results         10/13/22  1312   10/13/22  1255   10/13/22  0616   10/13/22  0558        POC A-aDO2             Appearance, UA       Clear       Bacteria, UA       Many       Baso #     0.03         Basophil %     0.2         Bilirubin (UA)       Negative       Color, UA       Yellow       ID NOW COVID-19, ([facility])       Negative       Differential Type     Auto         Eos #     0.18         Eosinophil %     1.4         Glucose, UA       Normal       Group & Rh     O POS         Hematocrit     34.6         Hemoglobin     10.8         HIV 1/2 Ag/Ab     Non-Reactive         Immature Grans (Abs)     0.07         Immature Granulocytes     0.5         INDIRECT LEDY     NEG         Ketones, UA       Negative       Leukocytes, UA       Trace       Lymph #     3.25         Lymph %     25.1         MCH     26.6         MCHC     31.2         MCV     85.2         Mono #     0.75         Mono %     5.8         MPV     10.3         Mucus, UA       Moderate       Neutrophils, Abs     8.65         Neutrophils Relative     67.0         NITRITE UA       Negative       nRBC      0.0         NUCLEATED RBC ABSOLUTE     0.00         Occult Blood UA       Negative       pH, UA       6.0       Platelets     313         POC Base Excess -0.5   2.9           POC HCO3 24.2   30.6           POC PCO2 39   58           POC PH 7.40   7.33           POC PO2 47   29           POC SATURATED O2 84.5   42.0           Protein, UA       30       RBC     4.06         RBC, UA       None Seen       RDW     15.5         Specific [place], UA       1.026       Squam Epithel, UA       Moderate       Trichomonas, UA       None Seen       UROBILINOGEN UA       Normal       WBC, UA       0-5       WBC     12.93               Assessment/Plan:     36 y.o. female  at 39w0d for:    * 39 weeks gestation of pregnancy  Admit to L&D for repeat  section    Previous  delivery, antepartum condition or complication  noted      Mario Foster MD  Obstetrics  [facility] -  Labor and Delivery

## 2022-10-14 NOTE — PLAN OF CARE
Problem: Adult Inpatient Plan of Care  Goal: Plan of Care Review  Outcome: Ongoing, Progressing  Goal: Patient-Specific Goal (Individualized)  Outcome: Ongoing, Progressing  Goal: Absence of Hospital-Acquired Illness or Injury  Outcome: Ongoing, Progressing  Goal: Optimal Comfort and Wellbeing  Outcome: Ongoing, Progressing  Goal: Readiness for Transition of Care  Outcome: Ongoing, Progressing     Problem:  Fall Injury Risk  Goal: Absence of Fall, Infant Drop and Related Injury  Outcome: Ongoing, Progressing     Problem: Infection  Goal: Absence of Infection Signs and Symptoms  Outcome: Ongoing, Progressing     Problem: Bariatric Environmental Safety  Goal: Safety Maintained with Care  Outcome: Ongoing, Progressing     Problem: Adjustment to Role Transition (Postpartum  Delivery)  Goal: Successful Maternal Role Transition  Outcome: Ongoing, Progressing     Problem: Bleeding (Postpartum  Delivery)  Goal: Hemostasis  Outcome: Ongoing, Progressing     Problem: Infection (Postpartum  Delivery)  Goal: Absence of Infection Signs and Symptoms  Outcome: Ongoing, Progressing     Problem: Pain (Postpartum  Delivery)  Goal: Acceptable Pain Control  Outcome: Ongoing, Progressing     Problem: Postoperative Nausea and Vomiting (Postpartum  Delivery)  Goal: Nausea and Vomiting Relief  Outcome: Ongoing, Progressing     Problem: Postoperative Urinary Retention (Postpartum  Delivery)  Goal: Effective Urinary Elimination  Outcome: Ongoing, Progressing

## 2022-10-14 NOTE — SUBJECTIVE & OBJECTIVE
Obstetric HPI:  Patient reports None contractions, active fetal movement, No vaginal bleeding , no loss of fluid     This pregnancy has been complicated by  x 1    OB History    Para Term  AB Living   2 2 2 0 0 2   SAB IAB Ectopic Multiple Live Births   0 0 0 0 2      # Outcome Date GA Lbr Matt/2nd Weight Sex Delivery Anes PTL Lv   2 Term 10/13/22 39w0d  3.886 kg (8 lb 9.1 oz) M CS-LTranv Spinal  RODY      Name: KAYLEN SIMON      Apgar1: 9  Apgar5: 9   1 Term 21 39w0d  3.969 kg (8 lb 12 oz) F CS-Unspec   RODY     Past Medical History:   Diagnosis Date    History of COVID-19     Scoliosis      Past Surgical History:   Procedure Laterality Date     SECTION  2021       PTA Medications   Medication Sig    calcium carbonate/vitamin D3 (CALCIUM+D ORAL) Take by mouth.    prenatal 25/iron fum/folic/dha (PRENATAL-1 ORAL) Take by mouth.       Review of patient's allergies indicates:   Allergen Reactions    Adhesive Rash        Family History       Problem Relation (Age of Onset)    Ankylosing spondylitis Mother    Breast cancer Maternal Grandfather, Maternal Aunt    Diabetes Paternal Grandfather, Maternal Grandfather    Gestational diabetes Brother    Irritable bowel syndrome Mother    Osteoarthritis Mother    Raynaud syndrome Mother    Sjogren's syndrome Mother          Tobacco Use    Smoking status: Former    Smokeless tobacco: Never   Substance and Sexual Activity    Alcohol use: Not Currently     Comment: Socially    Drug use: Not Currently    Sexual activity: Yes     Partners: Male     Birth control/protection: None     Review of Systems   Constitutional:  Negative for activity change, appetite change, fatigue and unexpected weight change.   HENT: Negative.     Respiratory:  Negative for cough, shortness of breath and wheezing.    Cardiovascular:  Negative for chest pain, palpitations and leg swelling.   Gastrointestinal:  Negative for abdominal pain, bloating, blood in stool,  constipation, diarrhea, nausea, vomiting and reflux.   Genitourinary:  Negative for bladder incontinence, decreased libido, dysmenorrhea, dyspareunia, dysuria, flank pain, frequency, menorrhagia, menstrual problem, pelvic pain, urgency, vaginal bleeding, vaginal discharge, postcoital bleeding and vaginal odor.   Musculoskeletal:  Negative for back pain.   Integumentary:  Negative for rash, acne, hair changes, mole/lesion, breast mass, nipple discharge, breast skin changes and breast tenderness.   Neurological:  Negative for headaches.   Psychiatric/Behavioral:  Negative for depression and sleep disturbance. The patient is not nervous/anxious.    Breast: Negative for asymmetry, breast self exam, lump, mass, nipple discharge, skin changes and tenderness   Objective:     Vital Signs (Most Recent):  Temp: 98.1 °F (36.7 °C) (10/13/22 1959)  Pulse: (!) 54 (10/13/22 1959)  Resp: 18 (10/13/22 1959)  BP: 129/61 (10/13/22 1546)  SpO2: 98 % (10/13/22 1959)   Vital Signs (24h Range):  Temp:  [98 °F (36.7 °C)-98.2 °F (36.8 °C)] 98.1 °F (36.7 °C)  Pulse:  [47-78] 54  Resp:  [16-18] 18  SpO2:  [97 %-100 %] 98 %  BP: (102-136)/(52-87) 129/61     Weight: 126.6 kg (279 lb)  Body mass index is 41.2 kg/m².    FHT: 150sCat 1 (reassuring)  TOCO:  Q n/a minutes    Physical Exam:   Constitutional: She is oriented to person, place, and time. She appears well-developed and well-nourished.    HENT:   Head: Normocephalic and atraumatic.    Eyes: Pupils are equal, round, and reactive to light.     Cardiovascular:  Normal rate, regular rhythm, normal heart sounds and intact distal pulses.      Exam reveals no clubbing, no cyanosis and no edema.        Pulmonary/Chest: Effort normal and breath sounds normal.        Abdominal: Soft. Bowel sounds are normal.     Genitourinary:    Vagina and uterus normal.             Musculoskeletal: Normal range of motion and moves all extremeties. No edema.       Neurological: She is alert and oriented to person,  place, and time.    Skin: Skin is warm and dry. No cyanosis. Nails show no clubbing.    Psychiatric: She has a normal mood and affect. Her behavior is normal. Judgment and thought content normal.       Significant Labs:  Lab Results   Component Value Date    GROUPTRH O POS 10/13/2022    HEPBSAG Non-Reactive 04/11/2022       I have personallly reviewed all pertinent lab results from the last 24 hours.  Recent Lab Results         10/13/22  1312   10/13/22  1255   10/13/22  0616   10/13/22  0558        POC A-aDO2             Appearance, UA       Clear       Bacteria, UA       Many       Baso #     0.03         Basophil %     0.2         Bilirubin (UA)       Negative       Color, UA       Yellow       ID NOW COVID-19, ([facility])       Negative       Differential Type     Auto         Eos #     0.18         Eosinophil %     1.4         Glucose, UA       Normal       Group & Rh     O POS         Hematocrit     34.6         Hemoglobin     10.8         HIV 1/2 Ag/Ab     Non-Reactive         Immature Grans (Abs)     0.07         Immature Granulocytes     0.5         INDIRECT LEDY     NEG         Ketones, UA       Negative       Leukocytes, UA       Trace       Lymph #     3.25         Lymph %     25.1         MCH     26.6         MCHC     31.2         MCV     85.2         Mono #     0.75         Mono %     5.8         MPV     10.3         Mucus, UA       Moderate       Neutrophils, Abs     8.65         Neutrophils Relative     67.0         NITRITE UA       Negative       nRBC     0.0         NUCLEATED RBC ABSOLUTE     0.00         Occult Blood UA       Negative       pH, UA       6.0       Platelets     313         POC Base Excess -0.5   2.9           POC HCO3 24.2   30.6           POC PCO2 39   58           POC PH 7.40   7.33           POC PO2 47   29           POC SATURATED O2 84.5   42.0           Protein, UA       30       RBC     4.06         RBC, UA       None Seen       RDW     15.5         Specific [place], UA        1.026       Squam Epithel, UA       Moderate       Trichomonas, UA       None Seen       UROBILINOGEN UA       Normal       WBC, UA       0-5       WBC     12.93

## 2022-10-14 NOTE — LACTATION NOTE
This note was copied from a baby's chart.  Breastfeeding rounds done, mom reports infant latching well, mom to call with any needs

## 2022-10-14 NOTE — HPI
This is a 35 y/o  admitted at 39 weeks for repeat  section. Prenatal care with Dr. Mckeon complicated by h/o  x 1.

## 2022-10-14 NOTE — PROGRESS NOTES
[facility] -  Labor and Delivery  Obstetrics  Postpartum Progress Note    Patient Name: Sidra Winter  MRN: [id number]  Admission Date: 10/13/2022  Hospital Length of Stay: 1 days  Attending Physician: Mario Foster MD  Primary Care Provider: Braxton Ornelas MD    Subjective:     Principal Problem:S/P  section    Hospital Course:  No notes on file    Interval History: POD#1    She is doing well this morning. She is tolerating a regular diet without nausea or vomiting. She is voiding spontaneously. She is ambulating. She has passed flatus, and has not a BM. Vaginal bleeding is mild. She denies fever or chills. Abdominal pain is mild and controlled with oral medications. She Is breastfeeding. She desires circumcision for her male baby: yes.    Objective:     Vital Signs (Most Recent):  Temp: 98.2 °F (36.8 °C) (10/14/22 0916)  Pulse: 67 (10/14/22 0916)  Resp: 18 (10/14/22 0916)  BP: (!) 143/72 (10/14/22 0916)  SpO2: 98 % (10/13/22 2054)   Vital Signs (24h Range):  Temp:  [98 °F (36.7 °C)-98.2 °F (36.8 °C)] 98.2 °F (36.8 °C)  Pulse:  [47-76] 67  Resp:  [16-18] 18  SpO2:  [97 %-100 %] 98 %  BP: (102-143)/(52-73) 143/72     Weight: 126.6 kg (279 lb)  Body mass index is 41.2 kg/m².      Intake/Output Summary (Last 24 hours) at 10/14/2022 1011  Last data filed at 10/14/2022 0607  Gross per 24 hour   Intake --   Output 1900 ml   Net -1900 ml         Significant Labs:  Lab Results   Component Value Date    GROUPTRH O POS 10/13/2022    HEPBSAG Non-Reactive 2022     Recent Labs   Lab 10/14/22  0434   HGB 9.9*   HCT 32.6*       I have personallly reviewed all pertinent lab results from the last 24 hours.  Recent Lab Results         10/14/22  0434   10/13/22  1312   10/13/22  1255        POC A-aDO2           Baso # 0.03           Basophil % 0.3           Differential Type Auto           Eos # 0.19           Eosinophil % 1.6           Hematocrit 32.6           Hemoglobin 9.9           Immature  Grans (Abs) 0.06           Immature Granulocytes 0.5           Lymph # 3.12           Lymph % 26.9           MCH 26.5           MCHC 30.4           MCV 87.4           Mono # 0.75           Mono % 6.5           MPV 10.5           Neutrophils, Abs 7.44           Neutrophils Relative 64.2           nRBC 0.0           NUCLEATED RBC ABSOLUTE 0.00           Platelets 252           POC Base Excess   -0.5   2.9       POC HCO3   24.2   30.6       POC PCO2   39   58       POC PH   7.40   7.33       POC PO2   47   29       POC SATURATED O2   84.5   42.0       RBC 3.73           RDW 15.7           WBC 11.59                   Physical Exam:   Constitutional: She is oriented to person, place, and time. She appears well-developed and well-nourished.    HENT:   Head: Normocephalic and atraumatic.    Eyes: Pupils are equal, round, and reactive to light. EOM are normal.     Cardiovascular:  Normal rate, regular rhythm and normal heart sounds.      Exam reveals no clubbing, no cyanosis and no edema.        Pulmonary/Chest: Effort normal and breath sounds normal.        Abdominal: Soft. Bowel sounds are normal. She exhibits abdominal incision. She exhibits no distension. There is abdominal tenderness.     Genitourinary:    Uterus normal.             Musculoskeletal: Normal range of motion and moves all extremeties.       Neurological: She is alert and oriented to person, place, and time.    Skin: Skin is warm and dry. No cyanosis. Nails show no clubbing.    Psychiatric: She has a normal mood and affect. Her behavior is normal. Judgment and thought content normal.     Assessment/Plan:     36 y.o. female  for:    * S/P  section  Routine postop and postpartum care        Disposition: As patient meets milestones, will plan to discharge in the a.m..    Mario Foster MD  Obstetrics  [facility] -  Labor and Delivery

## 2022-10-14 NOTE — SUBJECTIVE & OBJECTIVE
Interval History: POD#1    She is doing well this morning. She is tolerating a regular diet without nausea or vomiting. She is voiding spontaneously. She is ambulating. She has passed flatus, and has not a BM. Vaginal bleeding is mild. She denies fever or chills. Abdominal pain is mild and controlled with oral medications. She Is breastfeeding. She desires circumcision for her male baby: yes.    Objective:     Vital Signs (Most Recent):  Temp: 98.2 °F (36.8 °C) (10/14/22 0916)  Pulse: 67 (10/14/22 0916)  Resp: 18 (10/14/22 0916)  BP: (!) 143/72 (10/14/22 0916)  SpO2: 98 % (10/13/22 2054)   Vital Signs (24h Range):  Temp:  [98 °F (36.7 °C)-98.2 °F (36.8 °C)] 98.2 °F (36.8 °C)  Pulse:  [47-76] 67  Resp:  [16-18] 18  SpO2:  [97 %-100 %] 98 %  BP: (102-143)/(52-73) 143/72     Weight: 126.6 kg (279 lb)  Body mass index is 41.2 kg/m².      Intake/Output Summary (Last 24 hours) at 10/14/2022 1011  Last data filed at 10/14/2022 0607  Gross per 24 hour   Intake --   Output 1900 ml   Net -1900 ml         Significant Labs:  Lab Results   Component Value Date    GROUPTRH O POS 10/13/2022    HEPBSAG Non-Reactive 04/11/2022     Recent Labs   Lab 10/14/22  0434   HGB 9.9*   HCT 32.6*       I have personallly reviewed all pertinent lab results from the last 24 hours.  Recent Lab Results         10/14/22  0434   10/13/22  1312   10/13/22  1255        POC A-aDO2           Baso # 0.03           Basophil % 0.3           Differential Type Auto           Eos # 0.19           Eosinophil % 1.6           Hematocrit 32.6           Hemoglobin 9.9           Immature Grans (Abs) 0.06           Immature Granulocytes 0.5           Lymph # 3.12           Lymph % 26.9           MCH 26.5           MCHC 30.4           MCV 87.4           Mono # 0.75           Mono % 6.5           MPV 10.5           Neutrophils, Abs 7.44           Neutrophils Relative 64.2           nRBC 0.0           NUCLEATED RBC ABSOLUTE 0.00           Platelets 252           POC  Base Excess   -0.5   2.9       POC HCO3   24.2   30.6       POC PCO2   39   58       POC PH   7.40   7.33       POC PO2   47   29       POC SATURATED O2   84.5   42.0       RBC 3.73           RDW 15.7           WBC 11.59                   Physical Exam:   Constitutional: She is oriented to person, place, and time. She appears well-developed and well-nourished.    HENT:   Head: Normocephalic and atraumatic.    Eyes: Pupils are equal, round, and reactive to light. EOM are normal.     Cardiovascular:  Normal rate, regular rhythm and normal heart sounds.      Exam reveals no clubbing, no cyanosis and no edema.        Pulmonary/Chest: Effort normal and breath sounds normal.        Abdominal: Soft. Bowel sounds are normal. She exhibits abdominal incision. She exhibits no distension. There is abdominal tenderness.     Genitourinary:    Uterus normal.             Musculoskeletal: Normal range of motion and moves all extremeties.       Neurological: She is alert and oriented to person, place, and time.    Skin: Skin is warm and dry. No cyanosis. Nails show no clubbing.    Psychiatric: She has a normal mood and affect. Her behavior is normal. Judgment and thought content normal.

## 2022-10-15 VITALS
BODY MASS INDEX: 41.32 KG/M2 | SYSTOLIC BLOOD PRESSURE: 144 MMHG | HEART RATE: 76 BPM | RESPIRATION RATE: 18 BRPM | TEMPERATURE: 99 F | OXYGEN SATURATION: 97 % | WEIGHT: 279 LBS | DIASTOLIC BLOOD PRESSURE: 67 MMHG | HEIGHT: 69 IN

## 2022-10-15 LAB — UA COMPLETE W REFLEX CULTURE PNL UR: ABNORMAL

## 2022-10-15 PROCEDURE — 63600175 PHARM REV CODE 636 W HCPCS: Performed by: OBSTETRICS & GYNECOLOGY

## 2022-10-15 PROCEDURE — 90472 IMMUNIZATION ADMIN EACH ADD: CPT | Performed by: OBSTETRICS & GYNECOLOGY

## 2022-10-15 PROCEDURE — 90715 TDAP VACCINE 7 YRS/> IM: CPT | Performed by: OBSTETRICS & GYNECOLOGY

## 2022-10-15 PROCEDURE — 90471 IMMUNIZATION ADMIN: CPT | Performed by: OBSTETRICS & GYNECOLOGY

## 2022-10-15 PROCEDURE — 25000003 PHARM REV CODE 250: Performed by: OBSTETRICS & GYNECOLOGY

## 2022-10-15 PROCEDURE — 90686 IIV4 VACC NO PRSV 0.5 ML IM: CPT | Performed by: OBSTETRICS & GYNECOLOGY

## 2022-10-15 RX ORDER — HYDROCODONE BITARTRATE AND ACETAMINOPHEN 7.5; 325 MG/1; MG/1
1 TABLET ORAL EVERY 6 HOURS PRN
Qty: 28 TABLET | Refills: 0 | Status: SHIPPED | OUTPATIENT
Start: 2022-10-15 | End: 2023-03-06

## 2022-10-15 RX ORDER — FERROUS SULFATE 325(65) MG
325 TABLET, DELAYED RELEASE (ENTERIC COATED) ORAL
Qty: 90 TABLET | Refills: 1 | Status: SHIPPED | OUTPATIENT
Start: 2022-10-15 | End: 2023-04-04

## 2022-10-15 RX ADMIN — DOCUSATE SODIUM 200 MG: 100 CAPSULE, LIQUID FILLED ORAL at 09:10

## 2022-10-15 RX ADMIN — Medication 1 TABLET: at 09:10

## 2022-10-15 RX ADMIN — HYDROCODONE BITARTRATE AND ACETAMINOPHEN 1 TABLET: 7.5; 325 TABLET ORAL at 07:10

## 2022-10-15 RX ADMIN — INFLUENZA VIRUS VACCINE 0.5 ML: 15; 15; 15; 15 SUSPENSION INTRAMUSCULAR at 11:10

## 2022-10-15 RX ADMIN — TETANUS TOXOID, REDUCED DIPHTHERIA TOXOID AND ACELLULAR PERTUSSIS VACCINE, ADSORBED 0.5 ML: 5; 2.5; 8; 8; 2.5 SUSPENSION INTRAMUSCULAR at 11:10

## 2022-10-15 RX ADMIN — IBUPROFEN 800 MG: 800 TABLET ORAL at 07:10

## 2022-10-15 NOTE — DISCHARGE SUMMARY
[facility] -  Labor and Delivery  Obstetrics  Discharge Summary      Patient Name: Sidra Winter  MRN: [id number]  Admission Date: 10/13/2022  Hospital Length of Stay: 2 days  Discharge Date and Time:  10/15/2022 10:18 AM  Attending Physician: Mario Foster MD   Discharging Provider: Mario Foster MD   Primary Care Provider: Braxton Ornelas MD    HPI: This is a 37 y/o  admitted at 39 weeks for repeat  section. Prenatal care with Dr. Mckeon complicated by h/o  x 1.          Procedure(s) (LRB):   SECTION (N/A)     Hospital Course:   This is a 36-year-old  002 admitted at 39 weeks 0 days for repeat  section at term.  Prenatal care with Dr. Mckeon with no complications.  Patient delivered a live-born male baby weighing 8 lb 9 oz with Apgars of 9 and 9 via repeat Caesarean section.  The patient is both breast and bottle feeding.  Her hospital course was unremarkable by postoperative day 2. She was ready for discharge.  Disposition was to home.  Condition stable.           Final Active Diagnoses:    Diagnosis Date Noted POA    PRINCIPAL PROBLEM:  S/P  section [Z98.891] 10/07/2022 Not Applicable    Gestational hypertension w/o significant proteinuria in 3rd trimester [O13.3] 10/07/2022 Yes      Problems Resolved During this Admission:    Diagnosis Date Noted Date Resolved POA    39 weeks gestation of pregnancy [Z3A.39] 10/13/2022 10/14/2022 Not Applicable        Significant Diagnostic Studies: Labs:   CBC   Recent Labs   Lab 10/14/22  0434   WBC 11.59*   HGB 9.9*   HCT 32.6*            Feeding Method: both breast and bottle    Immunizations     Date Immunization Status Dose Route/Site Given by    10/13/22 1525 MMR Incomplete 0.5 mL Subcutaneous/     10/13/22 1525 Tdap Incomplete 0.5 mL Intramuscular/           Delivery:    Episiotomy:     Lacerations:     Repair suture:     Repair # of packets:     Blood loss (ml):       Birth  information:  YOB: 2020   Time of birth: 12:45 PM   Sex: male   Delivery type: , Low Transverse   Gestational Age: 39w0d    Delivery Clinician:      Other providers:       Additional  information:  Forceps:    Vacuum:    Breech:    Observed anomalies      Living?:           APGARS  One minute Five minutes Ten minutes   Skin color:         Heart rate:         Grimace:         Muscle tone:         Breathing:         Totals: 9  9        Placenta: Delivered:       appearance    Pending Diagnostic Studies:     Procedure Component Value Units Date/Time    EXTRA TUBES [[id number]] Collected: 10/13/22 0550    Order Status: Sent Lab Status: In process Updated: 10/13/22 0629    Specimen: Blood, Venous     Narrative:      The following orders were created for panel order EXTRA TUBES.  Procedure                               Abnormality         Status                     ---------                               -----------         ------                     Gold Top Hold[[id number]]                                    In process                   Please view results for these tests on the individual orders.          Discharged Condition: good    Disposition: Home or Self Care    Follow Up:   Follow-up Information     Mario Foster MD Follow up in 2 week(s).    Specialty: Obstetrics and Gynecology  Contact information:  [address] [place] [address]  [facility] [address]  [phone]                       Patient Instructions:      Diet Adult Regular     Pelvic Rest     Notify your health care provider if you experience any of the following:  temperature >100.4     Notify your health care provider if you experience any of the following:  persistent nausea and vomiting or diarrhea     Notify your health care provider if you experience any of the following:  severe uncontrolled pain     Notify your health care provider if you experience any of the following:  redness, tenderness, or signs of  infection (pain, swelling, redness, odor or green/yellow discharge around incision site)     Notify your health care provider if you experience any of the following:  difficulty breathing or increased cough     Notify your health care provider if you experience any of the following:  severe persistent headache     Notify your health care provider if you experience any of the following:  worsening rash     Notify your health care provider if you experience any of the following:  persistent dizziness, light-headedness, or visual disturbances     Notify your health care provider if you experience any of the following:  increased confusion or weakness     Medications:  Current Discharge Medication List      START taking these medications    Details   ferrous sulfate 325 (65 FE) MG EC tablet Take 1 tablet (325 mg total) by mouth 3 (three) times daily with meals.  Qty: 90 tablet, Refills: 1      HYDROcodone-acetaminophen (NORCO) 7.5-325 mg per tablet Take 1 tablet by mouth every 6 (six) hours as needed for Pain.  Qty: 28 tablet, Refills: 0    Comments: Quantity prescribed more than 7 day supply? Yes, quantity medically necessary         CONTINUE these medications which have NOT CHANGED    Details   calcium carbonate/vitamin D3 (CALCIUM+D ORAL) Take by mouth.      prenatal 25/iron fum/folic/dha (PRENATAL-1 ORAL) Take by mouth.             Mario Foster MD  Obstetrics  [facility] -  Labor and Delivery

## 2022-10-15 NOTE — HOSPITAL COURSE
This is a 36-year-old  002 admitted at 39 weeks 0 days for repeat  section at term.  Prenatal care with Dr. Mckeon with no complications.  Patient delivered a live-born male baby weighing 8 lb 9 oz with Apgars of 9 and 9 via repeat Caesarean section.  The patient is both breast and bottle feeding.  Her hospital course was unremarkable by postoperative day 2. She was ready for discharge.  Disposition was to home.  Condition stable.

## 2022-10-15 NOTE — PLAN OF CARE
Problem: Adult Inpatient Plan of Care  Goal: Plan of Care Review  Outcome: Ongoing, Progressing  Goal: Patient-Specific Goal (Individualized)  Outcome: Ongoing, Progressing  Goal: Absence of Hospital-Acquired Illness or Injury  Outcome: Ongoing, Progressing  Goal: Optimal Comfort and Wellbeing  Outcome: Ongoing, Progressing  Goal: Readiness for Transition of Care  Outcome: Ongoing, Progressing     Problem:  Fall Injury Risk  Goal: Absence of Fall, Infant Drop and Related Injury  Outcome: Ongoing, Progressing     Problem: Infection  Goal: Absence of Infection Signs and Symptoms  Outcome: Ongoing, Progressing     Problem: Bariatric Environmental Safety  Goal: Safety Maintained with Care  Outcome: Ongoing, Progressing     Problem: Adjustment to Role Transition (Postpartum  Delivery)  Goal: Successful Maternal Role Transition  Outcome: Ongoing, Progressing     Problem: Bleeding (Postpartum  Delivery)  Goal: Hemostasis  Outcome: Ongoing, Progressing     Problem: Infection (Postpartum  Delivery)  Goal: Absence of Infection Signs and Symptoms  Outcome: Ongoing, Progressing     Problem: Pain (Postpartum  Delivery)  Goal: Acceptable Pain Control  Outcome: Ongoing, Progressing     Problem: Postoperative Nausea and Vomiting (Postpartum  Delivery)  Goal: Nausea and Vomiting Relief  Outcome: Ongoing, Progressing     Problem: Postoperative Urinary Retention (Postpartum  Delivery)  Goal: Effective Urinary Elimination  Outcome: Ongoing, Progressing     Problem: Suicide Risk  Goal: Absence of Self-Harm  Outcome: Ongoing, Progressing

## 2022-10-15 NOTE — PLAN OF CARE
Problem: Adult Inpatient Plan of Care  Goal: Plan of Care Review  10/15/2022 1014 by Mimi Abbasi RN  Outcome: Met  10/15/2022 1013 by Mimi Abbasi RN  Outcome: Ongoing, Progressing  Goal: Patient-Specific Goal (Individualized)  10/15/2022 1014 by Mimi Abbasi RN  Outcome: Met  10/15/2022 1013 by Mimi Abbasi RN  Outcome: Ongoing, Progressing  Goal: Absence of Hospital-Acquired Illness or Injury  10/15/2022 1014 by Mimi Abbasi RN  Outcome: Met  10/15/2022 1013 by Mimi Abbasi RN  Outcome: Ongoing, Progressing  Goal: Optimal Comfort and Wellbeing  10/15/2022 1014 by Mimi Abbasi RN  Outcome: Met  10/15/2022 1013 by Mimi Abbasi RN  Outcome: Ongoing, Progressing  Goal: Readiness for Transition of Care  10/15/2022 1014 by Mimi Abbasi RN  Outcome: Met  10/15/2022 1013 by Mimi Abbasi RN  Outcome: Ongoing, Progressing     Problem:  Fall Injury Risk  Goal: Absence of Fall, Infant Drop and Related Injury  10/15/2022 1014 by Mimi Abbasi RN  Outcome: Met  10/15/2022 1013 by Mimi Abbasi RN  Outcome: Ongoing, Progressing     Problem: Infection  Goal: Absence of Infection Signs and Symptoms  10/15/2022 1014 by Mimi Abbasi RN  Outcome: Met  10/15/2022 1013 by Mimi Abbasi RN  Outcome: Ongoing, Progressing     Problem: Bariatric Environmental Safety  Goal: Safety Maintained with Care  10/15/2022 1014 by Mimi Abbasi RN  Outcome: Met  10/15/2022 1013 by Mimi Abbasi RN  Outcome: Ongoing, Progressing     Problem: Adjustment to Role Transition (Postpartum  Delivery)  Goal: Successful Maternal Role Transition  10/15/2022 1014 by Mimi Abbasi RN  Outcome: Met  10/15/2022 1013 by Mimi Abbasi RN  Outcome: Ongoing, Progressing     Problem: Bleeding (Postpartum  Delivery)  Goal: Hemostasis  10/15/2022 1014 by Mimi Abbasi RN  Outcome: Met  10/15/2022 1013 by Mimi M Elroy, RN  Outcome: Ongoing, Progressing     Problem: Infection  (Postpartum  Delivery)  Goal: Absence of Infection Signs and Symptoms  10/15/2022 1014 by Mimi Abbasi RN  Outcome: Met  10/15/2022 1013 by Mimi Abbasi RN  Outcome: Ongoing, Progressing     Problem: Pain (Postpartum  Delivery)  Goal: Acceptable Pain Control  10/15/2022 1014 by Mimi Abbasi RN  Outcome: Met  10/15/2022 1013 by Mimi Abbasi RN  Outcome: Ongoing, Progressing     Problem: Postoperative Nausea and Vomiting (Postpartum  Delivery)  Goal: Nausea and Vomiting Relief  10/15/2022 1014 by Mimi Abbasi RN  Outcome: Met  10/15/2022 1013 by Mimi Abbasi RN  Outcome: Ongoing, Progressing     Problem: Postoperative Urinary Retention (Postpartum  Delivery)  Goal: Effective Urinary Elimination  10/15/2022 1014 by Mimi Abbasi RN  Outcome: Met  10/15/2022 1013 by Mimi Abbasi RN  Outcome: Ongoing, Progressing     Problem: Suicide Risk  Goal: Absence of Self-Harm  10/15/2022 1014 by Mimi Abbasi RN  Outcome: Met  10/15/2022 1013 by Mimi Abbasi RN  Outcome: Ongoing, Progressing

## 2022-10-15 NOTE — PLAN OF CARE
Problem: Adult Inpatient Plan of Care  Goal: Plan of Care Review  10/14/2022 1946 by Kait Smith RN  Outcome: Ongoing, Progressing  10/14/2022 0714 by Kait Smith RN  Outcome: Ongoing, Progressing  Goal: Patient-Specific Goal (Individualized)  10/14/2022 1946 by Kait Smith RN  Outcome: Ongoing, Progressing  10/14/2022 0714 by Kait Smith RN  Outcome: Ongoing, Progressing  Goal: Absence of Hospital-Acquired Illness or Injury  10/14/2022 1946 by Kait Smith RN  Outcome: Ongoing, Progressing  10/14/2022 0714 by Kait Smith RN  Outcome: Ongoing, Progressing  Goal: Optimal Comfort and Wellbeing  10/14/2022 1946 by Kait Smith RN  Outcome: Ongoing, Progressing  10/14/2022 0714 by Kait Smith RN  Outcome: Ongoing, Progressing  Goal: Readiness for Transition of Care  10/14/2022 1946 by Kait Smith RN  Outcome: Ongoing, Progressing  10/14/2022 0714 by Kait Smith RN  Outcome: Ongoing, Progressing     Problem:  Fall Injury Risk  Goal: Absence of Fall, Infant Drop and Related Injury  10/14/2022 1946 by Kait Smtih RN  Outcome: Ongoing, Progressing  10/14/2022 0714 by Kait Smith RN  Outcome: Ongoing, Progressing     Problem: Bariatric Environmental Safety  Goal: Safety Maintained with Care  10/14/2022 1946 by Kait Smith RN  Outcome: Ongoing, Progressing  10/14/2022 0714 by Kait Smith RN  Outcome: Ongoing, Progressing     Problem: Infection  Goal: Absence of Infection Signs and Symptoms  10/14/2022 1946 by Kait Smith RN  Outcome: Ongoing, Progressing  10/14/2022 0714 by Kait Smith RN  Outcome: Ongoing, Progressing

## 2022-11-01 ENCOUNTER — OFFICE VISIT (OUTPATIENT)
Dept: OBSTETRICS AND GYNECOLOGY | Facility: CLINIC | Age: 37
End: 2022-11-01
Payer: MEDICAID

## 2022-11-01 VITALS
TEMPERATURE: 99 F | OXYGEN SATURATION: 99 % | SYSTOLIC BLOOD PRESSURE: 120 MMHG | BODY MASS INDEX: 36.43 KG/M2 | DIASTOLIC BLOOD PRESSURE: 85 MMHG | HEART RATE: 65 BPM | HEIGHT: 69 IN | WEIGHT: 246 LBS | RESPIRATION RATE: 18 BRPM

## 2022-11-01 DIAGNOSIS — Z98.891 S/P CESAREAN SECTION: ICD-10-CM

## 2022-11-01 PROCEDURE — 0503F POSTPARTUM CARE VISIT: CPT | Mod: ,,, | Performed by: ADVANCED PRACTICE MIDWIFE

## 2022-11-01 PROCEDURE — 3074F SYST BP LT 130 MM HG: CPT | Mod: ,,, | Performed by: ADVANCED PRACTICE MIDWIFE

## 2022-11-01 PROCEDURE — 3074F PR MOST RECENT SYSTOLIC BLOOD PRESSURE < 130 MM HG: ICD-10-PCS | Mod: ,,, | Performed by: ADVANCED PRACTICE MIDWIFE

## 2022-11-01 PROCEDURE — 0503F PR POSTPARTUM CARE VISIT: ICD-10-PCS | Mod: ,,, | Performed by: ADVANCED PRACTICE MIDWIFE

## 2022-11-01 NOTE — PROGRESS NOTES
"CC: Post-partum follow-up    Sidra Winter is a 36 y.o. female  who presents for post-partum visit.  She is S/P a , due to repeat  section .  She and the baby are doing well.  No pain.  No fever.   No bowel / bladder complaints.    Delivery Date: 2022  Delivery MD: Dr. Mckeon  Gender: male  Birth Weight: 8 pounds 9 ounces  Breast Feeding: YES  Depression: NO  Contraception: natural family planning    Pregnancy was complicated by:  Previous  section    /85   Pulse 65   Temp 98.8 °F (37.1 °C) (Oral)   Resp 18   Ht 5' 9" (1.753 m)   Wt 111.6 kg (246 lb)   LMP 2022 (Exact Date)   SpO2 99%   Breastfeeding Yes   BMI 36.33 kg/m²     ROS:  GENERAL: No fever, chills, fatigability.  VULVAR: No pain, no lesions and no itching.  VAGINAL: No relaxation, no itching, no discharge, no abnormal bleeding and no lesions.  ABDOMEN: No abdominal pain. Denies nausea. Denies vomiting. No diarrhea. No constipation  BREAST: Denies pain. No lumps. No discharge.  URINARY: No incontinence, no nocturia, no frequency and no dysuria.  CARDIOVASCULAR: No chest pain. No shortness of breath. No leg cramps.  NEUROLOGICAL: No headaches. No vision changes.    PHYSICAL EXAM:  ABDOMEN:  Soft, non-tender, non-distended. Incision healed  VULVA:  Normal, no lesions  UTERUS:  Normal size, shape, consistency, no mass or tenderness.    IMP:  2 Weeks Postpartum  Status Post , due to repeat  section  Encounter for postpartum visit    S/P  section      ICD-10-CM ICD-9-CM    1. Encounter for postpartum visit  Z39.2 V24.2       2. S/P  section  Z98.891 V45.89           PLAN:  May resume normal activities after 8 weeks postpartum  Birth control options and counseling discussed  Verbalized understanding to all information and instructions  Questions answered to desired level of satisfaction    Follow up in about 3 weeks (around 2022), or if symptoms worsen or " fail to improve, for postpartum visit/exam.

## 2022-11-03 ENCOUNTER — PATIENT MESSAGE (OUTPATIENT)
Dept: OBSTETRICS AND GYNECOLOGY | Facility: CLINIC | Age: 37
End: 2022-11-03
Payer: MEDICAID

## 2022-11-29 ENCOUNTER — OFFICE VISIT (OUTPATIENT)
Dept: OBSTETRICS AND GYNECOLOGY | Facility: CLINIC | Age: 37
End: 2022-11-29
Payer: MEDICAID

## 2022-11-29 VITALS
HEIGHT: 69 IN | WEIGHT: 252.13 LBS | DIASTOLIC BLOOD PRESSURE: 90 MMHG | RESPIRATION RATE: 18 BRPM | SYSTOLIC BLOOD PRESSURE: 133 MMHG | BODY MASS INDEX: 37.34 KG/M2 | OXYGEN SATURATION: 98 % | TEMPERATURE: 98 F | HEART RATE: 77 BPM

## 2022-11-29 DIAGNOSIS — F41.9 ANXIETY: ICD-10-CM

## 2022-11-29 PROCEDURE — 3008F BODY MASS INDEX DOCD: CPT | Mod: ,,, | Performed by: ADVANCED PRACTICE MIDWIFE

## 2022-11-29 PROCEDURE — 3008F PR BODY MASS INDEX (BMI) DOCUMENTED: ICD-10-PCS | Mod: ,,, | Performed by: ADVANCED PRACTICE MIDWIFE

## 2022-11-29 PROCEDURE — 0503F POSTPARTUM CARE VISIT: CPT | Mod: ,,, | Performed by: ADVANCED PRACTICE MIDWIFE

## 2022-11-29 PROCEDURE — 0503F PR POSTPARTUM CARE VISIT: ICD-10-PCS | Mod: ,,, | Performed by: ADVANCED PRACTICE MIDWIFE

## 2022-11-29 RX ORDER — SERTRALINE HYDROCHLORIDE 25 MG/1
25 TABLET, FILM COATED ORAL DAILY
Qty: 30 TABLET | Refills: 2 | Status: SHIPPED | OUTPATIENT
Start: 2022-11-29 | End: 2023-02-07 | Stop reason: SDUPTHER

## 2022-11-29 NOTE — PROGRESS NOTES
"CC: Post-partum follow-up    Sidra Winter is a 37 y.o. female  who presents for post-partum visit.  She is S/P a , due to repeat  section .  She and the baby are doing well.  No pain.  No fever.   No bowel / bladder complaints.      Delivery Date: 2022  Delivery MD: Dr. Mckeon  Gender: male  Birth Weight: 8 pounds 9 ounces  Breast Feeding: YES  Depression: NO  Contraception: natural family planning     Pregnancy was complicated by:  Previous  section     BP (!) 133/90 (BP Location: Left arm, Patient Position: Sitting)   Pulse 77   Temp 98.1 °F (36.7 °C) (Oral)   Resp 18   Ht 5' 9" (1.753 m)   Wt 114.4 kg (252 lb 2 oz)   LMP 2022 (Exact Date)   SpO2 98%   Breastfeeding Yes   BMI 37.23 kg/m²     ROS:  GENERAL: No fever, chills, fatigability.  VULVAR: No pain, no lesions and no itching.  VAGINAL: No relaxation, no itching, no discharge, no abnormal bleeding and no lesions.  ABDOMEN: No abdominal pain. Denies nausea. Denies vomiting. No diarrhea. No constipation  BREAST: Denies pain. No lumps. No discharge.  URINARY: No incontinence, no nocturia, no frequency and no dysuria.  CARDIOVASCULAR: No chest pain. No shortness of breath. No leg cramps.  NEUROLOGICAL: No headaches. No vision changes.    PHYSICAL EXAM:  ABDOMEN:  Soft, non-tender, non-distended. Incision healed  VULVA:  Normal, no lesions  UTERUS:  Normal size, shape, consistency, no mass or tenderness.      IMP:  6 Weeks Postpartum  Status Post , due to repeat  section  Encounter for postpartum visit    Anxiety  -     sertraline (ZOLOFT) 25 MG tablet; Take 1 tablet (25 mg total) by mouth once daily.  Dispense: 30 tablet; Refill: 2      ICD-10-CM ICD-9-CM    1. Encounter for postpartum visit  Z39.2 V24.2       2. Anxiety  F41.9 300.00 sertraline (ZOLOFT) 25 MG tablet          PLAN:  May resume normal activities   F/u with therapist, counselor for anxiety.  Birth control options and " counseling discussed  Verbalized understanding to all information and instructions  Questions answered to desired level of satisfaction    Follow up in about 1 month (around 12/29/2022), or if symptoms worsen or fail to improve, for f/u anxiety postpartum.

## 2023-01-10 ENCOUNTER — OFFICE VISIT (OUTPATIENT)
Dept: OBSTETRICS AND GYNECOLOGY | Facility: CLINIC | Age: 38
End: 2023-01-10
Payer: MEDICAID

## 2023-01-10 VITALS
DIASTOLIC BLOOD PRESSURE: 88 MMHG | HEIGHT: 69 IN | RESPIRATION RATE: 18 BRPM | TEMPERATURE: 98 F | OXYGEN SATURATION: 99 % | SYSTOLIC BLOOD PRESSURE: 134 MMHG | BODY MASS INDEX: 37.23 KG/M2 | HEART RATE: 70 BPM

## 2023-01-10 DIAGNOSIS — F41.9 ANXIETY: ICD-10-CM

## 2023-01-10 DIAGNOSIS — Z09 FOLLOW-UP EXAM: Primary | ICD-10-CM

## 2023-01-10 PROCEDURE — 3008F PR BODY MASS INDEX (BMI) DOCUMENTED: ICD-10-PCS | Mod: ,,, | Performed by: ADVANCED PRACTICE MIDWIFE

## 2023-01-10 PROCEDURE — 3008F BODY MASS INDEX DOCD: CPT | Mod: ,,, | Performed by: ADVANCED PRACTICE MIDWIFE

## 2023-01-10 PROCEDURE — 99213 PR OFFICE/OUTPT VISIT, EST, LEVL III, 20-29 MIN: ICD-10-PCS | Mod: ,,, | Performed by: ADVANCED PRACTICE MIDWIFE

## 2023-01-10 PROCEDURE — 99213 OFFICE O/P EST LOW 20 MIN: CPT | Mod: ,,, | Performed by: ADVANCED PRACTICE MIDWIFE

## 2023-01-10 NOTE — PROGRESS NOTES
Subjective:       Patient ID: Sidra Winter is a 37 y.o. female.    Chief Complaint: Follow-up (Medication)    Here for f/u breastfeeding and anxiety post zoloft use. States panic attacks have decreased since starting Zoloft. States breastfeeding is going well. She denies any other problems presently.     Review of Systems   All other systems reviewed and are negative.      Objective:      Physical Exam  Vitals reviewed.   Constitutional:       Appearance: Normal appearance.      Comments: Smiling during interview   Pulmonary:      Effort: Pulmonary effort is normal.   Skin:     General: Skin is warm and dry.      Capillary Refill: Capillary refill takes less than 2 seconds.   Neurological:      General: No focal deficit present.      Mental Status: She is alert and oriented to person, place, and time.   Psychiatric:         Mood and Affect: Mood normal.         Behavior: Behavior normal.         Thought Content: Thought content normal.         Judgment: Judgment normal.       Assessment:       Problem List Items Addressed This Visit    None  Visit Diagnoses       Follow-up exam    -  Primary    Anxiety                Plan:       Discussed f/u with therapist/psychiatric for anxiety and possible continuation of zoloft

## 2023-01-16 PROBLEM — O13.3 GESTATIONAL HYPERTENSION W/O SIGNIFICANT PROTEINURIA IN 3RD TRIMESTER: Status: RESOLVED | Noted: 2022-10-07 | Resolved: 2023-01-16

## 2023-01-19 ENCOUNTER — OFFICE VISIT (OUTPATIENT)
Dept: FAMILY MEDICINE | Facility: CLINIC | Age: 38
End: 2023-01-19
Payer: MEDICAID

## 2023-01-19 VITALS — TEMPERATURE: 98 F | HEIGHT: 69 IN | HEART RATE: 60 BPM | BODY MASS INDEX: 37.23 KG/M2 | OXYGEN SATURATION: 99 %

## 2023-01-19 DIAGNOSIS — R05.1 ACUTE COUGH: ICD-10-CM

## 2023-01-19 DIAGNOSIS — J01.00 ACUTE NON-RECURRENT MAXILLARY SINUSITIS: Primary | ICD-10-CM

## 2023-01-19 PROCEDURE — 99213 OFFICE O/P EST LOW 20 MIN: CPT | Mod: 25,,, | Performed by: FAMILY MEDICINE

## 2023-01-19 PROCEDURE — 99213 PR OFFICE/OUTPT VISIT, EST, LEVL III, 20-29 MIN: ICD-10-PCS | Mod: 25,,, | Performed by: FAMILY MEDICINE

## 2023-01-19 PROCEDURE — 3008F BODY MASS INDEX DOCD: CPT | Mod: ,,, | Performed by: FAMILY MEDICINE

## 2023-01-19 PROCEDURE — 96372 PR INJECTION,THERAP/PROPH/DIAG2ST, IM OR SUBCUT: ICD-10-PCS | Mod: ,,, | Performed by: FAMILY MEDICINE

## 2023-01-19 PROCEDURE — 3008F PR BODY MASS INDEX (BMI) DOCUMENTED: ICD-10-PCS | Mod: ,,, | Performed by: FAMILY MEDICINE

## 2023-01-19 PROCEDURE — 96372 THER/PROPH/DIAG INJ SC/IM: CPT | Mod: ,,, | Performed by: FAMILY MEDICINE

## 2023-01-19 RX ORDER — AZITHROMYCIN 250 MG/1
TABLET, FILM COATED ORAL
Qty: 6 TABLET | Refills: 0 | Status: SHIPPED | OUTPATIENT
Start: 2023-01-19 | End: 2023-03-06 | Stop reason: ALTCHOICE

## 2023-01-19 RX ORDER — DEXAMETHASONE SODIUM PHOSPHATE 4 MG/ML
4 INJECTION, SOLUTION INTRA-ARTICULAR; INTRALESIONAL; INTRAMUSCULAR; INTRAVENOUS; SOFT TISSUE
Status: COMPLETED | OUTPATIENT
Start: 2023-01-19 | End: 2023-01-19

## 2023-01-19 RX ORDER — METHYLPREDNISOLONE ACETATE 80 MG/ML
40 INJECTION, SUSPENSION INTRA-ARTICULAR; INTRALESIONAL; INTRAMUSCULAR; SOFT TISSUE
Status: COMPLETED | OUTPATIENT
Start: 2023-01-19 | End: 2023-01-19

## 2023-01-19 RX ORDER — CEFTRIAXONE 1 G/1
1 INJECTION, POWDER, FOR SOLUTION INTRAMUSCULAR; INTRAVENOUS
Status: COMPLETED | OUTPATIENT
Start: 2023-01-19 | End: 2023-01-19

## 2023-01-19 RX ADMIN — METHYLPREDNISOLONE ACETATE 40 MG: 80 INJECTION, SUSPENSION INTRA-ARTICULAR; INTRALESIONAL; INTRAMUSCULAR; SOFT TISSUE at 11:01

## 2023-01-19 RX ADMIN — CEFTRIAXONE 1 G: 1 INJECTION, POWDER, FOR SOLUTION INTRAMUSCULAR; INTRAVENOUS at 11:01

## 2023-01-19 RX ADMIN — DEXAMETHASONE SODIUM PHOSPHATE 4 MG: 4 INJECTION, SOLUTION INTRA-ARTICULAR; INTRALESIONAL; INTRAMUSCULAR; INTRAVENOUS; SOFT TISSUE at 11:01

## 2023-01-20 NOTE — PROGRESS NOTES
Braxton Ornelas MD   [facility]  [address] [place] [address]     PATIENT NAME: Sidra Winter  : 1985  DATE: 23  MRN: [id number]      Billing Provider: Braxton Ornelas MD  Level of Service: GA OFFICE/OUTPT VISIT, EST, LEVL III, 20-29 MIN  Patient PCP Information       Provider PCP Type    Braxton Ornelas MD General            Reason for Visit / Chief Complaint: Sinusitis (Sore throat, PN drainage, nasal drainage x 3 weeks  )         History of Present Illness / Problem Focused Workflow     Sidra Winter presents to the clinic with Sinusitis (Sore throat, PN drainage, nasal drainage x 3 weeks  )     HPI    Review of Systems     Review of Systems   Constitutional:  Negative for activity change, appetite change, fatigue and fever.   HENT:  Positive for nasal congestion, sinus pressure/congestion and sore throat. Negative for ear pain and hearing loss.    Respiratory:  Positive for cough. Negative for chest tightness and shortness of breath.    Cardiovascular:  Negative for chest pain and palpitations.   Gastrointestinal:  Negative for abdominal pain and fecal incontinence.   Genitourinary:  Negative for bladder incontinence and difficulty urinating.   Musculoskeletal:  Negative for arthralgias.   Integumentary:  Negative for rash.   Neurological:  Negative for dizziness and headaches.      Medical / Social / Family History     Past Medical History:   Diagnosis Date    Anxiety disorder, unspecified     History of COVID-19     Scoliosis        Past Surgical History:   Procedure Laterality Date     SECTION  2021     SECTION N/A 10/13/2022    Procedure:  SECTION;  Surgeon: Mario Foster MD;  Location: [facility] L&D;  Service: OB/GYN;  Laterality: N/A;       Social History  Sidra Winter  reports that she has quit smoking. She has never used smokeless tobacco. She reports that she does not currently use alcohol. She  reports that she does not currently use drugs.    Family History  Sidra Winter  family history includes Ankylosing spondylitis in her mother; Breast cancer in her maternal aunt and maternal grandfather; Diabetes in her maternal grandfather and paternal grandfather; Gestational diabetes in her brother; Irritable bowel syndrome in her mother; Osteoarthritis in her mother; Raynaud syndrome in her mother; Sjogren's syndrome in her mother.    Medications and Allergies     Medications  Outpatient Medications Marked as Taking for the 1/19/23 encounter (Office Visit) with Braxton Ornelas MD   Medication Sig Dispense Refill    calcium carbonate/vitamin D3 (CALCIUM+D ORAL) Take by mouth.      prenatal 25/iron fum/folic/dha (PRENATAL-1 ORAL) Take by mouth.      sertraline (ZOLOFT) 25 MG tablet Take 1 tablet (25 mg total) by mouth once daily. 30 tablet 2     Current Facility-Administered Medications for the 1/19/23 encounter (Office Visit) with Braxton Ornelas MD   Medication Dose Route Frequency Provider Last Rate Last Admin    [COMPLETED] cefTRIAXone injection 1 g  1 g Intramuscular 1 time in Clinic/HOD Braxton Ornelas MD   1 g at 01/19/23 1139    [COMPLETED] dexAMETHasone injection 4 mg  4 mg Intramuscular 1 time in Clinic/HOD Braxton Ornelas MD   4 mg at 01/19/23 1139    [COMPLETED] methylPREDNISolone acetate injection 40 mg  40 mg Intramuscular 1 time in Clinic/HOD Braxton Ornelas MD   40 mg at 01/19/23 1139       Allergies  Review of patient's allergies indicates:   Allergen Reactions    Adhesive Rash       Physical Examination     Vitals:    01/19/23 1105   Pulse: 60   Temp: 98 °F (36.7 °C)     Physical Exam  Constitutional:       Appearance: Normal appearance.   HENT:      Head: Normocephalic and atraumatic.      Right Ear: Tympanic membrane normal.      Left Ear: Tympanic membrane normal.      Nose: Congestion and rhinorrhea present.      Mouth/Throat:      Pharynx: Posterior  oropharyngeal erythema present.   Eyes:      Pupils: Pupils are equal, round, and reactive to light.   Cardiovascular:      Rate and Rhythm: Normal rate and regular rhythm.      Pulses: Normal pulses.      Heart sounds: Normal heart sounds.   Pulmonary:      Breath sounds: No wheezing or rhonchi.   Abdominal:      Palpations: Abdomen is soft.   Lymphadenopathy:      Cervical: Cervical adenopathy present.   Skin:     General: Skin is warm and dry.   Neurological:      Mental Status: She is alert.        Assessment and Plan (including Health Maintenance)   :    Plan:         Health Maintenance Due   Topic Date Due    Lipid Panel  Never done    COVID-19 Vaccine (1) Never done       Problem List Items Addressed This Visit    None  Visit Diagnoses       Acute non-recurrent maxillary sinusitis    -  Primary    Relevant Medications    dexAMETHasone injection 4 mg (Completed)    methylPREDNISolone acetate injection 40 mg (Completed)    cefTRIAXone injection 1 g (Completed)    azithromycin (Z-ROMERO) 250 MG tablet    Acute cough              Acute non-recurrent maxillary sinusitis  -     dexAMETHasone injection 4 mg  -     methylPREDNISolone acetate injection 40 mg  -     cefTRIAXone injection 1 g  -     azithromycin (Z-ROMERO) 250 MG tablet; Take 2 tablets by mouth on day 1; Take 1 tablet by mouth on days 2-5  Dispense: 6 tablet; Refill: 0    Acute cough       Health Maintenance Topics with due status: Not Due       Topic Last Completion Date    Cervical Cancer Screening 03/28/2022    Hemoglobin A1c (Diabetic Prevention Screening) 04/11/2022    TETANUS VACCINE 10/15/2022       Procedures     Future Appointments   Date Time Provider Department Center   4/4/2023  8:00 AM Mario Foster MD [facility] OBGYN [facility]        No follow-ups on file.       Signature:  Braxton Ornelas MD  [facility]  [address] [place] [address]  [phone] Phone  [phone] Fax    Date of encounter:  1/19/23

## 2023-02-07 DIAGNOSIS — F41.9 ANXIETY: ICD-10-CM

## 2023-02-07 RX ORDER — SERTRALINE HYDROCHLORIDE 25 MG/1
25 TABLET, FILM COATED ORAL DAILY
Qty: 90 TABLET | Refills: 0 | Status: SHIPPED | OUTPATIENT
Start: 2023-02-07 | End: 2024-03-26

## 2023-02-07 NOTE — TELEPHONE ENCOUNTER
----- Message from Iris Scanlon sent at 2/7/2023  3:10 PM CST -----  Regarding: refill  Contact: Lisandro ROCHA) called needing a new 90 day prescription for Zoloft 25 mg.

## 2023-03-06 ENCOUNTER — OFFICE VISIT (OUTPATIENT)
Dept: FAMILY MEDICINE | Facility: CLINIC | Age: 38
End: 2023-03-06
Payer: MEDICAID

## 2023-03-06 VITALS
DIASTOLIC BLOOD PRESSURE: 72 MMHG | OXYGEN SATURATION: 99 % | TEMPERATURE: 98 F | SYSTOLIC BLOOD PRESSURE: 114 MMHG | BODY MASS INDEX: 39.28 KG/M2 | HEART RATE: 77 BPM | WEIGHT: 265.19 LBS | HEIGHT: 69 IN

## 2023-03-06 DIAGNOSIS — J01.00 ACUTE NON-RECURRENT MAXILLARY SINUSITIS: Primary | ICD-10-CM

## 2023-03-06 PROCEDURE — 3074F SYST BP LT 130 MM HG: CPT | Mod: ,,, | Performed by: FAMILY MEDICINE

## 2023-03-06 PROCEDURE — 3074F PR MOST RECENT SYSTOLIC BLOOD PRESSURE < 130 MM HG: ICD-10-PCS | Mod: ,,, | Performed by: FAMILY MEDICINE

## 2023-03-06 PROCEDURE — 3008F BODY MASS INDEX DOCD: CPT | Mod: ,,, | Performed by: FAMILY MEDICINE

## 2023-03-06 PROCEDURE — 99213 OFFICE O/P EST LOW 20 MIN: CPT | Mod: 25,,, | Performed by: FAMILY MEDICINE

## 2023-03-06 PROCEDURE — 96372 PR INJECTION,THERAP/PROPH/DIAG2ST, IM OR SUBCUT: ICD-10-PCS | Mod: ,,, | Performed by: FAMILY MEDICINE

## 2023-03-06 PROCEDURE — 3078F DIAST BP <80 MM HG: CPT | Mod: ,,, | Performed by: FAMILY MEDICINE

## 2023-03-06 PROCEDURE — 99213 PR OFFICE/OUTPT VISIT, EST, LEVL III, 20-29 MIN: ICD-10-PCS | Mod: 25,,, | Performed by: FAMILY MEDICINE

## 2023-03-06 PROCEDURE — 3078F PR MOST RECENT DIASTOLIC BLOOD PRESSURE < 80 MM HG: ICD-10-PCS | Mod: ,,, | Performed by: FAMILY MEDICINE

## 2023-03-06 PROCEDURE — 3008F PR BODY MASS INDEX (BMI) DOCUMENTED: ICD-10-PCS | Mod: ,,, | Performed by: FAMILY MEDICINE

## 2023-03-06 PROCEDURE — 96372 THER/PROPH/DIAG INJ SC/IM: CPT | Mod: ,,, | Performed by: FAMILY MEDICINE

## 2023-03-06 RX ORDER — DEXAMETHASONE SODIUM PHOSPHATE 4 MG/ML
4 INJECTION, SOLUTION INTRA-ARTICULAR; INTRALESIONAL; INTRAMUSCULAR; INTRAVENOUS; SOFT TISSUE
Status: COMPLETED | OUTPATIENT
Start: 2023-03-06 | End: 2023-03-06

## 2023-03-06 RX ORDER — METHYLPREDNISOLONE ACETATE 80 MG/ML
40 INJECTION, SUSPENSION INTRA-ARTICULAR; INTRALESIONAL; INTRAMUSCULAR; SOFT TISSUE
Status: COMPLETED | OUTPATIENT
Start: 2023-03-06 | End: 2023-03-06

## 2023-03-06 RX ORDER — CEFTRIAXONE 1 G/1
1 INJECTION, POWDER, FOR SOLUTION INTRAMUSCULAR; INTRAVENOUS
Status: COMPLETED | OUTPATIENT
Start: 2023-03-06 | End: 2023-03-06

## 2023-03-06 RX ADMIN — DEXAMETHASONE SODIUM PHOSPHATE 4 MG: 4 INJECTION, SOLUTION INTRA-ARTICULAR; INTRALESIONAL; INTRAMUSCULAR; INTRAVENOUS; SOFT TISSUE at 09:03

## 2023-03-06 RX ADMIN — METHYLPREDNISOLONE ACETATE 40 MG: 80 INJECTION, SUSPENSION INTRA-ARTICULAR; INTRALESIONAL; INTRAMUSCULAR; SOFT TISSUE at 09:03

## 2023-03-06 RX ADMIN — CEFTRIAXONE 1 G: 1 INJECTION, POWDER, FOR SOLUTION INTRAMUSCULAR; INTRAVENOUS at 09:03

## 2023-03-06 NOTE — PROGRESS NOTES
Braxton Ornelas MD   [facility]  [address] [place] [address]     PATIENT NAME: Sidra Winter  : 1985  DATE: 3/6/23  MRN: [id number]      Billing Provider: Braxton Ornelas MD  Level of Service: ME OFFICE/OUTPT VISIT, EST, LEVL III, 20-29 MIN  Patient PCP Information       Provider PCP Type    Braxton Ornelas MD General            Reason for Visit / Chief Complaint: Sinusitis (PN drainage, sneezing, cough, sore throat that started Wednesday. )         History of Present Illness / Problem Focused Workflow     Sidra Winter presents to the clinic with Sinusitis (PN drainage, sneezing, cough, sore throat that started Wednesday. )     HPI    Review of Systems     Review of Systems   Constitutional:  Negative for activity change, appetite change, fatigue and fever.   HENT:  Positive for postnasal drip, rhinorrhea and sinus pressure/congestion. Negative for nasal congestion, ear pain, hearing loss and sore throat.    Respiratory:  Negative for cough, chest tightness and shortness of breath.    Cardiovascular:  Negative for chest pain and palpitations.   Gastrointestinal:  Negative for abdominal pain and fecal incontinence.   Genitourinary:  Negative for bladder incontinence and difficulty urinating.   Musculoskeletal:  Negative for arthralgias.   Integumentary:  Negative for rash.   Neurological:  Negative for dizziness and headaches.      Medical / Social / Family History     Past Medical History:   Diagnosis Date    Anxiety disorder, unspecified     History of COVID-19     Scoliosis        Past Surgical History:   Procedure Laterality Date     SECTION  2021     SECTION N/A 10/13/2022    Procedure:  SECTION;  Surgeon: Mario Foster MD;  Location: [facility] L&D;  Service: OB/GYN;  Laterality: N/A;       Social History  Sidra Winter  reports that she has quit smoking. She has never used smokeless tobacco. She reports  that she does not currently use alcohol. She reports that she does not currently use drugs.    Family History  Sidra Winter  family history includes Ankylosing spondylitis in her mother; Breast cancer in her maternal aunt and maternal grandfather; Diabetes in her maternal grandfather and paternal grandfather; Gestational diabetes in her brother; Irritable bowel syndrome in her mother; Osteoarthritis in her mother; Raynaud syndrome in her mother; Sjogren's syndrome in her mother.    Medications and Allergies     Medications  Outpatient Medications Marked as Taking for the 3/6/23 encounter (Office Visit) with Braxton Orneals MD   Medication Sig Dispense Refill    calcium carbonate/vitamin D3 (CALCIUM+D ORAL) Take by mouth.      prenatal 25/iron fum/folic/dha (PRENATAL-1 ORAL) Take by mouth.      sertraline (ZOLOFT) 25 MG tablet Take 1 tablet (25 mg total) by mouth once daily. 90 tablet 0     Current Facility-Administered Medications for the 3/6/23 encounter (Office Visit) with Braxton Ornelas MD   Medication Dose Route Frequency Provider Last Rate Last Admin    [COMPLETED] cefTRIAXone injection 1 g  1 g Intramuscular 1 time in Clinic/HOD Braxton Ornelas MD   1 g at 03/06/23 0907    [COMPLETED] dexAMETHasone injection 4 mg  4 mg Intramuscular 1 time in Clinic/HOD Braxton Ornelas MD   4 mg at 03/06/23 0907    [COMPLETED] methylPREDNISolone acetate injection 40 mg  40 mg Intramuscular 1 time in Clinic/HOD Braxton Ornelas MD   40 mg at 03/06/23 0907       Allergies  Review of patient's allergies indicates:   Allergen Reactions    Adhesive Rash       Physical Examination     Vitals:    03/06/23 0820   BP: 114/72   Pulse: 77   Temp: 97.9 °F (36.6 °C)     Physical Exam  Constitutional:       Appearance: Normal appearance.   HENT:      Head: Normocephalic and atraumatic.      Right Ear: Tympanic membrane normal.      Left Ear: Tympanic membrane normal.      Nose: Congestion and rhinorrhea  present.      Mouth/Throat:      Pharynx: Posterior oropharyngeal erythema present.   Eyes:      Pupils: Pupils are equal, round, and reactive to light.   Cardiovascular:      Rate and Rhythm: Normal rate and regular rhythm.      Pulses: Normal pulses.      Heart sounds: Normal heart sounds.   Pulmonary:      Breath sounds: No wheezing or rhonchi.   Abdominal:      Palpations: Abdomen is soft.   Lymphadenopathy:      Cervical: Cervical adenopathy present.   Skin:     General: Skin is warm and dry.   Neurological:      Mental Status: She is alert.        Assessment and Plan (including Health Maintenance)   :    Plan:         Health Maintenance Due   Topic Date Due    Lipid Panel  Never done    COVID-19 Vaccine (1) Never done       Problem List Items Addressed This Visit    None  Visit Diagnoses       Acute non-recurrent maxillary sinusitis    -  Primary    Relevant Medications    dexAMETHasone injection 4 mg (Completed)    methylPREDNISolone acetate injection 40 mg (Completed)    cefTRIAXone injection 1 g (Completed)          Acute non-recurrent maxillary sinusitis  -     dexAMETHasone injection 4 mg  -     methylPREDNISolone acetate injection 40 mg  -     cefTRIAXone injection 1 g       Health Maintenance Topics with due status: Not Due       Topic Last Completion Date    Cervical Cancer Screening 03/28/2022    Hemoglobin A1c (Diabetic Prevention Screening) 04/11/2022    TETANUS VACCINE 10/15/2022       Procedures     Future Appointments   Date Time Provider Department Center   4/4/2023  8:00 AM Mario Foster MD [facility] OBGYN [facility]        No follow-ups on file.       Signature:  Braxton Ornelas MD  [facility]  [address] [place] [address]  [phone] Phone  [phone] Fax    Date of encounter: 3/6/23

## 2023-03-22 ENCOUNTER — OFFICE VISIT (OUTPATIENT)
Dept: FAMILY MEDICINE | Facility: CLINIC | Age: 38
End: 2023-03-22
Payer: MEDICAID

## 2023-03-22 ENCOUNTER — APPOINTMENT (OUTPATIENT)
Dept: RADIOLOGY | Facility: CLINIC | Age: 38
End: 2023-03-22
Attending: FAMILY MEDICINE
Payer: MEDICAID

## 2023-03-22 VITALS
TEMPERATURE: 98 F | HEIGHT: 69 IN | SYSTOLIC BLOOD PRESSURE: 118 MMHG | WEIGHT: 266.19 LBS | BODY MASS INDEX: 39.43 KG/M2 | HEART RATE: 76 BPM | OXYGEN SATURATION: 98 % | DIASTOLIC BLOOD PRESSURE: 84 MMHG

## 2023-03-22 DIAGNOSIS — R05.9 COUGH, UNSPECIFIED TYPE: Primary | ICD-10-CM

## 2023-03-22 DIAGNOSIS — R05.9 COUGH, UNSPECIFIED TYPE: ICD-10-CM

## 2023-03-22 DIAGNOSIS — J01.00 ACUTE NON-RECURRENT MAXILLARY SINUSITIS: ICD-10-CM

## 2023-03-22 PROCEDURE — 96372 PR INJECTION,THERAP/PROPH/DIAG2ST, IM OR SUBCUT: ICD-10-PCS | Mod: ,,, | Performed by: FAMILY MEDICINE

## 2023-03-22 PROCEDURE — 99213 OFFICE O/P EST LOW 20 MIN: CPT | Mod: 25,,, | Performed by: FAMILY MEDICINE

## 2023-03-22 PROCEDURE — 3008F BODY MASS INDEX DOCD: CPT | Mod: ,,, | Performed by: FAMILY MEDICINE

## 2023-03-22 PROCEDURE — 3008F PR BODY MASS INDEX (BMI) DOCUMENTED: ICD-10-PCS | Mod: ,,, | Performed by: FAMILY MEDICINE

## 2023-03-22 PROCEDURE — 3074F SYST BP LT 130 MM HG: CPT | Mod: ,,, | Performed by: FAMILY MEDICINE

## 2023-03-22 PROCEDURE — 96372 THER/PROPH/DIAG INJ SC/IM: CPT | Mod: ,,, | Performed by: FAMILY MEDICINE

## 2023-03-22 PROCEDURE — 71046 X-RAY EXAM CHEST 2 VIEWS: CPT | Mod: 26,,, | Performed by: RADIOLOGY

## 2023-03-22 PROCEDURE — 71046 X-RAY EXAM CHEST 2 VIEWS: CPT | Mod: TC,RHCUB,FY | Performed by: FAMILY MEDICINE

## 2023-03-22 PROCEDURE — 71046 XR CHEST PA AND LATERAL: ICD-10-PCS | Mod: 26,,, | Performed by: RADIOLOGY

## 2023-03-22 PROCEDURE — 99213 PR OFFICE/OUTPT VISIT, EST, LEVL III, 20-29 MIN: ICD-10-PCS | Mod: 25,,, | Performed by: FAMILY MEDICINE

## 2023-03-22 PROCEDURE — 3074F PR MOST RECENT SYSTOLIC BLOOD PRESSURE < 130 MM HG: ICD-10-PCS | Mod: ,,, | Performed by: FAMILY MEDICINE

## 2023-03-22 RX ORDER — CEFTRIAXONE 1 G/1
1 INJECTION, POWDER, FOR SOLUTION INTRAMUSCULAR; INTRAVENOUS
Status: COMPLETED | OUTPATIENT
Start: 2023-03-22 | End: 2023-03-22

## 2023-03-22 RX ORDER — METHYLPREDNISOLONE ACETATE 80 MG/ML
40 INJECTION, SUSPENSION INTRA-ARTICULAR; INTRALESIONAL; INTRAMUSCULAR; SOFT TISSUE
Status: COMPLETED | OUTPATIENT
Start: 2023-03-22 | End: 2023-03-22

## 2023-03-22 RX ORDER — DEXAMETHASONE SODIUM PHOSPHATE 4 MG/ML
4 INJECTION, SOLUTION INTRA-ARTICULAR; INTRALESIONAL; INTRAMUSCULAR; INTRAVENOUS; SOFT TISSUE
Status: COMPLETED | OUTPATIENT
Start: 2023-03-22 | End: 2023-03-22

## 2023-03-22 RX ORDER — AZITHROMYCIN 250 MG/1
TABLET, FILM COATED ORAL
Qty: 6 TABLET | Refills: 0 | Status: SHIPPED | OUTPATIENT
Start: 2023-03-22 | End: 2023-04-04

## 2023-03-22 RX ADMIN — DEXAMETHASONE SODIUM PHOSPHATE 4 MG: 4 INJECTION, SOLUTION INTRA-ARTICULAR; INTRALESIONAL; INTRAMUSCULAR; INTRAVENOUS; SOFT TISSUE at 09:03

## 2023-03-22 RX ADMIN — METHYLPREDNISOLONE ACETATE 40 MG: 80 INJECTION, SUSPENSION INTRA-ARTICULAR; INTRALESIONAL; INTRAMUSCULAR; SOFT TISSUE at 09:03

## 2023-03-22 RX ADMIN — CEFTRIAXONE 1 G: 1 INJECTION, POWDER, FOR SOLUTION INTRAMUSCULAR; INTRAVENOUS at 09:03

## 2023-03-22 NOTE — PROGRESS NOTES
Braxton Ornelas MD   [facility]  [address] [place] [address]     PATIENT NAME: Sidra Winter  : 1985  DATE: 3/22/23  MRN: [id number]      Billing Provider: Braxton Ornelas MD  Level of Service: MO OFFICE/OUTPT VISIT, EST, LEVL III, 20-29 MIN  Patient PCP Information       Provider PCP Type    Braxton Ornelas MD General            Reason for Visit / Chief Complaint: Sinusitis (PN drainage, cough, runny nose, sneezing x1 week, states she feels she cant get a deep breath. Like there is an elephant on her chest x 3 days)         History of Present Illness / Problem Focused Workflow     Sidra Winter presents to the clinic with Sinusitis (PN drainage, cough, runny nose, sneezing x1 week, states she feels she cant get a deep breath. Like there is an elephant on her chest x 3 days)     HPI    Review of Systems     Review of Systems   Constitutional:  Negative for activity change, appetite change, fatigue and fever.   HENT:  Positive for nasal congestion, sinus pressure/congestion and sore throat. Negative for ear pain and hearing loss.    Respiratory:  Positive for cough. Negative for chest tightness and shortness of breath.    Cardiovascular:  Negative for chest pain and palpitations.   Gastrointestinal:  Negative for abdominal pain and fecal incontinence.   Genitourinary:  Negative for bladder incontinence and difficulty urinating.   Musculoskeletal:  Negative for arthralgias.   Integumentary:  Negative for rash.   Neurological:  Negative for dizziness and headaches.      Medical / Social / Family History     Past Medical History:   Diagnosis Date    Anxiety disorder, unspecified     History of COVID-19     Scoliosis        Past Surgical History:   Procedure Laterality Date     SECTION  2021     SECTION N/A 10/13/2022    Procedure:  SECTION;  Surgeon: Mario Foster MD;  Location: [facility] L&D;  Service: OB/GYN;   Laterality: N/A;       Social History  Sidra Winter  reports that she has quit smoking. She has never used smokeless tobacco. She reports that she does not currently use alcohol. She reports that she does not currently use drugs.    Family History  Sidra Winter  family history includes Ankylosing spondylitis in her mother; Breast cancer in her maternal aunt and maternal grandfather; Diabetes in her maternal grandfather and paternal grandfather; Gestational diabetes in her brother; Irritable bowel syndrome in her mother; Osteoarthritis in her mother; Raynaud syndrome in her mother; Sjogren's syndrome in her mother.    Medications and Allergies     Medications  Outpatient Medications Marked as Taking for the 3/22/23 encounter (Office Visit) with Braxton Ornelas MD   Medication Sig Dispense Refill    calcium carbonate/vitamin D3 (CALCIUM+D ORAL) Take by mouth.      prenatal 25/iron fum/folic/dha (PRENATAL-1 ORAL) Take by mouth.      sertraline (ZOLOFT) 25 MG tablet Take 1 tablet (25 mg total) by mouth once daily. 90 tablet 0     Current Facility-Administered Medications for the 3/22/23 encounter (Office Visit) with Braxton Ornelas MD   Medication Dose Route Frequency Provider Last Rate Last Admin    cefTRIAXone injection 1 g  1 g Intramuscular 1 time in Clinic/HOD Braxton Ornelas MD        dexAMETHasone injection 4 mg  4 mg Intramuscular 1 time in Clinic/HOD Braxton Ornelas MD        methylPREDNISolone acetate injection 40 mg  40 mg Intramuscular 1 time in Clinic/HOD Braxton Ornelas MD           Allergies  Review of patient's allergies indicates:   Allergen Reactions    Adhesive Rash       Physical Examination     Vitals:    03/22/23 0845   BP: 118/84   Pulse: 76   Temp: 98.4 °F (36.9 °C)     Physical Exam  Constitutional:       Appearance: Normal appearance.   HENT:      Head: Normocephalic and atraumatic.      Right Ear: Tympanic membrane normal.      Left Ear: Tympanic membrane  normal.      Nose: Congestion and rhinorrhea present.      Mouth/Throat:      Pharynx: Posterior oropharyngeal erythema present.   Eyes:      Pupils: Pupils are equal, round, and reactive to light.   Cardiovascular:      Rate and Rhythm: Normal rate and regular rhythm.      Pulses: Normal pulses.      Heart sounds: Normal heart sounds.   Pulmonary:      Breath sounds: No wheezing or rhonchi.   Abdominal:      Palpations: Abdomen is soft.   Lymphadenopathy:      Cervical: Cervical adenopathy present.   Skin:     General: Skin is warm and dry.   Neurological:      Mental Status: She is alert.        Assessment and Plan (including Health Maintenance)   :    Plan:         Health Maintenance Due   Topic Date Due    Lipid Panel  Never done    COVID-19 Vaccine (1) Never done       Problem List Items Addressed This Visit    None  Visit Diagnoses       Cough, unspecified type    -  Primary    Relevant Orders    X-Ray Chest PA And Lateral    Acute non-recurrent maxillary sinusitis        Relevant Medications    dexAMETHasone injection 4 mg (Start on 3/22/2023  9:15 AM)    methylPREDNISolone acetate injection 40 mg (Start on 3/22/2023  9:15 AM)    cefTRIAXone injection 1 g (Start on 3/22/2023  9:15 AM)    azithromycin (Z-ROMERO) 250 MG tablet          Cough, unspecified type  -     X-Ray Chest PA And Lateral; Future; Expected date: 03/22/2023    Acute non-recurrent maxillary sinusitis  -     dexAMETHasone injection 4 mg  -     methylPREDNISolone acetate injection 40 mg  -     cefTRIAXone injection 1 g  -     azithromycin (Z-ROMERO) 250 MG tablet; Take 2 tablets by mouth on day 1; Take 1 tablet by mouth on days 2-5  Dispense: 6 tablet; Refill: 0       Health Maintenance Topics with due status: Not Due       Topic Last Completion Date    Cervical Cancer Screening 03/28/2022    Hemoglobin A1c (Diabetic Prevention Screening) 04/11/2022    TETANUS VACCINE 10/15/2022       Procedures     Future Appointments   Date Time Provider Department  [place]   4/4/2023  8:00 AM Mario Foster MD [facility] OBGYN [facility]        No follow-ups on file.       Signature:  Braxton Ornelas MD  [facility]  [address] [place] [address]  [phone] Phone  [phone] Fax    Date of encounter: 3/22/23

## 2023-04-04 ENCOUNTER — OFFICE VISIT (OUTPATIENT)
Dept: OBSTETRICS AND GYNECOLOGY | Facility: CLINIC | Age: 38
End: 2023-04-04
Payer: MEDICAID

## 2023-04-04 VITALS
BODY MASS INDEX: 39.02 KG/M2 | DIASTOLIC BLOOD PRESSURE: 77 MMHG | WEIGHT: 264.19 LBS | HEART RATE: 60 BPM | SYSTOLIC BLOOD PRESSURE: 119 MMHG

## 2023-04-04 DIAGNOSIS — Z12.4 CERVICAL CANCER SCREENING: Primary | ICD-10-CM

## 2023-04-04 DIAGNOSIS — Z01.419 ROUTINE GYNECOLOGICAL EXAMINATION: ICD-10-CM

## 2023-04-04 PROCEDURE — 88142 CYTOPATH C/V THIN LAYER: CPT | Mod: TC,GCY | Performed by: OBSTETRICS & GYNECOLOGY

## 2023-04-04 PROCEDURE — 3074F PR MOST RECENT SYSTOLIC BLOOD PRESSURE < 130 MM HG: ICD-10-PCS | Mod: ,,, | Performed by: OBSTETRICS & GYNECOLOGY

## 2023-04-04 PROCEDURE — 87624 HPV HI-RISK TYP POOLED RSLT: CPT | Mod: ,,, | Performed by: CLINICAL MEDICAL LABORATORY

## 2023-04-04 PROCEDURE — 99214 PR OFFICE/OUTPT VISIT, EST, LEVL IV, 30-39 MIN: ICD-10-PCS | Mod: ,,, | Performed by: OBSTETRICS & GYNECOLOGY

## 2023-04-04 PROCEDURE — 3074F SYST BP LT 130 MM HG: CPT | Mod: ,,, | Performed by: OBSTETRICS & GYNECOLOGY

## 2023-04-04 PROCEDURE — 3078F PR MOST RECENT DIASTOLIC BLOOD PRESSURE < 80 MM HG: ICD-10-PCS | Mod: ,,, | Performed by: OBSTETRICS & GYNECOLOGY

## 2023-04-04 PROCEDURE — 3008F PR BODY MASS INDEX (BMI) DOCUMENTED: ICD-10-PCS | Mod: ,,, | Performed by: OBSTETRICS & GYNECOLOGY

## 2023-04-04 PROCEDURE — 3008F BODY MASS INDEX DOCD: CPT | Mod: ,,, | Performed by: OBSTETRICS & GYNECOLOGY

## 2023-04-04 PROCEDURE — 99214 OFFICE O/P EST MOD 30 MIN: CPT | Mod: ,,, | Performed by: OBSTETRICS & GYNECOLOGY

## 2023-04-04 PROCEDURE — 87624 HUMAN PAPILLOMAVIRUS (HPV): ICD-10-PCS | Mod: ,,, | Performed by: CLINICAL MEDICAL LABORATORY

## 2023-04-04 PROCEDURE — 3078F DIAST BP <80 MM HG: CPT | Mod: ,,, | Performed by: OBSTETRICS & GYNECOLOGY

## 2023-04-04 NOTE — PROGRESS NOTES
CC: Well woman exam    Sidra Winter is a 37 y.o. female  presents for well woman exam.    LMP: No LMP recorded (lmp unknown)..    Last mammogram: N/A  Last Colonoscopy: N/A    Denies any issues, problems, or complaints.     Past Medical History:   Diagnosis Date    Anxiety disorder, unspecified     History of COVID-19     Scoliosis      Past Surgical History:   Procedure Laterality Date     SECTION  2021     SECTION N/A 10/13/2022    Procedure:  SECTION;  Surgeon: Mario Foster MD;  Location: [facility] L&D;  Service: OB/GYN;  Laterality: N/A;     Social History     Socioeconomic History    Marital status: Single   Tobacco Use    Smoking status: Former     Passive exposure: Never    Smokeless tobacco: Never   Substance and Sexual Activity    Alcohol use: Not Currently     Comment: Socially    Drug use: Not Currently    Sexual activity: Yes     Partners: Male     Birth control/protection: None     Family History   Problem Relation Age of Onset    Diabetes Paternal Grandfather     Breast cancer Maternal Grandfather     Diabetes Maternal Grandfather     Ankylosing spondylitis Mother     Sjogren's syndrome Mother     Osteoarthritis Mother     Irritable bowel syndrome Mother     Raynaud syndrome Mother     Gestational diabetes Brother     Breast cancer Maternal Aunt      OB History          2    Para   2    Term   2            AB        Living   2         SAB        IAB        Ectopic        Multiple   0    Live Births   2                 /77   Pulse 60   Wt 119.8 kg (264 lb 3.2 oz)   LMP  (LMP Unknown)   Breastfeeding Yes   BMI 39.02 kg/m²       ROS:  GENERAL: Denies weight gain or weight loss. Feeling well overall.   SKIN: Denies rash or lesions.   HEAD: Denies head injury or headache.   NODES: Denies enlarged lymph nodes.   CHEST: Denies chest pain or shortness of breath.   CARDIOVASCULAR: Denies palpitations or left sided chest pain.   ABDOMEN: No  abdominal pain, constipation, diarrhea, nausea, vomiting or rectal bleeding.   URINARY: No frequency, dysuria, hematuria, or burning on urination.  REPRODUCTIVE: See HPI.   BREASTS: The patient performs breast self-examination and denies pain, lumps, or nipple discharge.   HEMATOLOGIC: No easy bruisability or excessive bleeding.   MUSCULOSKELETAL: Denies joint pain or swelling.   NEUROLOGIC: Denies syncope or weakness.   PSYCHIATRIC: Denies depression, anxiety or mood swings.    PHYSICAL EXAM:  APPEARANCE: Well nourished, well developed, in no acute distress.  AFFECT: WNL, alert and oriented x 3  SKIN: No acne or hirsutism  NECK: Neck symmetric without masses or thyromegaly  NODES: No inguinal, cervical, axillary, or femoral lymph node enlargement  CHEST: Good respiratory effect  ABDOMEN: Soft.  No tenderness or masses.  No hepatosplenomegaly.  No hernias.  BREASTS: Symmetrical, no skin changes or visible lesions.  No palpable masses, nipple discharge bilaterally.  PELVIC: Normal external genitalia without lesions.  Normal hair distribution.  Adequate perineal body, normal urethral meatus.  Vagina moist and well rugated without lesions or discharge.  Cervix pink, without lesions, discharge or tenderness.  No significant cystocele or rectocele.  Bimanual exam shows uterus to be normal size, regular, mobile and nontender.  Adnexa without masses or tenderness.    EXTREMITIES: No edema.    Cervical cancer screening  -     ThinPrep Pap Test; Future; Expected date: 04/04/2023    Routine gynecological examination  -     ThinPrep Pap Test; Future; Expected date: 04/04/2023            Patient was counseled today on A.C.S. Pap guidelines and recommendations for yearly pelvic exams, mammograms and monthly self breast exams; to see her PCP for other health maintenance.   Exercise regimen encouraged  Healthy food choices encouraged  Questions answered to desired level of satisfaction  Verbalized understanding to all information  and instructions    Follow up in about 1 year (around 4/4/2024) for Annual.      Mario Foster M.D., FCOG    OB/GYN

## 2023-04-13 LAB
HPV 16: NEGATIVE
HPV 18: NEGATIVE
HPV OTHER: NEGATIVE

## 2023-05-02 ENCOUNTER — OFFICE VISIT (OUTPATIENT)
Dept: FAMILY MEDICINE | Facility: CLINIC | Age: 38
End: 2023-05-02
Payer: MEDICAID

## 2023-05-02 ENCOUNTER — APPOINTMENT (OUTPATIENT)
Dept: RADIOLOGY | Facility: CLINIC | Age: 38
End: 2023-05-02
Attending: FAMILY MEDICINE
Payer: MEDICAID

## 2023-05-02 VITALS
OXYGEN SATURATION: 99 % | BODY MASS INDEX: 39.9 KG/M2 | WEIGHT: 269.38 LBS | TEMPERATURE: 98 F | HEART RATE: 76 BPM | DIASTOLIC BLOOD PRESSURE: 84 MMHG | HEIGHT: 69 IN | SYSTOLIC BLOOD PRESSURE: 118 MMHG

## 2023-05-02 DIAGNOSIS — G57.01 PIRIFORMIS SYNDROME OF RIGHT SIDE: ICD-10-CM

## 2023-05-02 DIAGNOSIS — M54.50 LUMBAR SPINE PAIN: Primary | ICD-10-CM

## 2023-05-02 DIAGNOSIS — M54.50 LUMBAR SPINE PAIN: ICD-10-CM

## 2023-05-02 DIAGNOSIS — M54.41 ACUTE RIGHT-SIDED LOW BACK PAIN WITH RIGHT-SIDED SCIATICA: ICD-10-CM

## 2023-05-02 PROCEDURE — 72100 XR LUMBAR SPINE AP AND LATERAL: ICD-10-PCS | Mod: 26,,, | Performed by: RADIOLOGY

## 2023-05-02 PROCEDURE — 3074F SYST BP LT 130 MM HG: CPT | Mod: ,,, | Performed by: FAMILY MEDICINE

## 2023-05-02 PROCEDURE — 99213 PR OFFICE/OUTPT VISIT, EST, LEVL III, 20-29 MIN: ICD-10-PCS | Mod: 25,,, | Performed by: FAMILY MEDICINE

## 2023-05-02 PROCEDURE — 72100 X-RAY EXAM L-S SPINE 2/3 VWS: CPT | Mod: TC,RHCUB,FY | Performed by: FAMILY MEDICINE

## 2023-05-02 PROCEDURE — 3074F PR MOST RECENT SYSTOLIC BLOOD PRESSURE < 130 MM HG: ICD-10-PCS | Mod: ,,, | Performed by: FAMILY MEDICINE

## 2023-05-02 PROCEDURE — 3008F BODY MASS INDEX DOCD: CPT | Mod: ,,, | Performed by: FAMILY MEDICINE

## 2023-05-02 PROCEDURE — 3008F PR BODY MASS INDEX (BMI) DOCUMENTED: ICD-10-PCS | Mod: ,,, | Performed by: FAMILY MEDICINE

## 2023-05-02 PROCEDURE — 96372 PR INJECTION,THERAP/PROPH/DIAG2ST, IM OR SUBCUT: ICD-10-PCS | Mod: ,,, | Performed by: FAMILY MEDICINE

## 2023-05-02 PROCEDURE — 99213 OFFICE O/P EST LOW 20 MIN: CPT | Mod: 25,,, | Performed by: FAMILY MEDICINE

## 2023-05-02 PROCEDURE — 96372 THER/PROPH/DIAG INJ SC/IM: CPT | Mod: ,,, | Performed by: FAMILY MEDICINE

## 2023-05-02 PROCEDURE — 72100 X-RAY EXAM L-S SPINE 2/3 VWS: CPT | Mod: 26,,, | Performed by: RADIOLOGY

## 2023-05-02 RX ORDER — METHYLPREDNISOLONE ACETATE 80 MG/ML
40 INJECTION, SUSPENSION INTRA-ARTICULAR; INTRALESIONAL; INTRAMUSCULAR; SOFT TISSUE
Status: COMPLETED | OUTPATIENT
Start: 2023-05-02 | End: 2023-05-02

## 2023-05-02 RX ORDER — KETOROLAC TROMETHAMINE 30 MG/ML
60 INJECTION, SOLUTION INTRAMUSCULAR; INTRAVENOUS
Status: COMPLETED | OUTPATIENT
Start: 2023-05-02 | End: 2023-05-02

## 2023-05-02 RX ORDER — CYCLOBENZAPRINE HCL 10 MG
10 TABLET ORAL 3 TIMES DAILY PRN
Qty: 30 TABLET | Refills: 0 | Status: SHIPPED | OUTPATIENT
Start: 2023-05-02 | End: 2023-05-12

## 2023-05-02 RX ORDER — DEXAMETHASONE SODIUM PHOSPHATE 4 MG/ML
4 INJECTION, SOLUTION INTRA-ARTICULAR; INTRALESIONAL; INTRAMUSCULAR; INTRAVENOUS; SOFT TISSUE
Status: COMPLETED | OUTPATIENT
Start: 2023-05-02 | End: 2023-05-02

## 2023-05-02 RX ADMIN — DEXAMETHASONE SODIUM PHOSPHATE 4 MG: 4 INJECTION, SOLUTION INTRA-ARTICULAR; INTRALESIONAL; INTRAMUSCULAR; INTRAVENOUS; SOFT TISSUE at 08:05

## 2023-05-02 RX ADMIN — METHYLPREDNISOLONE ACETATE 40 MG: 80 INJECTION, SUSPENSION INTRA-ARTICULAR; INTRALESIONAL; INTRAMUSCULAR; SOFT TISSUE at 08:05

## 2023-05-02 RX ADMIN — KETOROLAC TROMETHAMINE 60 MG: 30 INJECTION, SOLUTION INTRAMUSCULAR; INTRAVENOUS at 08:05

## 2023-05-02 NOTE — PROGRESS NOTES
Braxton Ornelas MD   [facility]  [address] [place] [address]     PATIENT NAME: Sidra Winter  : 1985  DATE: 23  MRN: [id number]      Billing Provider: Braxton Ornelas MD  Level of Service:   Patient PCP Information       Provider PCP Type    Braxton Ornelas MD General            Reason for Visit / Chief Complaint: Back Pain (C/o pain to the center of her lower back x 4 days. Denies any injury. States she woke up one morning with it hurting. States pain radiates to her left shoulder and around her abdomen now. History of scoliosis.)         History of Present Illness / Problem Focused Workflow     Sidra Winter presents to the clinic with Back Pain (C/o pain to the center of her lower back x 4 days. Denies any injury. States she woke up one morning with it hurting. States pain radiates to her left shoulder and around her abdomen now. History of scoliosis.)     HPI    Review of Systems     Review of Systems   Constitutional:  Negative for activity change, appetite change, fatigue and fever.   HENT:  Negative for nasal congestion, ear pain, hearing loss, sinus pressure/congestion and sore throat.    Respiratory:  Negative for cough, chest tightness and shortness of breath.    Cardiovascular:  Negative for chest pain and palpitations.   Gastrointestinal:  Negative for abdominal pain and fecal incontinence.   Genitourinary:  Negative for bladder incontinence and difficulty urinating.   Musculoskeletal:  Positive for back pain and neck pain. Negative for arthralgias.   Integumentary:  Negative for rash.   Neurological:  Negative for dizziness and headaches.      Medical / Social / Family History     Past Medical History:   Diagnosis Date    Anxiety disorder, unspecified     History of COVID-19     Scoliosis        Past Surgical History:   Procedure Laterality Date     SECTION  2021     SECTION N/A 10/13/2022    Procedure:   SECTION;  Surgeon: Mario Foster MD;  Location: [facility] L&D;  Service: OB/GYN;  Laterality: N/A;       Social History  Sidra Winter  reports that she has quit smoking. She has never been exposed to tobacco smoke. She has never used smokeless tobacco. She reports that she does not currently use alcohol. She reports that she does not currently use drugs.    Family History  Sidra Winter  family history includes Ankylosing spondylitis in her mother; Breast cancer in her maternal aunt and maternal grandfather; Diabetes in her maternal grandfather and paternal grandfather; Gestational diabetes in her brother; Irritable bowel syndrome in her mother; Osteoarthritis in her mother; Raynaud syndrome in her mother; Sjogren's syndrome in her mother.    Medications and Allergies     Medications  Outpatient Medications Marked as Taking for the 5/2/23 encounter (Office Visit) with Braxton Ornelas MD   Medication Sig Dispense Refill    calcium carbonate/vitamin D3 (CALCIUM+D ORAL) Take by mouth.      prenatal 25/iron fum/folic/dha (PRENATAL-1 ORAL) Take by mouth.      sertraline (ZOLOFT) 25 MG tablet Take 1 tablet (25 mg total) by mouth once daily. 90 tablet 0       Allergies  Review of patient's allergies indicates:   Allergen Reactions    Adhesive Rash       Physical Examination     Vitals:    05/02/23 0731   BP: 118/84   Pulse: 76   Temp: 98.1 °F (36.7 °C)     Physical Exam  Constitutional:       General: She is not in acute distress.     Appearance: She is not ill-appearing.   HENT:      Head: Normocephalic and atraumatic.      Right Ear: Tympanic membrane and ear canal normal.      Left Ear: Tympanic membrane and ear canal normal.      Nose: Nose normal. No congestion or rhinorrhea.   Eyes:      Pupils: Pupils are equal, round, and reactive to light.   Cardiovascular:      Rate and Rhythm: Normal rate and regular rhythm.      Pulses: Normal pulses.      Heart sounds: No murmur heard.  Pulmonary:       Effort: No respiratory distress.      Breath sounds: No wheezing, rhonchi or rales.   Abdominal:      General: Bowel sounds are normal.      Palpations: Abdomen is soft.      Tenderness: There is no abdominal tenderness.      Hernia: No hernia is present.   Musculoskeletal:         General: Tenderness present.      Cervical back: Normal range of motion and neck supple.      Lumbar back: Spasms and tenderness present. Decreased range of motion.        Back:    Lymphadenopathy:      Cervical: No cervical adenopathy.   Skin:     General: Skin is warm and dry.   Neurological:      Mental Status: She is alert.   Psychiatric:         Behavior: Behavior normal.         Thought Content: Thought content normal.        Assessment and Plan (including Health Maintenance)   :    Plan:         Health Maintenance Due   Topic Date Due    Lipid Panel  Never done    COVID-19 Vaccine (1) Never done       Problem List Items Addressed This Visit    None  Visit Diagnoses       Lumbar spine pain    -  Primary    Relevant Orders    X-Ray Lumbar Spine AP And Lateral          Lumbar spine pain  -     X-Ray Lumbar Spine AP And Lateral; Future; Expected date: 05/02/2023       Health Maintenance Topics with due status: Not Due       Topic Last Completion Date    Hemoglobin A1c (Diabetic Prevention Screening) 04/11/2022    TETANUS VACCINE 10/15/2022    Cervical Cancer Screening 04/04/2023       Procedures     Future Appointments   Date Time Provider Department Center   4/9/2024  8:00 AM Mario Foster MD [facility]        No follow-ups on file.       Signature:  Braxton Ornelas MD  [facility]  [address] [place] [address]  [phone] Phone  [phone] Fax    Date of encounter: 5/2/23

## 2023-09-18 PROBLEM — Z3A.38 38 WEEKS GESTATION OF PREGNANCY: Status: RESOLVED | Noted: 2022-10-07 | Resolved: 2023-09-18

## 2023-10-19 ENCOUNTER — OFFICE VISIT (OUTPATIENT)
Dept: FAMILY MEDICINE | Facility: CLINIC | Age: 38
End: 2023-10-19
Payer: MEDICAID

## 2023-10-19 ENCOUNTER — APPOINTMENT (OUTPATIENT)
Dept: RADIOLOGY | Facility: CLINIC | Age: 38
End: 2023-10-19
Attending: FAMILY MEDICINE
Payer: MEDICAID

## 2023-10-19 VITALS
OXYGEN SATURATION: 99 % | WEIGHT: 264.63 LBS | DIASTOLIC BLOOD PRESSURE: 84 MMHG | HEIGHT: 69 IN | HEART RATE: 79 BPM | TEMPERATURE: 98 F | SYSTOLIC BLOOD PRESSURE: 112 MMHG | BODY MASS INDEX: 39.19 KG/M2

## 2023-10-19 DIAGNOSIS — M41.9 SCOLIOSIS OF THORACOLUMBAR SPINE, UNSPECIFIED SCOLIOSIS TYPE: ICD-10-CM

## 2023-10-19 DIAGNOSIS — M54.50 LOW BACK PAIN, UNSPECIFIED BACK PAIN LATERALITY, UNSPECIFIED CHRONICITY, UNSPECIFIED WHETHER SCIATICA PRESENT: ICD-10-CM

## 2023-10-19 DIAGNOSIS — M25.50 ARTHRALGIA, UNSPECIFIED JOINT: Primary | ICD-10-CM

## 2023-10-19 DIAGNOSIS — M41.86 LEVOSCOLIOSIS OF LUMBAR SPINE: ICD-10-CM

## 2023-10-19 DIAGNOSIS — R13.10 DYSPHAGIA, UNSPECIFIED TYPE: ICD-10-CM

## 2023-10-19 LAB
ANION GAP SERPL CALCULATED.3IONS-SCNC: 10 MMOL/L (ref 7–16)
BASOPHILS # BLD AUTO: 0.06 K/UL (ref 0–0.2)
BASOPHILS NFR BLD AUTO: 0.6 % (ref 0–1)
BUN SERPL-MCNC: 13 MG/DL (ref 7–18)
BUN/CREAT SERPL: 16 (ref 6–20)
CALCIUM SERPL-MCNC: 9.8 MG/DL (ref 8.5–10.1)
CHLORIDE SERPL-SCNC: 104 MMOL/L (ref 98–107)
CO2 SERPL-SCNC: 30 MMOL/L (ref 21–32)
CREAT SERPL-MCNC: 0.83 MG/DL (ref 0.55–1.02)
CRP SERPL-MCNC: 4.14 MG/DL (ref 0–0.8)
DIFFERENTIAL METHOD BLD: ABNORMAL
EGFR (NO RACE VARIABLE) (RUSH/TITUS): 94 ML/MIN/1.73M2
EOSINOPHIL # BLD AUTO: 0.37 K/UL (ref 0–0.5)
EOSINOPHIL NFR BLD AUTO: 3.5 % (ref 1–4)
ERYTHROCYTE [DISTWIDTH] IN BLOOD BY AUTOMATED COUNT: 15.3 % (ref 11.5–14.5)
ERYTHROCYTE [SEDIMENTATION RATE] IN BLOOD BY WESTERGREN METHOD: 34 MM/HR (ref 0–20)
GLUCOSE SERPL-MCNC: 127 MG/DL (ref 74–106)
HCT VFR BLD AUTO: 40 % (ref 38–47)
HGB BLD-MCNC: 12.4 G/DL (ref 12–16)
IMM GRANULOCYTES # BLD AUTO: 0.04 K/UL (ref 0–0.04)
IMM GRANULOCYTES NFR BLD: 0.4 % (ref 0–0.4)
LYMPHOCYTES # BLD AUTO: 4.18 K/UL (ref 1–4.8)
LYMPHOCYTES NFR BLD AUTO: 39.8 % (ref 27–41)
MCH RBC QN AUTO: 25.6 PG (ref 27–31)
MCHC RBC AUTO-ENTMCNC: 31 G/DL (ref 32–36)
MCV RBC AUTO: 82.6 FL (ref 80–96)
MONOCYTES # BLD AUTO: 0.58 K/UL (ref 0–0.8)
MONOCYTES NFR BLD AUTO: 5.5 % (ref 2–6)
MPC BLD CALC-MCNC: 9.8 FL (ref 9.4–12.4)
NEUTROPHILS # BLD AUTO: 5.26 K/UL (ref 1.8–7.7)
NEUTROPHILS NFR BLD AUTO: 50.2 % (ref 53–65)
NRBC # BLD AUTO: 0 X10E3/UL
NRBC, AUTO (.00): 0 %
PLATELET # BLD AUTO: 491 K/UL (ref 150–400)
POTASSIUM SERPL-SCNC: 3.9 MMOL/L (ref 3.5–5.1)
RBC # BLD AUTO: 4.84 M/UL (ref 4.2–5.4)
RHEUMATOID FACT SER NEPH-ACNC: NEGATIVE [IU]/ML
SODIUM SERPL-SCNC: 140 MMOL/L (ref 136–145)
URATE SERPL-MCNC: 5.6 MG/DL (ref 2.6–6)
WBC # BLD AUTO: 10.49 K/UL (ref 4.5–11)

## 2023-10-19 PROCEDURE — 84550 ASSAY OF BLOOD/URIC ACID: CPT | Mod: ,,, | Performed by: CLINICAL MEDICAL LABORATORY

## 2023-10-19 PROCEDURE — 72082 X-RAY EXAM ENTIRE SPI 2/3 VW: CPT | Mod: TC,RHCUB,FY | Performed by: FAMILY MEDICINE

## 2023-10-19 PROCEDURE — 86430 RHEUMATOID FACTOR SCREEN: ICD-10-PCS | Mod: ,,, | Performed by: CLINICAL MEDICAL LABORATORY

## 2023-10-19 PROCEDURE — 96372 THER/PROPH/DIAG INJ SC/IM: CPT | Mod: ,,, | Performed by: FAMILY MEDICINE

## 2023-10-19 PROCEDURE — 72082 XR SCOLIOSIS COMPLETE: ICD-10-PCS | Mod: 26,,, | Performed by: RADIOLOGY

## 2023-10-19 PROCEDURE — 3074F PR MOST RECENT SYSTOLIC BLOOD PRESSURE < 130 MM HG: ICD-10-PCS | Mod: ,,, | Performed by: FAMILY MEDICINE

## 2023-10-19 PROCEDURE — 3008F BODY MASS INDEX DOCD: CPT | Mod: ,,, | Performed by: FAMILY MEDICINE

## 2023-10-19 PROCEDURE — 3008F PR BODY MASS INDEX (BMI) DOCUMENTED: ICD-10-PCS | Mod: ,,, | Performed by: FAMILY MEDICINE

## 2023-10-19 PROCEDURE — 85025 CBC WITH DIFFERENTIAL: ICD-10-PCS | Mod: ,,, | Performed by: CLINICAL MEDICAL LABORATORY

## 2023-10-19 PROCEDURE — 3074F SYST BP LT 130 MM HG: CPT | Mod: ,,, | Performed by: FAMILY MEDICINE

## 2023-10-19 PROCEDURE — 85025 COMPLETE CBC W/AUTO DIFF WBC: CPT | Mod: ,,, | Performed by: CLINICAL MEDICAL LABORATORY

## 2023-10-19 PROCEDURE — 96372 PR INJECTION,THERAP/PROPH/DIAG2ST, IM OR SUBCUT: ICD-10-PCS | Mod: ,,, | Performed by: FAMILY MEDICINE

## 2023-10-19 PROCEDURE — 86038 ANTINUCLEAR ANTIBODIES: CPT | Mod: ,,, | Performed by: CLINICAL MEDICAL LABORATORY

## 2023-10-19 PROCEDURE — 84550 URIC ACID: ICD-10-PCS | Mod: ,,, | Performed by: CLINICAL MEDICAL LABORATORY

## 2023-10-19 PROCEDURE — 86038 ANA EIA W/REFLEX DSDNA/ENA: ICD-10-PCS | Mod: ,,, | Performed by: CLINICAL MEDICAL LABORATORY

## 2023-10-19 PROCEDURE — 80048 BASIC METABOLIC PNL TOTAL CA: CPT | Mod: ,,, | Performed by: CLINICAL MEDICAL LABORATORY

## 2023-10-19 PROCEDURE — 85651 RBC SED RATE NONAUTOMATED: CPT | Mod: ,,, | Performed by: CLINICAL MEDICAL LABORATORY

## 2023-10-19 PROCEDURE — 99214 OFFICE O/P EST MOD 30 MIN: CPT | Mod: 25,,, | Performed by: FAMILY MEDICINE

## 2023-10-19 PROCEDURE — 99214 PR OFFICE/OUTPT VISIT, EST, LEVL IV, 30-39 MIN: ICD-10-PCS | Mod: 25,,, | Performed by: FAMILY MEDICINE

## 2023-10-19 PROCEDURE — 86430 RHEUMATOID FACTOR TEST QUAL: CPT | Mod: ,,, | Performed by: CLINICAL MEDICAL LABORATORY

## 2023-10-19 PROCEDURE — 85651 SEDIMENTATION RATE, AUTOMATED: ICD-10-PCS | Mod: ,,, | Performed by: CLINICAL MEDICAL LABORATORY

## 2023-10-19 PROCEDURE — 86140 C-REACTIVE PROTEIN: ICD-10-PCS | Mod: ,,, | Performed by: CLINICAL MEDICAL LABORATORY

## 2023-10-19 PROCEDURE — 86140 C-REACTIVE PROTEIN: CPT | Mod: ,,, | Performed by: CLINICAL MEDICAL LABORATORY

## 2023-10-19 PROCEDURE — 80048 BASIC METABOLIC PANEL: ICD-10-PCS | Mod: ,,, | Performed by: CLINICAL MEDICAL LABORATORY

## 2023-10-19 PROCEDURE — 72082 X-RAY EXAM ENTIRE SPI 2/3 VW: CPT | Mod: 26,,, | Performed by: RADIOLOGY

## 2023-10-19 RX ORDER — KETOROLAC TROMETHAMINE 30 MG/ML
60 INJECTION, SOLUTION INTRAMUSCULAR; INTRAVENOUS
Status: COMPLETED | OUTPATIENT
Start: 2023-10-19 | End: 2023-10-19

## 2023-10-19 RX ORDER — MELOXICAM 15 MG/1
15 TABLET ORAL DAILY
Qty: 30 TABLET | Refills: 5 | Status: SHIPPED | OUTPATIENT
Start: 2023-10-19 | End: 2024-03-09

## 2023-10-19 RX ADMIN — KETOROLAC TROMETHAMINE 60 MG: 30 INJECTION, SOLUTION INTRAMUSCULAR; INTRAVENOUS at 02:10

## 2023-10-19 NOTE — PROGRESS NOTES
Braxton Ornelas MD   [facility]  [address] [place] [address]     PATIENT NAME: Sidra Winter  : 1985  DATE: 10/19/23  MRN: [id number]      Billing Provider: Braxton Ornelas MD  Level of Service:   Patient PCP Information       Provider PCP Type    Braxton Ornelas MD General            Reason for Visit / Chief Complaint: Back Pain (Low back pain that seems to be progressively getting worse in the past few months. Stretches multiple times daily in the past few days, but unable to tell any difference. Patient concerned of possible arthritis. Last lumbar x-ray 2023), Knee Pain (Bilateral knee pain that increase at times with long periods of standing. ), and Dysphagia (Symptoms started at around 18 yr, but has progressively worsened. Every time to eat meat, it gets stuck in throat. Lately, multiple foods seems to get stuck with swallowing. )         History of Present Illness / Problem Focused Workflow     Sidra Winter presents to the clinic with Back Pain (Low back pain that seems to be progressively getting worse in the past few months. Stretches multiple times daily in the past few days, but unable to tell any difference. Patient concerned of possible arthritis. Last lumbar x-ray 2023), Knee Pain (Bilateral knee pain that increase at times with long periods of standing. ), and Dysphagia (Symptoms started at around 18 yr, but has progressively worsened. Every time to eat meat, it gets stuck in throat. Lately, multiple foods seems to get stuck with swallowing. )     HPI    Review of Systems     Review of Systems   Constitutional:  Negative for activity change, appetite change, fatigue and fever.   HENT:  Negative for nasal congestion, ear pain, hearing loss, sinus pressure/congestion and sore throat.    Respiratory:  Negative for cough, chest tightness and shortness of breath.    Cardiovascular:  Negative for chest pain and palpitations.    Gastrointestinal:  Negative for abdominal pain and fecal incontinence.   Genitourinary:  Negative for bladder incontinence and difficulty urinating.   Musculoskeletal:  Positive for back pain and gait problem. Negative for arthralgias.   Integumentary:  Negative for rash.   Neurological:  Negative for dizziness and headaches.        Medical / Social / Family History     Past Medical History:   Diagnosis Date    Anxiety disorder, unspecified     History of COVID-19     Scoliosis        Past Surgical History:   Procedure Laterality Date     SECTION  2021     SECTION N/A 10/13/2022    Procedure:  SECTION;  Surgeon: Mario Foster MD;  Location: [facility] L&D;  Service: OB/GYN;  Laterality: N/A;       Social History  Sidra Winter  reports that she has quit smoking. She has never been exposed to tobacco smoke. She has never used smokeless tobacco. She reports that she does not currently use alcohol. She reports that she does not currently use drugs.    Family History  Sidra Winter  family history includes Ankylosing spondylitis in her mother; Breast cancer in her maternal aunt and maternal grandfather; Diabetes in her maternal grandfather and paternal grandfather; Gestational diabetes in her brother; Irritable bowel syndrome in her mother; Osteoarthritis in her mother; Raynaud syndrome in her mother; Sjogren's syndrome in her mother.    Medications and Allergies     Medications  Outpatient Medications Marked as Taking for the 10/19/23 encounter (Office Visit) with Braxton Ornelas MD   Medication Sig Dispense Refill    calcium carbonate/vitamin D3 (CALCIUM+D ORAL) Take by mouth.      prenatal 25/iron fum/folic/dha (PRENATAL-1 ORAL) Take by mouth.         Allergies  Review of patient's allergies indicates:   Allergen Reactions    Adhesive Rash       Physical Examination     Vitals:    10/19/23 1351   BP: 112/84   Pulse: 79   Temp: 98.4 °F (36.9 °C)     Physical  Exam  Constitutional:       General: She is not in acute distress.     Appearance: She is not ill-appearing.   HENT:      Head: Normocephalic and atraumatic.      Right Ear: Tympanic membrane and ear canal normal.      Left Ear: Tympanic membrane and ear canal normal.      Nose: Nose normal. No congestion or rhinorrhea.   Eyes:      Pupils: Pupils are equal, round, and reactive to light.   Cardiovascular:      Rate and Rhythm: Normal rate and regular rhythm.      Pulses: Normal pulses.      Heart sounds: No murmur heard.  Pulmonary:      Effort: No respiratory distress.      Breath sounds: No wheezing, rhonchi or rales.   Abdominal:      General: Bowel sounds are normal.      Palpations: Abdomen is soft.      Tenderness: There is no abdominal tenderness.      Hernia: No hernia is present.   Musculoskeletal:         General: Tenderness and deformity present.      Cervical back: Normal range of motion and neck supple.      Lumbar back: Deformity, spasms and tenderness present. Decreased range of motion. Scoliosis present.   Lymphadenopathy:      Cervical: No cervical adenopathy.   Skin:     General: Skin is warm and dry.   Neurological:      Mental Status: She is alert.   Psychiatric:         Behavior: Behavior normal.         Thought Content: Thought content normal.          Assessment and Plan (including Health Maintenance)   :    Plan:         Health Maintenance Due   Topic Date Due    Lipid Panel  Never done    COVID-19 Vaccine (1) Never done    Influenza Vaccine (1) 09/01/2023       Problem List Items Addressed This Visit    None  Visit Diagnoses       Arthralgia, unspecified joint    -  Primary    Relevant Orders    CBC Auto Differential    Basic Metabolic Panel    ERICA EIA w/ Reflex to dsDNA/VICTORIANO    Rheumatoid Factor Screen    C-Reactive Protein    Uric Acid    Sedimentation Rate    Low back pain, unspecified back pain laterality, unspecified chronicity, unspecified whether sciatica present        Relevant Orders     CBC Auto Differential    Basic Metabolic Panel    ERICA EIA w/ Reflex to dsDNA/VICTORIANO    Rheumatoid Factor Screen    C-Reactive Protein    Uric Acid    Sedimentation Rate    Dysphagia, unspecified type        Relevant Orders    Ambulatory referral/consult to Gastroenterology    Scoliosis of thoracolumbar spine, unspecified scoliosis type        Relevant Orders    X-Ray Scoliosis Complete          Arthralgia, unspecified joint  -     CBC Auto Differential; Future; Expected date: 10/19/2023  -     Basic Metabolic Panel; Future; Expected date: 10/19/2023  -     ERICA EIA w/ Reflex to dsDNA/VICTORIANO; Future; Expected date: 10/19/2023  -     Rheumatoid Factor Screen; Future; Expected date: 10/19/2023  -     C-Reactive Protein; Future; Expected date: 10/19/2023  -     Uric Acid; Future; Expected date: 10/19/2023  -     Sedimentation Rate; Future; Expected date: 10/19/2023    Low back pain, unspecified back pain laterality, unspecified chronicity, unspecified whether sciatica present  -     CBC Auto Differential; Future; Expected date: 10/19/2023  -     Basic Metabolic Panel; Future; Expected date: 10/19/2023  -     ERICA EIA w/ Reflex to dsDNA/VICTORIANO; Future; Expected date: 10/19/2023  -     Rheumatoid Factor Screen; Future; Expected date: 10/19/2023  -     C-Reactive Protein; Future; Expected date: 10/19/2023  -     Uric Acid; Future; Expected date: 10/19/2023  -     Sedimentation Rate; Future; Expected date: 10/19/2023    Dysphagia, unspecified type  -     Ambulatory referral/consult to Gastroenterology; Future; Expected date: 10/26/2023    Scoliosis of thoracolumbar spine, unspecified scoliosis type  -     X-Ray Scoliosis Complete; Future; Expected date: 10/19/2023       Health Maintenance Topics with due status: Not Due       Topic Last Completion Date    Hemoglobin A1c (Diabetic Prevention Screening) 04/11/2022    TETANUS VACCINE 10/15/2022    Cervical Cancer Screening 04/04/2023       Procedures     Future Appointments   Date  Time Provider Department Center   4/9/2024  8:00 AM Mario Foster MD [facility]YN [facility]        No follow-ups on file.       Signature:  Braxton Ornelas MD  [facility]  [address] [place] [address]  [phone] Phone  [phone] Fax    Date of encounter: 10/19/23

## 2023-10-24 LAB — ANA SER QL: NEGATIVE

## 2023-11-08 ENCOUNTER — OFFICE VISIT (OUTPATIENT)
Dept: FAMILY MEDICINE | Facility: CLINIC | Age: 38
End: 2023-11-08
Payer: MEDICAID

## 2023-11-08 VITALS
DIASTOLIC BLOOD PRESSURE: 86 MMHG | HEIGHT: 69 IN | TEMPERATURE: 98 F | WEIGHT: 264 LBS | SYSTOLIC BLOOD PRESSURE: 128 MMHG | BODY MASS INDEX: 39.1 KG/M2 | HEART RATE: 94 BPM | OXYGEN SATURATION: 97 %

## 2023-11-08 DIAGNOSIS — R05.1 ACUTE COUGH: ICD-10-CM

## 2023-11-08 DIAGNOSIS — J01.00 ACUTE NON-RECURRENT MAXILLARY SINUSITIS: Primary | ICD-10-CM

## 2023-11-08 PROCEDURE — 3074F PR MOST RECENT SYSTOLIC BLOOD PRESSURE < 130 MM HG: ICD-10-PCS | Mod: ,,, | Performed by: FAMILY MEDICINE

## 2023-11-08 PROCEDURE — 3074F SYST BP LT 130 MM HG: CPT | Mod: ,,, | Performed by: FAMILY MEDICINE

## 2023-11-08 PROCEDURE — 3008F PR BODY MASS INDEX (BMI) DOCUMENTED: ICD-10-PCS | Mod: ,,, | Performed by: FAMILY MEDICINE

## 2023-11-08 PROCEDURE — 99213 PR OFFICE/OUTPT VISIT, EST, LEVL III, 20-29 MIN: ICD-10-PCS | Mod: 25,,, | Performed by: FAMILY MEDICINE

## 2023-11-08 PROCEDURE — 3008F BODY MASS INDEX DOCD: CPT | Mod: ,,, | Performed by: FAMILY MEDICINE

## 2023-11-08 PROCEDURE — 96372 PR INJECTION,THERAP/PROPH/DIAG2ST, IM OR SUBCUT: ICD-10-PCS | Mod: ,,, | Performed by: FAMILY MEDICINE

## 2023-11-08 PROCEDURE — 96372 THER/PROPH/DIAG INJ SC/IM: CPT | Mod: ,,, | Performed by: FAMILY MEDICINE

## 2023-11-08 PROCEDURE — 99213 OFFICE O/P EST LOW 20 MIN: CPT | Mod: 25,,, | Performed by: FAMILY MEDICINE

## 2023-11-08 RX ORDER — AZITHROMYCIN 250 MG/1
TABLET, FILM COATED ORAL
Qty: 6 TABLET | Refills: 0 | Status: SHIPPED | OUTPATIENT
Start: 2023-11-08 | End: 2024-03-09

## 2023-11-08 RX ORDER — DEXAMETHASONE SODIUM PHOSPHATE 4 MG/ML
4 INJECTION, SOLUTION INTRA-ARTICULAR; INTRALESIONAL; INTRAMUSCULAR; INTRAVENOUS; SOFT TISSUE
Status: COMPLETED | OUTPATIENT
Start: 2023-11-08 | End: 2023-11-08

## 2023-11-08 RX ORDER — CEFTRIAXONE 1 G/1
1 INJECTION, POWDER, FOR SOLUTION INTRAMUSCULAR; INTRAVENOUS
Status: COMPLETED | OUTPATIENT
Start: 2023-11-08 | End: 2023-11-08

## 2023-11-08 RX ORDER — METHYLPREDNISOLONE ACETATE 80 MG/ML
40 INJECTION, SUSPENSION INTRA-ARTICULAR; INTRALESIONAL; INTRAMUSCULAR; SOFT TISSUE
Status: COMPLETED | OUTPATIENT
Start: 2023-11-08 | End: 2023-11-08

## 2023-11-08 RX ADMIN — CEFTRIAXONE 1 G: 1 INJECTION, POWDER, FOR SOLUTION INTRAMUSCULAR; INTRAVENOUS at 02:11

## 2023-11-08 RX ADMIN — DEXAMETHASONE SODIUM PHOSPHATE 4 MG: 4 INJECTION, SOLUTION INTRA-ARTICULAR; INTRALESIONAL; INTRAMUSCULAR; INTRAVENOUS; SOFT TISSUE at 02:11

## 2023-11-08 RX ADMIN — METHYLPREDNISOLONE ACETATE 40 MG: 80 INJECTION, SUSPENSION INTRA-ARTICULAR; INTRALESIONAL; INTRAMUSCULAR; SOFT TISSUE at 02:11

## 2023-11-08 NOTE — PROGRESS NOTES
Braxton Ornelas MD   [facility]  [address] [place] [address]     PATIENT NAME: Sidra Winter  : 1985  DATE: 23  MRN: [id number]      Billing Provider: Braxton Ornelas MD  Level of Service: WI OFFICE/OUTPT VISIT, EST, LEVL III, 20-29 MIN  Patient PCP Information       Provider PCP Type    Braxton Ornelas MD General            Reason for Visit / Chief Complaint: Sinus Problem (Cough, postnasal drip, sore throat, sneezing, headache, ears feeling stopped up - started yesterday)         History of Present Illness / Problem Focused Workflow     Sidra Winter presents to the clinic with Sinus Problem (Cough, postnasal drip, sore throat, sneezing, headache, ears feeling stopped up - started yesterday)     HPI    Review of Systems     Review of Systems   Constitutional:  Negative for activity change, appetite change, fatigue and fever.   HENT:  Positive for nasal congestion, sinus pressure/congestion and sore throat. Negative for ear pain and hearing loss.    Respiratory:  Positive for cough. Negative for chest tightness and shortness of breath.    Cardiovascular:  Negative for chest pain and palpitations.   Gastrointestinal:  Negative for abdominal pain and fecal incontinence.   Genitourinary:  Negative for bladder incontinence and difficulty urinating.   Musculoskeletal:  Negative for arthralgias.   Integumentary:  Negative for rash.   Neurological:  Negative for dizziness and headaches.        Medical / Social / Family History     Past Medical History:   Diagnosis Date    Anxiety disorder, unspecified     History of COVID-19     Scoliosis        Past Surgical History:   Procedure Laterality Date     SECTION  2021     SECTION N/A 10/13/2022    Procedure:  SECTION;  Surgeon: Mario Foster MD;  Location: [facility] L&D;  Service: OB/GYN;  Laterality: N/A;       Social History  Sidra Winter  reports that she has quit  smoking. She has never been exposed to tobacco smoke. She has never used smokeless tobacco. She reports that she does not currently use alcohol. She reports that she does not currently use drugs.    Family History  Sidra Winter  family history includes Ankylosing spondylitis in her mother; Breast cancer in her maternal aunt and maternal grandfather; Diabetes in her maternal grandfather and paternal grandfather; Gestational diabetes in her brother; Irritable bowel syndrome in her mother; Osteoarthritis in her mother; Raynaud syndrome in her mother; Sjogren's syndrome in her mother.    Medications and Allergies     Medications  Outpatient Medications Marked as Taking for the 11/8/23 encounter (Office Visit) with Braxton Ornelas MD   Medication Sig Dispense Refill    calcium carbonate/vitamin D3 (CALCIUM+D ORAL) Take by mouth.      meloxicam (MOBIC) 15 MG tablet Take 1 tablet (15 mg total) by mouth once daily. 30 tablet 5    prenatal 25/iron fum/folic/dha (PRENATAL-1 ORAL) Take by mouth.      sertraline (ZOLOFT) 25 MG tablet Take 1 tablet (25 mg total) by mouth once daily. (Patient taking differently: Take 25 mg by mouth once daily. Takes prn) 90 tablet 0       Allergies  Review of patient's allergies indicates:   Allergen Reactions    Adhesive Rash       Physical Examination     Vitals:    11/08/23 1330   BP: 128/86   Pulse: 94   Temp: 98.2 °F (36.8 °C)     Physical Exam  Constitutional:       Appearance: Normal appearance.   HENT:      Head: Normocephalic and atraumatic.      Right Ear: Tympanic membrane normal.      Left Ear: Tympanic membrane normal.      Nose: Congestion and rhinorrhea present.      Mouth/Throat:      Pharynx: Posterior oropharyngeal erythema present.   Eyes:      Pupils: Pupils are equal, round, and reactive to light.   Cardiovascular:      Rate and Rhythm: Normal rate and regular rhythm.      Pulses: Normal pulses.      Heart sounds: Normal heart sounds.   Pulmonary:      Breath  sounds: No wheezing or rhonchi.   Abdominal:      Palpations: Abdomen is soft.   Lymphadenopathy:      Cervical: Cervical adenopathy present.   Skin:     General: Skin is warm and dry.   Neurological:      Mental Status: She is alert.          Assessment and Plan (including Health Maintenance)   :    Plan:         Health Maintenance Due   Topic Date Due    Lipid Panel  Never done    COVID-19 Vaccine (1) Never done    Influenza Vaccine (1) 09/01/2023       Problem List Items Addressed This Visit    None    There are no diagnoses linked to this encounter.   Health Maintenance Topics with due status: Not Due       Topic Last Completion Date    Hemoglobin A1c (Diabetic Prevention Screening) 04/11/2022    TETANUS VACCINE 10/15/2022    Cervical Cancer Screening 04/04/2023       Procedures     Future Appointments   Date Time Provider Department Center   12/11/2023  8:30 AM Mingo Jiménez MD [facility]   4/10/2024 10:30 AM Mario Foster MD [facility]        No follow-ups on file.       Signature:  Braxton Ornelas MD  [facility]  [address] [place] [address]  [phone] Phone  [phone] Fax    Date of encounter: 11/8/23

## 2023-12-11 ENCOUNTER — OFFICE VISIT (OUTPATIENT)
Dept: SPINE | Facility: CLINIC | Age: 38
End: 2023-12-11
Payer: MEDICAID

## 2023-12-11 ENCOUNTER — HOSPITAL ENCOUNTER (OUTPATIENT)
Dept: RADIOLOGY | Facility: HOSPITAL | Age: 38
Discharge: HOME | End: 2023-12-11
Attending: ORTHOPAEDIC SURGERY
Payer: MEDICAID

## 2023-12-11 DIAGNOSIS — M54.6 THORACIC BACK PAIN, UNSPECIFIED BACK PAIN LATERALITY, UNSPECIFIED CHRONICITY: Primary | ICD-10-CM

## 2023-12-11 DIAGNOSIS — M54.16 LUMBAR RADICULOPATHY: Primary | ICD-10-CM

## 2023-12-11 DIAGNOSIS — M41.86 LEVOSCOLIOSIS OF LUMBAR SPINE: ICD-10-CM

## 2023-12-11 DIAGNOSIS — M54.16 LUMBAR RADICULOPATHY: ICD-10-CM

## 2023-12-11 PROCEDURE — 72110 X-RAY EXAM L-2 SPINE 4/>VWS: CPT | Mod: TC

## 2023-12-11 PROCEDURE — 99213 OFFICE O/P EST LOW 20 MIN: CPT | Mod: PBBFAC | Performed by: ORTHOPAEDIC SURGERY

## 2023-12-11 PROCEDURE — 99204 PR OFFICE/OUTPT VISIT, NEW, LEVL IV, 45-59 MIN: ICD-10-PCS | Mod: S$PBB,,, | Performed by: ORTHOPAEDIC SURGERY

## 2023-12-11 PROCEDURE — 72110 X-RAY EXAM L-2 SPINE 4/>VWS: CPT | Mod: 26,,, | Performed by: ORTHOPAEDIC SURGERY

## 2023-12-11 PROCEDURE — 99204 OFFICE O/P NEW MOD 45 MIN: CPT | Mod: S$PBB,,, | Performed by: ORTHOPAEDIC SURGERY

## 2023-12-11 PROCEDURE — 72110 XR LUMBAR SPINE 4-5 VIEW WITH BENDING VIEWS: ICD-10-PCS | Mod: 26,,, | Performed by: ORTHOPAEDIC SURGERY

## 2023-12-11 NOTE — PROGRESS NOTES
AP, lateral, flexion/extension views of the lumbar spine reviewed    On the AP there is thoracolumbar curvature to the left.  There are 5 non-rib-bearing lumbar vertebrae.  On the lateral there is maintained lumbar lordosis.  There is mild spondylotic disease with decreased disc height and osteophyte formation noted.  No fractures or listhesis noted.  No instability on flexion-extension views.    Impression:  Spondylotic changes of the lumbar spine as noted above

## 2023-12-11 NOTE — PROGRESS NOTES
MDM/time:  Greater than 45 minutes spent on this encounter including 15 minutes reviewing imaging and notes, 20 minutes with the patient, 10 minutes documentation    ASSESSMENT:  38 y.o. female with thoracolumbar scoliosis     PLAN:  Physical therapy lumbar spine   Follow up 3 months     HPI:  38 y.o. female here for evaluation of lower back that radiates into the right side more than the left.  Dx with scoliosis at age 11 and then seen at [facility] at age 18yrs old.  They had discussed surgery at that time but decided against surgery.  Always has back pain but over the past 5 years.  Denies any type of numbness or tingling in the arms/hands and lower extremities.  She denies difficulty with  strength.  Issues with balance but no falls.  Denies bladder bowel incontinence.  No issues walking distances.  Currently started taking meloxicam as needed for pain which has helped her discomfort.  No recent physical therapy.  No prior pain management.  No recent MRI.  No prior spine surgery.  Patient is not a smoker.    IMAGING:  AP, lateral, flexion/extension views of the lumbar spine reviewed     On the AP there is thoracolumbar curvature to the left.  There are 5 non-rib-bearing lumbar vertebrae.  On the lateral there is maintained lumbar lordosis.  There is mild spondylotic disease with decreased disc height and osteophyte formation noted.  No fractures or listhesis noted.  No instability on flexion-extension views.     Impression:  Spondylotic changes of the lumbar spine as noted above       Past Medical History:   Diagnosis Date    Anxiety disorder, unspecified     History of COVID-19     Scoliosis      Past Surgical History:   Procedure Laterality Date     SECTION  2021     SECTION N/A 10/13/2022    Procedure:  SECTION;  Surgeon: Mario Foster MD;  Location: [facility] L&D;  Service: OB/GYN;  Laterality: N/A;     Social History     Tobacco Use    Smoking status: Former     Passive  exposure: Never    Smokeless tobacco: Never   Substance Use Topics    Alcohol use: Not Currently     Comment: Socially    Drug use: Not Currently      Current Outpatient Medications   Medication Instructions    azithromycin (Z-ROMERO) 250 MG tablet Take 2 tablets by mouth on day 1; Take 1 tablet by mouth on days 2-5<BR>    calcium carbonate/vitamin D3 (CALCIUM+D ORAL) Oral    meloxicam (MOBIC) 15 mg, Oral, Daily    prenatal 25/iron fum/folic/dha (PRENATAL-1 ORAL) Oral    sertraline (ZOLOFT) 25 mg, Oral, Daily        EXAM:  Constitutional  General Appearance:  There is no height or weight on file to calculate BMI., NAD  Psychiatric   Orientation: Oriented to time, oriented to place, oriented to person  Mood and Affect: Active and alert, normal mood, normal affect  Gait and Station   Appearance:  Normal gait, normal tandem gait, able to walk on toes, able to walk on heels    LUMBAR  Musculoskeletal System   Hips: Normal appearance, no leg length discrepancy, normal motion; left, normal motion; right    Lumbar Spine                   Inspection:  abnormal alignment, normal sagittal balance                  Range of motion: Normal flexion, extension, lateral bending, rotation. No pain with range of motion                  Bony Palpation of the Lumbar Spine:  No tenderness of the spinous process, no tenderness of the sacrum, no tenderness of the coccyx                  Bony Palpation of the Right Hip:  No tenderness of the iliac crest, no tenderness of the sciatic notch, no tenderness of the SI joint                  Bony Palpation of the Left Hip:  No tenderness of the iliac crest, no tenderness of the sciatic notch, no tenderness of the SI joint                  Soft Tissue Palpation on the Right:  No tenderness of the paraspinal region, no tenderness of the iliolumbar region                  Soft Tissue Palpation on the Left:  No tenderness of the paraspinal region, no tenderness of the iliolumbar region    Motor  Strength   L1 Right:  Hip flexion iliopsoas 5/5    L1 Left:  Hip flexion iliopsoas 5/5              L2-L4 Right:  Knee extension quadriceps 5/5, tibialis anterior 5/5              L2-L4 Left:  Knee extension quadriceps 5/5, tibialis anterior 5/5   L5 Right:  Extensor hallucis llongus 5/5,    L5 Left:  Extensor hallucis longus 5/5,    S1 Right:  Plantar flexion gastrocnemius 5/5   S1 Left:  Plantar flexion gastrocnemius 5/5    Neurological System   Ankle Reflex Right:  normal   Ankle Reflex Left: normal   Knee Reflex Right:  normal   Knee Reflex Left:  normal   Sensation on the Right:  L2 normal, L3 normal, L4 normal, L5 normal, S1 normal   Sensation on the Left:  L2 normal, L3 normal, L4 normal, L5 normal, S1 normal              Special Test on the Right:  Seated straight leg raising test negative, no clonus of the ankle              Special Test on the Left:  Seated straight leg raising test negative, no clonus of the ankle    Skin   Lumbosacral Spine:  Normal skin    Cardiovascular System   Arterial Pulses Right:  Posterior tibialis normal, dorsalis pedis normal   Arterial Pulses Left:  Posterior tibialis normal, dorsalis pedis normal   Edema Right: None   Edema Left:  None     WHEEZES

## 2024-01-03 ENCOUNTER — CLINICAL SUPPORT (OUTPATIENT)
Dept: REHABILITATION | Facility: HOSPITAL | Age: 39
End: 2024-01-03
Payer: MEDICAID

## 2024-01-03 DIAGNOSIS — G89.29 CHRONIC BILATERAL THORACIC BACK PAIN: ICD-10-CM

## 2024-01-03 DIAGNOSIS — M54.16 LUMBAR RADICULOPATHY: ICD-10-CM

## 2024-01-03 DIAGNOSIS — G89.29 CHRONIC BILATERAL LOW BACK PAIN WITH RIGHT-SIDED SCIATICA: Primary | ICD-10-CM

## 2024-01-03 DIAGNOSIS — M54.6 CHRONIC BILATERAL THORACIC BACK PAIN: ICD-10-CM

## 2024-01-03 DIAGNOSIS — M54.6 THORACIC BACK PAIN, UNSPECIFIED BACK PAIN LATERALITY, UNSPECIFIED CHRONICITY: ICD-10-CM

## 2024-01-03 DIAGNOSIS — M54.41 CHRONIC BILATERAL LOW BACK PAIN WITH RIGHT-SIDED SCIATICA: Primary | ICD-10-CM

## 2024-01-03 PROCEDURE — 97161 PT EVAL LOW COMPLEX 20 MIN: CPT

## 2024-01-03 PROCEDURE — 97110 THERAPEUTIC EXERCISES: CPT

## 2024-01-03 PROCEDURE — 97014 ELECTRIC STIMULATION THERAPY: CPT

## 2024-01-03 NOTE — PLAN OF CARE
"  [facility]   Physical Therapy Initial Evaluation      Name: Sidra Winter  Clinic Number: [id number]    Therapy Diagnosis:   Encounter Diagnoses   Name Primary?    Thoracic back pain, unspecified back pain laterality, unspecified chronicity     Lumbar radiculopathy     Chronic bilateral low back pain with right-sided sciatica Yes    Chronic bilateral thoracic back pain         Physician: Mingo Jiménez MD    Physician Orders: PT Eval and Treat   Medical Diagnosis from Referral: thoracic pain   Evaluation Date: 1/3/2024  Authorization Period Expiration: 12/10/2024  Plan of Care Expiration: 2/2/2024   Progress Note Due: 2/2/2024  Date of Surgery: NA   Visit # / Visits authorized: 1/8   FOTO: Needs FOTO at next visit     Precautions: Standard     Time In: 8:03  Time Out: 9:00  Total Billable Time: 57 minutes    Subjective     Date of onset: chronic history     History of current condition - Sidra reports: beging diagnose with scoliosis at age 18 and chronic back pain for he entire adult life. Patient reports that her pain has recently been becoming more intense stating "I can't block it out anymore like I used to." Patient reports occasional episodes of radiating pain into R LE with increased activity levels especially carrying her children too much. Patient has two children under the age of three and had two C-sections.     Falls: None     Imaging: X-rays: Spondylotic changes of the lumbar spine as noted above (copied from visit on 12/11/2023)    Prior Therapy: No recent PT   Social History:  lives with their family  Occupation: Unemployed   Prior Level of Function: Independent   Current Level of Function: Independent     Pain:  Current 3/10, worst 10/10, best 3/10   Location: bilateral low back and R LE (occasionally)     Description: Aching, Dull, Throbbing, and Sharp  Aggravating Factors: Lifting and increased activity levels  Easing Factors: pain medication and heating " "pad    Patients goals: "to feel better"      Medical History:   Past Medical History:   Diagnosis Date    Anxiety disorder, unspecified     History of COVID-19     Scoliosis        Surgical History:   Sidra Winter  has a past surgical history that includes  section (2021) and  section (N/A, 10/13/2022).    Medications:   Sidra has a current medication list which includes the following prescription(s): azithromycin, calcium carbonate/vitamin d3, meloxicam, prenatal 25/iron fum/folic/dha, and sertraline.    Allergies:   Review of patient's allergies indicates:   Allergen Reactions    Adhesive Rash        Objective    Posture:  Standing lordosis: Increased  Sitting lordosis: Increased  Iliac crest height: right increased  PSIS height: right increased  Pelvic rotation/torsion: Yes-increased anterior rotation  Scoliosis: Yes-thoracic curve to the L     Patient presents with full lumbar active range of motion with reports of pain with extension and R lateral flexion    MANUAL MUSCLE TEST  Right left   Hip flexion MMT number: 4/5 MMT strength: 4+/5   Hip abduction MMT strength: 4-/5 MMT strength: 4+/5   Knee extension MMT strength: 4+/5 MMT strength: 4+/5   Knee flexion  MMT strength: 4+/5 MMT strength: 4+/5   Ankle dorsiflexion MMT strength: 4+/5 MMT strength: 4+/5   Ankle plantar flexion  MMT strength: 4+/5 MMT strength: 4+/5   Extensor hallucis longus MMT strength: 4+/5 MMT strength: 4+/5     ROM/flexibility right left   Hamstring 90/90 (-10) WFL WFL     Special test Right  Left    SLR test < 60 degrees Negative Negative   SLR test > 60 degrees Negative Negative   Sitting slump test Positive Negative   Piriformis test Positive Negative   HELGA test Negative Negative   SI forward bend Negative Negative   SI distraction Negative Negative   SI compression Positive Negative       Spinal mobility:  Patient has decreased thoracic and sacral mobility with joint mobilizations and increased lumbar " "intersegmental mobility with posterior.anterior mobilizations.     Palpation: Patient presents with increased tissue tightness in B thoracic paraspinals (R>L), R quadratus lumborum, R piriformis, and R glutes    Dermatome:Patient reports no sensory changes.      Treatment   Total Treatment time (time-based codes) separate from Evaluation: 43 minutes     Sidra received the treatments listed below:      therapeutic exercises to develop strength, flexibility, posture, and core stabilization for 33 minutes including: See flowsheet below     Ther-Ex Reps    Nustep ----   Wedge ---   Hamstring Stretch ---   Posterior Pelvic Tilts 20 x 3"    Lower Trunk Rotations 3 x 10" each    Single Knee to Chest Stretch 3 x 10 " Each    Piriformis Stretch 2 x 20" each    Open Books  3 x 10" each    Latissimus/ QL Stretch  2 x 20" Each side                                  supervised modalities after being cleared for contradictions: Biphasic ESTIM:  Sidra received  electrical stimulation for pain and tissue tightness to the B thoracic paraspinals and B piriformis muscles . Pt received burst mode at a rate of 2 pps for 10 minutes. Sidra tolerated treatment well without any adverse effects.     hot pack for 10 minutes to B thoracic, lumbar and sacral muscles with ESTIM.    Patient Education and Home Exercises   Education provided:   - eval findings, plan of care, Home exercise program    Written Home Exercises Provided: yes. Exercises were reviewed and Sidra was able to demonstrate them prior to the end of the session.  Sidra demonstrated good  understanding of the education provided. See EMR under Patient Instructions for exercises provided during therapy sessions.    Assessment     Sidra is a 38 y.o. female referred to outpatient Physical Therapy with a medical diagnosis of thoracic pain and lumbar radiculopathy. Patient presents with thoracic and low back pain, R LE radiating symptoms, core and LE muscle " weakness, tissue tightness, and impaired motor control. Patient's impairments have begun to limit her overall activity tolerance more.     Patient prognosis is Good.   Patient will benefit from skilled outpatient Physical Therapy to address the deficits stated above and in the chart below, provide patient /family education, and to maximize patientt's level of independence.     Plan of care discussed with patient: Yes  Patient's spiritual, cultural and educational needs considered and patient is agreeable to the plan of care and goals as stated below:     Anticipated Barriers for therapy: chronicity of pain, scoliosis, compliance with Home exercise program and treatment    Medical Necessity is demonstrated by the following  History  Co-morbidities and personal factors that may impact the plan of care [] LOW: no personal factors / co-morbidities  [x] MODERATE: 1-2 personal factors / co-morbidities  [] HIGH: 3+ personal factors / co-morbidities    Moderate / High Support Documentation:   Co-morbidities affecting plan of care: scoliosis, history of two C-sections     Personal Factors:   no deficits     Examination  Body Structures and Functions, activity limitations and participation restrictions that may impact the plan of care [x] LOW: addressing 1-2 elements  [] MODERATE: 3+ elements  [] HIGH: 4+ elements (please support below)    Moderate / High Support Documentation: NA     Clinical Presentation [] LOW: stable  [x] MODERATE: Evolving  [] HIGH: Unstable     Decision Making/ Complexity Score: low       Goals:  Short Term Goals: 2 weeks   Patient will independently complete Home exercise program with correct form.   Patient will report decreased resting pain to less than or equal to 2/10 for improved quality of life.     Long Term Goals: 4 weeks   Patient will report decrease in pain to less than or equal to 4/10 when standing greater than 30 minutes to increase activity tolerance.   Patient will report decrease in  radiating occurrences from 40% of the time  to less than or equal to 15% of the time to allow patient the ability to perform activities of daily living   Patient will increase B lower extremity strength to 4+/5 to improve ambulation ability  Patient will lift 10 pounds from the floor with and carry it 50 feet with reports of less than or equal 4/10 low back pain for increased tolerance to ADLs.   Plan   Plan of care Certification: 1/3/2024 to 2/2/2024.    Outpatient Physical Therapy 2 times weekly for 4 weeks to include the following interventions: Electrical Stimulation unattended, Manual Therapy, Moist Heat/ Ice, Patient Education, Therapeutic Activities, Therapeutic Exercise, and Ultrasound.     Marco Antonio Pollard PT, DPT         Physician's Signature: _________________________________________   Date: ________________

## 2024-01-04 ENCOUNTER — CLINICAL SUPPORT (OUTPATIENT)
Dept: REHABILITATION | Facility: HOSPITAL | Age: 39
End: 2024-01-04
Payer: MEDICAID

## 2024-01-04 DIAGNOSIS — G89.29 CHRONIC BILATERAL LOW BACK PAIN WITH RIGHT-SIDED SCIATICA: Primary | ICD-10-CM

## 2024-01-04 DIAGNOSIS — M54.6 CHRONIC BILATERAL THORACIC BACK PAIN: ICD-10-CM

## 2024-01-04 DIAGNOSIS — G89.29 CHRONIC BILATERAL THORACIC BACK PAIN: ICD-10-CM

## 2024-01-04 DIAGNOSIS — M54.41 CHRONIC BILATERAL LOW BACK PAIN WITH RIGHT-SIDED SCIATICA: Primary | ICD-10-CM

## 2024-01-04 PROCEDURE — 97014 ELECTRIC STIMULATION THERAPY: CPT | Mod: CQ

## 2024-01-04 PROCEDURE — 97110 THERAPEUTIC EXERCISES: CPT | Mod: CQ

## 2024-01-04 NOTE — PROGRESS NOTES
"[facility]   Physical Therapy Treatment Note      Name: Sidra Winter  Clinic Number: [id number]    Therapy Diagnosis:        Encounter Diagnoses   Name Primary?    Thoracic back pain, unspecified back pain laterality, unspecified chronicity      Lumbar radiculopathy      Chronic bilateral low back pain with right-sided sciatica Yes    Chronic bilateral thoracic back pain          Physician: Mingo Jiménez MD     Physician Orders: PT Eval and Treat   Medical Diagnosis from Referral: thoracic pain   Evaluation Date: 1/3/2024  Authorization Period Expiration: 12/10/2024  Plan of Care Expiration: 2/2/2024   Progress Note Due: 2/2/2024  Date of Surgery: NA   Visit # / Visits authorized: 2/8   FOTO: Needs FOTO at next visit      Precautions: Standard      Time In: 13:15  Time Out: 13:58  Total Billable Time: 43 minutes   Visit Date: 1/4/2024      PTA Visit #: 1/5       Subjective     Patient reports: "I'm actually doing a little better".   .  She was compliant with home exercise program.  Response to previous treatment: Decreased pain   Functional change: Early in Plan of Care     Pain: 2/10  Location: bilateral back      Objective      Objective Measures updated at progress report unless specified.     Treatment     Sidra received the treatments listed below:      therapeutic exercises to develop strength, flexibility, posture, and core stabilization for 31 minutes including: See flowsheet below      Ther-Ex Reps    Nustep 8 minutes *    Wedge 2 minutes *    Hamstring Stretch 3 x 30" each *    Posterior Pelvic Tilts 30 x 3" *   Lower Trunk Rotations 5 x 10" each *   Single Knee to Chest Stretch 5 x 10 " Each *   Piriformis Stretch 2 x 20" each    Figure 4 Stretch  2 x 20" each    Open Books  5 x 10" each *   Latissimus/ QL Stretch  2 x 20" Each side                                                 supervised modalities after being cleared for contradictions: Biphasic ESTIM:  " "Sidra received  electrical stimulation for pain and tissue tightness to the B thoracic paraspinals and B piriformis muscles . Pt received burst mode at a rate of 2 pps for 10 minutes. Sidra tolerated treatment well without any adverse effects.      hot pack for 10 minutes to B thoracic, lumbar and sacral muscles with ESTIM.  Patient Education and Home Exercises       Education provided:   - Plan of Care, Home exercise program, Delayed onset muscle soreness     Written Home Exercises Provided: Patient instructed to cont prior HEP. Exercises were reviewed and Sidra was able to demonstrate them prior to the end of the session.  Sidra demonstrated fair  understanding of the education provided. See Electronic Medical Record under Patient Instructions for exercises provided during therapy sessions    Assessment     Sidra is a 38 y.o. female referred to outpatient Physical Therapy with a medical diagnosis of thoracic pain and lumbar radiculopathy. Patient presents with thoracic and low back pain, R LE radiating symptoms, core and LE muscle weakness, tissue tightness, and impaired motor control. Patient's impairments have begun to limit her overall activity tolerance more.  LPTA increased reps of Therapeutic exercises as tolerated by patient, and provided mod verbal and visual cues for proper alignment, speed of movement, count, and hold times.  Patient does not report increased pain during physical therapy treatment session, and states "I feel good" at end of PT treatment post Therapeutic exercises and modalities.  No adverse effects noted or reported.       Patient prognosis is Good.     Patient will continue to benefit from skilled outpatient physical therapy to address the deficits listed in the problem list box on initial evaluation, provide pt/family education and to maximize pt's level of independence in the home and community environment.     Patient's spiritual, cultural and educational needs " considered and pt agreeable to plan of care and goals.     Anticipated barriers to physical therapy: chronicity of pain, scoliosis, compliance with Home exercise program and treatment     Goals:   Short Term Goals: 2 weeks   Patient will independently complete Home exercise program with correct form.   Patient will report decreased resting pain to less than or equal to 2/10 for improved quality of life.      Long Term Goals: 4 weeks   Patient will report decrease in pain to less than or equal to 4/10 when standing greater than 30 minutes to increase activity tolerance.   Patient will report decrease in radiating occurrences from 40% of the time  to less than or equal to 15% of the time to allow patient the ability to perform activities of daily living   Patient will increase B lower extremity strength to 4+/5 to improve ambulation ability  Patient will lift 10 pounds from the floor with and carry it 50 feet with reports of less than or equal 4/10 low back pain for increased tolerance to ADLs.     Plan     Plan of care Certification: 1/3/2024 to 2/2/2024.  Outpatient Physical Therapy 2 times weekly for 4 weeks to include the following interventions: Electrical Stimulation unattended, Manual Therapy, Moist Heat/ Ice, Patient Education, Therapeutic Activities, Therapeutic Exercise, and Ultrasound.     Continue Plan of Care per PT order to progress patient toward rehab goals as tolerated by patient.   Jessika Jay, PTA  1/4/2024

## 2024-01-09 ENCOUNTER — CLINICAL SUPPORT (OUTPATIENT)
Dept: REHABILITATION | Facility: HOSPITAL | Age: 39
End: 2024-01-09
Payer: MEDICAID

## 2024-01-09 DIAGNOSIS — M54.41 CHRONIC BILATERAL LOW BACK PAIN WITH RIGHT-SIDED SCIATICA: Primary | ICD-10-CM

## 2024-01-09 DIAGNOSIS — M54.6 CHRONIC BILATERAL THORACIC BACK PAIN: ICD-10-CM

## 2024-01-09 DIAGNOSIS — G89.29 CHRONIC BILATERAL LOW BACK PAIN WITH RIGHT-SIDED SCIATICA: Primary | ICD-10-CM

## 2024-01-09 DIAGNOSIS — G89.29 CHRONIC BILATERAL THORACIC BACK PAIN: ICD-10-CM

## 2024-01-09 PROCEDURE — 97014 ELECTRIC STIMULATION THERAPY: CPT

## 2024-01-09 PROCEDURE — 97110 THERAPEUTIC EXERCISES: CPT

## 2024-01-09 NOTE — PROGRESS NOTES
"[facility]   Physical Therapy Treatment Note      Name: Sidra Winter  Clinic Number: [id number]    Therapy Diagnosis:        Encounter Diagnoses   Name Primary?    Thoracic back pain, unspecified back pain laterality, unspecified chronicity      Lumbar radiculopathy      Chronic bilateral low back pain with right-sided sciatica Yes    Chronic bilateral thoracic back pain          Physician: Mingo Jiménez MD     Physician Orders: PT Eval and Treat   Medical Diagnosis from Referral: thoracic pain   Evaluation Date: 1/3/2024  Authorization Period Expiration: 12/10/2024  Plan of Care Expiration: 2/2/2024   Progress Note Due: 2/2/2024  Date of Surgery: NA   Visit # / Visits authorized: 3/8   FOTO: Needs FOTO at next visit      Precautions: Standard      Time In: 10:32  Time Out: 11:16  Total Billable Time: 44 minutes     Visit Date: 1/9/2024      PTA Visit #: 0/5   Subjective   Patient reports: "It's back up to about a 3/10."  .  She was compliant with home exercise program.  Response to previous treatment: Decreased pain   Functional change: Early in Plan of Care     Pain: 3/10  Location: bilateral low back      Objective      Objective Measures updated at progress report unless specified.     Treatment     Sidra received the treatments listed below:      therapeutic exercises to develop strength, flexibility, posture, and core stabilization for 33 minutes including: See flowsheet below      Ther-Ex Reps    Nustep 5 minutes   Wedge 2 minutes    Hamstring Stretch 2 x 30" each    Posterior Pelvic Tilts 30 x 3" *   Lower Trunk Rotations 5 x 10" each *   Single Knee to Chest Stretch 5 x 10 " Each *   Piriformis Stretch 2 x 20" each    Figure 4 Stretch  2 x 20" each    Open Books  5 x 10" each *   Latissimus/ QL Stretch  2 x 20" Each side                                                 supervised modalities after being cleared for contradictions: Biphasic ESTIM:  Sidra received  " electrical stimulation for pain and tissue tightness to the B thoracic paraspinals and B piriformis muscles . Pt received burst mode at a rate of 2 pps for 10 minutes. Sidra tolerated treatment well without any adverse effects.      hot pack for 10 minutes to B thoracic, lumbar and sacral muscles with ESTIM.  Patient Education and Home Exercises       Education provided:   - Plan of Care, Home exercise program, Delayed onset muscle soreness     Written Home Exercises Provided: Patient instructed to cont prior HEP. Exercises were reviewed and Sidra was able to demonstrate them prior to the end of the session.  Sidra demonstrated fair  understanding of the education provided. See Electronic Medical Record under Patient Instructions for exercises provided during therapy sessions    Assessment     Sidra is a 38 y.o. female referred to outpatient Physical Therapy with a medical diagnosis of thoracic pain and lumbar radiculopathy. Patient presents with thoracic and low back pain, R LE radiating symptoms, core and LE muscle weakness, tissue tightness, and impaired motor control. Patient's impairments have begun to limit her overall activity tolerance more. PT provided patient with proper setup on NuStep to promote musculoskeletal warm-up prior to exercises. Patient required moderate verbal and visual cues for form, hold times and reps with therapeutic stretches. Patient had no reports of increased pain or adverse effects to treatment tasks.       Patient prognosis is Good.     Patient will continue to benefit from skilled outpatient physical therapy to address the deficits listed in the problem list box on initial evaluation, provide pt/family education and to maximize pt's level of independence in the home and community environment.     Patient's spiritual, cultural and educational needs considered and pt agreeable to plan of care and goals.     Anticipated barriers to physical therapy: chronicity of pain,  scoliosis, compliance with Home exercise program and treatment     Goals:   Short Term Goals: 2 weeks   Patient will independently complete Home exercise program with correct form.   Patient will report decreased resting pain to less than or equal to 2/10 for improved quality of life.      Long Term Goals: 4 weeks   Patient will report decrease in pain to less than or equal to 4/10 when standing greater than 30 minutes to increase activity tolerance.   Patient will report decrease in radiating occurrences from 40% of the time  to less than or equal to 15% of the time to allow patient the ability to perform activities of daily living   Patient will increase B lower extremity strength to 4+/5 to improve ambulation ability  Patient will lift 10 pounds from the floor with and carry it 50 feet with reports of less than or equal 4/10 low back pain for increased tolerance to ADLs.     Plan     Plan of care Certification: 1/3/2024 to 2/2/2024.  Outpatient Physical Therapy 2 times weekly for 4 weeks to include the following interventions: Electrical Stimulation unattended, Manual Therapy, Moist Heat/ Ice, Patient Education, Therapeutic Activities, Therapeutic Exercise, and Ultrasound.     Continue Plan of Care per PT order to progress patient toward rehab goals as tolerated by patient.   Marco Antonio Pollard, PT, DPT    1/9/2024

## 2024-01-11 ENCOUNTER — CLINICAL SUPPORT (OUTPATIENT)
Dept: REHABILITATION | Facility: HOSPITAL | Age: 39
End: 2024-01-11
Payer: MEDICAID

## 2024-01-11 DIAGNOSIS — M54.41 CHRONIC BILATERAL LOW BACK PAIN WITH RIGHT-SIDED SCIATICA: Primary | ICD-10-CM

## 2024-01-11 DIAGNOSIS — G89.29 CHRONIC BILATERAL LOW BACK PAIN WITH RIGHT-SIDED SCIATICA: Primary | ICD-10-CM

## 2024-01-11 PROCEDURE — 97110 THERAPEUTIC EXERCISES: CPT | Mod: CQ

## 2024-01-11 PROCEDURE — 97014 ELECTRIC STIMULATION THERAPY: CPT | Mod: CQ

## 2024-01-11 NOTE — PROGRESS NOTES
"[facility]   Physical Therapy Treatment Note      Name: Sidra Winter  Clinic Number: [id number]    Therapy Diagnosis:        Encounter Diagnoses   Name Primary?    Thoracic back pain, unspecified back pain laterality, unspecified chronicity      Lumbar radiculopathy      Chronic bilateral low back pain with right-sided sciatica Yes    Chronic bilateral thoracic back pain          Physician: Mingo Jiménez MD     Physician Orders: PT Eval and Treat   Medical Diagnosis from Referral: thoracic pain   Evaluation Date: 1/3/2024  Authorization Period Expiration: 12/10/2024  Plan of Care Expiration: 2/2/2024   Progress Note Due: 2/2/2024  Date of Surgery: NA   Visit # / Visits authorized: 4/8   FOTO: Needs FOTO at 5th visit      Precautions: Standard      Time In: 10:58  Time Out: 11:56  Total Billable Time: 58 minutes     Visit Date: 1/11/2024      PTA Visit #: 1/5   Subjective   Patient reports: "I feel a little better."  .  She was compliant with home exercise program.  Response to previous treatment: Decreased pain   Functional change: Early in Plan of Care     Pain: 2/10  Location: bilateral low back      Objective      Objective Measures updated at progress report unless specified.     Treatment     Sidra received the treatments listed below:      therapeutic exercises to develop strength, flexibility, posture, and core stabilization for 48 minutes including: See flowsheet below      Ther-Ex Reps    Nustep 5 minutes   Wedge 2 minutes    Hamstring Stretch 2 x 30" each    Posterior Pelvic Tilts 30 x 3"    Lower Trunk Rotations 5 x 10" each    Single Knee to Chest Stretch 5 x 10 " Each    Piriformis Stretch 2 x 20" each    Figure 4 Stretch  2 x 20" each    Open Books  5 x 10" each    Latissimus/ QL Stretch  2 x 20" each side                                                 supervised modalities after being cleared for contradictions: Biphasic ESTIM:  Sidra received  electrical " stimulation for pain and tissue tightness to the B thoracic paraspinals and B piriformis muscles . Pt received burst mode at a rate of 2 pps for 10 minutes. Sidra tolerated treatment well without any adverse effects.      hot pack for 10 minutes to B thoracic, lumbar and sacral muscles with ESTIM.  Patient Education and Home Exercises       Education provided:   - Plan of Care, Home exercise program, Delayed onset muscle soreness     Written Home Exercises Provided: Patient instructed to cont prior HEP. Exercises were reviewed and Sidra was able to demonstrate them prior to the end of the session.  Sidra demonstrated fair  understanding of the education provided. See Electronic Medical Record under Patient Instructions for exercises provided during therapy sessions    Assessment     Sidra is a 38 y.o. female referred to outpatient Physical Therapy with a medical diagnosis of thoracic pain and lumbar radiculopathy. Patient presents with thoracic and low back pain, R LE radiating symptoms, core and LE muscle weakness, tissue tightness, and impaired motor control. Patient's impairments have begun to limit her overall activity tolerance more. LPTA provided pt with proper setup on NuStep to promote musculoskeletal warm-up prior to exercises. Pt progressed through exercises with improved flexibility and carryover.  Pt had no reports of increased pain or adverse effects from tx tasks.       Patient prognosis is Good.     Patient will continue to benefit from skilled outpatient physical therapy to address the deficits listed in the problem list box on initial evaluation, provide pt/family education and to maximize pt's level of independence in the home and community environment.     Patient's spiritual, cultural and educational needs considered and pt agreeable to plan of care and goals.     Anticipated barriers to physical therapy: chronicity of pain, scoliosis, compliance with Home exercise program and  treatment     Goals:   Short Term Goals: 2 weeks   Patient will independently complete Home exercise program with correct form.   Patient will report decreased resting pain to less than or equal to 2/10 for improved quality of life.      Long Term Goals: 4 weeks   Patient will report decrease in pain to less than or equal to 4/10 when standing greater than 30 minutes to increase activity tolerance.   Patient will report decrease in radiating occurrences from 40% of the time  to less than or equal to 15% of the time to allow patient the ability to perform activities of daily living   Patient will increase B lower extremity strength to 4+/5 to improve ambulation ability  Patient will lift 10 pounds from the floor with and carry it 50 feet with reports of less than or equal 4/10 low back pain for increased tolerance to ADLs.     Plan     Plan of care Certification: 1/3/2024 to 2/2/2024.  Outpatient Physical Therapy 2 times weekly for 4 weeks to include the following interventions: Electrical Stimulation unattended, Manual Therapy, Moist Heat/ Ice, Patient Education, Therapeutic Activities, Therapeutic Exercise, and Ultrasound.     Continue Plan of Care per PT order to progress patient toward rehab goals as tolerated by patient.   MONICA Erazo  1/11/2024

## 2024-01-18 ENCOUNTER — CLINICAL SUPPORT (OUTPATIENT)
Dept: REHABILITATION | Facility: HOSPITAL | Age: 39
End: 2024-01-18
Payer: MEDICAID

## 2024-01-18 DIAGNOSIS — M54.41 CHRONIC BILATERAL LOW BACK PAIN WITH RIGHT-SIDED SCIATICA: Primary | ICD-10-CM

## 2024-01-18 DIAGNOSIS — M54.6 CHRONIC BILATERAL THORACIC BACK PAIN: ICD-10-CM

## 2024-01-18 DIAGNOSIS — G89.29 CHRONIC BILATERAL LOW BACK PAIN WITH RIGHT-SIDED SCIATICA: Primary | ICD-10-CM

## 2024-01-18 DIAGNOSIS — G89.29 CHRONIC BILATERAL THORACIC BACK PAIN: ICD-10-CM

## 2024-01-18 PROCEDURE — 97110 THERAPEUTIC EXERCISES: CPT | Mod: CQ

## 2024-01-18 PROCEDURE — 97014 ELECTRIC STIMULATION THERAPY: CPT | Mod: CQ

## 2024-01-18 NOTE — PROGRESS NOTES
"[facility]   Physical Therapy Treatment Note      Name: Sidra Winter  Clinic Number: [id number]    Therapy Diagnosis:        Encounter Diagnoses   Name Primary?    Thoracic back pain, unspecified back pain laterality, unspecified chronicity      Lumbar radiculopathy      Chronic bilateral low back pain with right-sided sciatica Yes    Chronic bilateral thoracic back pain          Physician: Mingo Jiménez MD     Physician Orders: PT Eval and Treat   Medical Diagnosis from Referral: thoracic pain   Evaluation Date: 1/3/2024  Authorization Period Expiration: 12/10/2024  Plan of Care Expiration: 2/2/2024   Progress Note Due: 2/2/2024  Date of Surgery: NA   Visit # / Visits authorized: 5/8   FOTO: Needs FOTO at 8th visit      Precautions: Standard      Time In: 9:30  Time Out: 10:25  Total Billable Time: 55 minutes     Visit Date: 1/18/2024      PTA Visit #: 1/5   Subjective   Patient reports: "I'm hurting today but it's the weather."  .  She was compliant with home exercise program.  Response to previous treatment: Decreased pain   Functional change: Early in Plan of Care     Pain: 5/10  Location: bilateral low back      Objective      Objective Measures updated at progress report unless specified.     Treatment     Sidra received the treatments listed below:      therapeutic exercises to develop strength, flexibility, posture, and core stabilization for 48 minutes including: See flowsheet below      Ther-Ex Reps    Nustep 6 minutes   Wedge 2 minutes    Hamstring Stretch 2 x 30" each    Posterior Pelvic Tilts 30 x 3"    Lower Trunk Rotations 5 x 10" each    Single Knee to Chest Stretch 5 x 10 " Each    Piriformis Stretch 2 x 20" each    Figure 4 Stretch  2 x 20" each    Open Books  5 x 10" each    Latissimus/ QL Stretch  2 x 20" each side                                                 supervised modalities after being cleared for contradictions: Biphasic ESTIM:  Sidra " received  electrical stimulation for pain and tissue tightness to the B thoracic paraspinals and B piriformis muscles . Pt received burst mode at a rate of 2 pps for 10 minutes. Sidra tolerated treatment well without any adverse effects.      hot pack for 10 minutes to B thoracic, lumbar and sacral muscles with ESTIM.  Patient Education and Home Exercises       Education provided:   - Plan of Care, Home exercise program, Delayed onset muscle soreness     Written Home Exercises Provided: Patient instructed to cont prior HEP. Exercises were reviewed and Sidra was able to demonstrate them prior to the end of the session.  Sidra demonstrated fair  understanding of the education provided. See Electronic Medical Record under Patient Instructions for exercises provided during therapy sessions    Assessment     Sidra is a 38 y.o. female referred to outpatient Physical Therapy with a medical diagnosis of thoracic pain and lumbar radiculopathy. Patient presents with thoracic and low back pain, R LE radiating symptoms, core and LE muscle weakness, tissue tightness, and impaired motor control. Patient's impairments have begun to limit her overall activity tolerance more. LPTA provided pt with proper setup on NuStep to promote musculoskeletal warm-up prior to exercises. Pt transitioned through exercises with careful intention at beginning of tx but improved with transitions and flexibility as tx progressed.  Pt had no reports of increased pain or adverse effects from tx tasks.       Patient prognosis is Good.     Patient will continue to benefit from skilled outpatient physical therapy to address the deficits listed in the problem list box on initial evaluation, provide pt/family education and to maximize pt's level of independence in the home and community environment.     Patient's spiritual, cultural and educational needs considered and pt agreeable to plan of care and goals.     Anticipated barriers to  physical therapy: chronicity of pain, scoliosis, compliance with Home exercise program and treatment     Goals:   Short Term Goals: 2 weeks   Patient will independently complete Home exercise program with correct form.   Patient will report decreased resting pain to less than or equal to 2/10 for improved quality of life.      Long Term Goals: 4 weeks   Patient will report decrease in pain to less than or equal to 4/10 when standing greater than 30 minutes to increase activity tolerance.   Patient will report decrease in radiating occurrences from 40% of the time  to less than or equal to 15% of the time to allow patient the ability to perform activities of daily living   Patient will increase B lower extremity strength to 4+/5 to improve ambulation ability  Patient will lift 10 pounds from the floor with and carry it 50 feet with reports of less than or equal 4/10 low back pain for increased tolerance to ADLs.     Plan     Plan of care Certification: 1/3/2024 to 2/2/2024.  Outpatient Physical Therapy 2 times weekly for 4 weeks to include the following interventions: Electrical Stimulation unattended, Manual Therapy, Moist Heat/ Ice, Patient Education, Therapeutic Activities, Therapeutic Exercise, and Ultrasound.     Continue Plan of Care per PT order to progress patient toward rehab goals as tolerated by patient.   MONICA Erazo  1/18/2024

## 2024-01-19 ENCOUNTER — CLINICAL SUPPORT (OUTPATIENT)
Dept: REHABILITATION | Facility: HOSPITAL | Age: 39
End: 2024-01-19
Payer: MEDICAID

## 2024-01-19 DIAGNOSIS — G89.29 CHRONIC BILATERAL LOW BACK PAIN WITH RIGHT-SIDED SCIATICA: Primary | ICD-10-CM

## 2024-01-19 DIAGNOSIS — M54.6 CHRONIC BILATERAL THORACIC BACK PAIN: ICD-10-CM

## 2024-01-19 DIAGNOSIS — G89.29 CHRONIC BILATERAL THORACIC BACK PAIN: ICD-10-CM

## 2024-01-19 DIAGNOSIS — M54.41 CHRONIC BILATERAL LOW BACK PAIN WITH RIGHT-SIDED SCIATICA: Primary | ICD-10-CM

## 2024-01-19 PROCEDURE — 97140 MANUAL THERAPY 1/> REGIONS: CPT

## 2024-01-19 PROCEDURE — 97110 THERAPEUTIC EXERCISES: CPT

## 2024-01-19 PROCEDURE — 97014 ELECTRIC STIMULATION THERAPY: CPT

## 2024-01-19 NOTE — PROGRESS NOTES
"[facility]   Physical Therapy Treatment Note      Name: Sidra Winter  Clinic Number: [id number]    Therapy Diagnosis:        Encounter Diagnoses   Name Primary?    Thoracic back pain, unspecified back pain laterality, unspecified chronicity      Lumbar radiculopathy      Chronic bilateral low back pain with right-sided sciatica Yes    Chronic bilateral thoracic back pain          Physician: Mingo Jiménez MD     Physician Orders: PT Eval and Treat   Medical Diagnosis from Referral: thoracic pain   Evaluation Date: 1/3/2024  Authorization Period Expiration: 12/10/2024  Plan of Care Expiration: 2/2/2024   Progress Note Due: 2/2/2024  Date of Surgery: NA   Visit # / Visits authorized: 6/8   FOTO: Needs FOTO at 8th visit      Precautions: Standard      Time In: 12:30   Time Out: 13:33  Total Billable Time: 63  minutes     Visit Date: 1/19/2024      PTA Visit #: 1/5   Subjective   Patient reports: "I'm still hurting pretty good. This L side I can feel a knot."   .  She was compliant with home exercise program.  Response to previous treatment: Decreased pain   Functional change: Early in Plan of Care     Pain: 5/10  Location: bilateral low back      Objective      Objective Measures updated at progress report unless specified.     Treatment     Sidra received the treatments listed below:      therapeutic exercises to develop strength, flexibility, posture, and core stabilization for 48 minutes including: See flowsheet below      Ther-Ex Reps    Nustep 6 minutes   Wedge 2 minutes    Hamstring Stretch 2 x 30" each    Posterior Pelvic Tilts 30 x 3"    Lower Trunk Rotations 5 x 10" each    Single Knee to Chest Stretch 5 x 10 " Each    Piriformis Stretch 2 x 20" each    Figure 4 Stretch  2 x 20" each    Open Books  5 x 10" each    Latissimus/ QL Stretch  2 x 20" each side                                                 Manual therapy for 8 minutes : PT completed active neuromuscular " releases to L quadratus lumborum and glute medius muslces to decrease tissue tightness, irritation, and pain.     supervised modalities: after being cleared for contradictions: Biphasic ESTIM:  Sidra received  electrical stimulation for pain and tissue tightness to the B thoracic paraspinals and B piriformis muscles . Pt received burst mode at a rate of 2 pps for 10 minutes. Sidra tolerated treatment well without any adverse effects.      hot pack for 10 minutes to B thoracic, lumbar and sacral muscles with ESTIM.  Patient Education and Home Exercises       Education provided:   - Plan of Care, Home exercise program, Delayed onset muscle soreness     Written Home Exercises Provided: Patient instructed to cont prior HEP. Exercises were reviewed and Sidra was able to demonstrate them prior to the end of the session.  Sidra demonstrated fair  understanding of the education provided. See Electronic Medical Record under Patient Instructions for exercises provided during therapy sessions    Assessment     Sidra is a 38 y.o. female referred to outpatient Physical Therapy with a medical diagnosis of thoracic pain and lumbar radiculopathy. Patient presents with thoracic and low back pain, R LE radiating symptoms, core and LE muscle weakness, tissue tightness, and impaired motor control. Patient's impairments have begun to limit her overall activity tolerance more. PT  provided pt with proper setup on NuStep to promote musculoskeletal warm-up prior to exercises. Pt progressed through treatment with minimal cueing for hold times and reps. Patient had no reports of increased pain during exercises. PT noted improved tissue extensibility following manual interventions. Patient had no reports of adverse effects during treatment.     Patient prognosis is Good.     Patient will continue to benefit from skilled outpatient physical therapy to address the deficits listed in the problem list box on initial  evaluation, provide pt/family education and to maximize pt's level of independence in the home and community environment.     Patient's spiritual, cultural and educational needs considered and pt agreeable to plan of care and goals.     Anticipated barriers to physical therapy: chronicity of pain, scoliosis, compliance with Home exercise program and treatment     Goals:   Short Term Goals: 2 weeks   Patient will independently complete Home exercise program with correct form.   Patient will report decreased resting pain to less than or equal to 2/10 for improved quality of life.      Long Term Goals: 4 weeks   Patient will report decrease in pain to less than or equal to 4/10 when standing greater than 30 minutes to increase activity tolerance.   Patient will report decrease in radiating occurrences from 40% of the time  to less than or equal to 15% of the time to allow patient the ability to perform activities of daily living   Patient will increase B lower extremity strength to 4+/5 to improve ambulation ability  Patient will lift 10 pounds from the floor with and carry it 50 feet with reports of less than or equal 4/10 low back pain for increased tolerance to ADLs.     Plan     Plan of care Certification: 1/3/2024 to 2/2/2024.  Outpatient Physical Therapy 2 times weekly for 4 weeks to include the following interventions: Electrical Stimulation unattended, Manual Therapy, Moist Heat/ Ice, Patient Education, Therapeutic Activities, Therapeutic Exercise, and Ultrasound.     Continue Plan of Care per PT order to progress patient toward rehab goals as tolerated by patient.   Marco Antonio Pollard, PT, DPT     1/19/2024

## 2024-01-23 ENCOUNTER — CLINICAL SUPPORT (OUTPATIENT)
Dept: REHABILITATION | Facility: HOSPITAL | Age: 39
End: 2024-01-23
Payer: MEDICAID

## 2024-01-23 DIAGNOSIS — G89.29 CHRONIC BILATERAL THORACIC BACK PAIN: ICD-10-CM

## 2024-01-23 DIAGNOSIS — M54.6 CHRONIC BILATERAL THORACIC BACK PAIN: ICD-10-CM

## 2024-01-23 DIAGNOSIS — M54.41 CHRONIC BILATERAL LOW BACK PAIN WITH RIGHT-SIDED SCIATICA: Primary | ICD-10-CM

## 2024-01-23 DIAGNOSIS — G89.29 CHRONIC BILATERAL LOW BACK PAIN WITH RIGHT-SIDED SCIATICA: Primary | ICD-10-CM

## 2024-01-23 PROCEDURE — 97014 ELECTRIC STIMULATION THERAPY: CPT | Mod: CQ

## 2024-01-23 PROCEDURE — 97110 THERAPEUTIC EXERCISES: CPT | Mod: CQ

## 2024-01-23 NOTE — PROGRESS NOTES
"[facility]   Physical Therapy Treatment Note      Name: Sidra Winter  Clinic Number: [id number]    Therapy Diagnosis:        Encounter Diagnoses   Name Primary?    Thoracic back pain, unspecified back pain laterality, unspecified chronicity      Lumbar radiculopathy      Chronic bilateral low back pain with right-sided sciatica Yes    Chronic bilateral thoracic back pain          Physician: Mingo Jiménez MD     Physician Orders: PT Eval and Treat   Medical Diagnosis from Referral: thoracic pain   Evaluation Date: 1/3/2024  Authorization Period Expiration: 12/10/2024  Plan of Care Expiration: 2/2/2024   Progress Note Due: 2/2/2024  Date of Surgery: NA   Visit # / Visits authorized: 7/8   FOTO: Needs FOTO at 8th visit      Precautions: Standard      Time In: 11:00  Time Out: 11:40  Total Billable Time: 40 minutes     Visit Date: 1/23/2024      PTA Visit #: 1/5  Subjective   Patient reports: "I'm feeling a little bit better".   .  She was compliant with home exercise program.  Response to previous treatment: Decreased pain   Functional change: Early in Plan of Care     Pain: 2/10  Location: bilateral low back      Objective      Objective Measures updated at progress report unless specified.     Treatment     Sidra received the treatments listed below:      therapeutic exercises to develop strength, flexibility, posture, and core stabilization for 48 minutes including: See flowsheet below      Ther-Ex Reps    Nustep 6 minutes   Wedge 2 minutes    Hamstring Stretch 2 x 30" each    Posterior Pelvic Tilts 30 x 3"    Lower Trunk Rotations 5 x 10" each    Single Knee to Chest Stretch 5 x 10 " Each    Piriformis Stretch 2 x 20" each    Figure 4 Stretch  2 x 20" each    Open Books  5 x 10" each    Latissimus/ QL Stretch  2 x 20" each side                                                   supervised modalities: after being cleared for contradictions: Biphasic ESTIM:  Sidra received  " electrical stimulation for pain and tissue tightness to the B thoracic paraspinals and B piriformis muscles . Pt received burst mode at a rate of 2 pps for 10 minutes. Sidra tolerated treatment well without any adverse effects.      hot pack for 10 minutes to B thoracic, lumbar and sacral muscles with ESTIM.  Patient Education and Home Exercises       Education provided:   - Plan of Care, Home exercise program, Delayed onset muscle soreness     Written Home Exercises Provided: Patient instructed to cont prior HEP. Exercises were reviewed and Sidra was able to demonstrate them prior to the end of the session.  Sidra demonstrated fair  understanding of the education provided. See Electronic Medical Record under Patient Instructions for exercises provided during therapy sessions    Assessment     Sidra is a 38 y.o. female referred to outpatient Physical Therapy with a medical diagnosis of thoracic pain and lumbar radiculopathy. Patient presents with thoracic and low back pain, R LE radiating symptoms, core and LE muscle weakness, tissue tightness, and impaired motor control. Patient's impairments have begun to limit her overall activity tolerance more. LPTA  provided pt with proper setup on NuStep to promote musculoskeletal warm-up prior to exercises. Pt progressed through treatment with minimal cueing for hold times and reps.  Patient denies increased pain during physical therapy treatment session.  No adverse effects noted or reported.       Patient prognosis is Good.     Patient will continue to benefit from skilled outpatient physical therapy to address the deficits listed in the problem list box on initial evaluation, provide pt/family education and to maximize pt's level of independence in the home and community environment.     Patient's spiritual, cultural and educational needs considered and pt agreeable to plan of care and goals.     Anticipated barriers to physical therapy: chronicity of  pain, scoliosis, compliance with Home exercise program and treatment     Goals:   Short Term Goals: 2 weeks   Patient will independently complete Home exercise program with correct form.   Patient will report decreased resting pain to less than or equal to 2/10 for improved quality of life.      Long Term Goals: 4 weeks   Patient will report decrease in pain to less than or equal to 4/10 when standing greater than 30 minutes to increase activity tolerance.   Patient will report decrease in radiating occurrences from 40% of the time  to less than or equal to 15% of the time to allow patient the ability to perform activities of daily living   Patient will increase B lower extremity strength to 4+/5 to improve ambulation ability  Patient will lift 10 pounds from the floor with and carry it 50 feet with reports of less than or equal 4/10 low back pain for increased tolerance to ADLs.     Plan     Plan of care Certification: 1/3/2024 to 2/2/2024.  Outpatient Physical Therapy 2 times weekly for 4 weeks to include the following interventions: Electrical Stimulation unattended, Manual Therapy, Moist Heat/ Ice, Patient Education, Therapeutic Activities, Therapeutic Exercise, and Ultrasound.     Continue Plan of Care per PT order to progress patient toward rehab goals as tolerated by patient.   MONICA Daniels     1/23/2024

## 2024-01-25 ENCOUNTER — CLINICAL SUPPORT (OUTPATIENT)
Dept: REHABILITATION | Facility: HOSPITAL | Age: 39
End: 2024-01-25
Payer: MEDICAID

## 2024-01-25 DIAGNOSIS — M54.41 CHRONIC BILATERAL LOW BACK PAIN WITH RIGHT-SIDED SCIATICA: Primary | ICD-10-CM

## 2024-01-25 DIAGNOSIS — M54.6 CHRONIC BILATERAL THORACIC BACK PAIN: ICD-10-CM

## 2024-01-25 DIAGNOSIS — G89.29 CHRONIC BILATERAL LOW BACK PAIN WITH RIGHT-SIDED SCIATICA: Primary | ICD-10-CM

## 2024-01-25 DIAGNOSIS — G89.29 CHRONIC BILATERAL THORACIC BACK PAIN: ICD-10-CM

## 2024-01-25 PROCEDURE — 97014 ELECTRIC STIMULATION THERAPY: CPT | Mod: CQ

## 2024-01-25 PROCEDURE — 97110 THERAPEUTIC EXERCISES: CPT | Mod: CQ

## 2024-01-25 NOTE — PROGRESS NOTES
"[facility]   Physical Therapy Treatment Note      Name: Sidra Winter  Clinic Number: [id number]    Therapy Diagnosis:        Encounter Diagnoses   Name Primary?    Thoracic back pain, unspecified back pain laterality, unspecified chronicity      Lumbar radiculopathy      Chronic bilateral low back pain with right-sided sciatica Yes    Chronic bilateral thoracic back pain          Physician: Mingo Jiménez MD     Physician Orders: PT Eval and Treat   Medical Diagnosis from Referral: thoracic pain   Evaluation Date: 1/3/2024  Authorization Period Expiration: 12/10/2024  Plan of Care Expiration: 2/2/2024   Progress Note Due: 2/2/2024  Date of Surgery: NA   Visit # / Visits authorized: 8/8   FOTO: Needs FOTO at 8th visit      Precautions: Standard      Time In: 11:02  Time Out: 11:45  Total Billable Time: 43 minutes     Visit Date: 1/25/2024      PTA Visit #: 2/5  Subjective   Patient reports: "I'm still having some pain but it's not quiet as bad as it has been".   .  She was compliant with home exercise program.  Response to previous treatment: Decreased pain   Functional change: Early in Plan of Care     Pain: 2/10  Location: bilateral low back      Objective      Objective Measures updated at progress report unless specified.     Treatment     Sidra received the treatments listed below:      therapeutic exercises to develop strength, flexibility, posture, and core stabilization.  See flowsheet below      Ther-Ex Reps    Nustep 8 minutes   Wedge 2 minutes    Hamstring Stretch 2 x 30" each    Posterior Pelvic Tilts 30 x 3"    Lower Trunk Rotations 5 x 10" each    Single Knee to Chest Stretch 5 x 10 " Each    Piriformis Stretch 2 x 20" each    Figure 4 Stretch  2 x 20" each    Open Books  5 x 10" each    Latissimus/ QL Stretch  2 x 20" each side                                                   supervised modalities: after being cleared for contradictions: Biphasic ESTIM:  " Sidra received  electrical stimulation for pain and tissue tightness to the B thoracic paraspinals and B piriformis muscles . Pt received burst mode at a rate of 2 pps for 10 minutes. Sidra tolerated treatment well without any adverse effects.      hot pack for 10 minutes to B thoracic, lumbar and sacral muscles with ESTIM.  Patient Education and Home Exercises       Education provided:   - Plan of Care, Home exercise program, Delayed onset muscle soreness     Written Home Exercises Provided: Patient instructed to cont prior HEP. Exercises were reviewed and Sidra was able to demonstrate them prior to the end of the session.  Sidra demonstrated fair  understanding of the education provided. See Electronic Medical Record under Patient Instructions for exercises provided during therapy sessions    Assessment     Sidra is a 38 y.o. female referred to outpatient Physical Therapy with a medical diagnosis of thoracic pain and lumbar radiculopathy. Patient presents with thoracic and low back pain, R LE radiating symptoms, core and LE muscle weakness, tissue tightness, and impaired motor control. Patient's impairments have begun to limit her overall activity tolerance more. LPTA  provided pt with proper setup on NuStep to promote musculoskeletal warm-up prior to exercises. Pt progressed through treatment with minimal cueing for hold times and reps.  Patient goals assessed by Marco Antonio Pollard DPT for possible discharge from physical therapy services, but after goals assessment and discussion with patient plan to continue physical therapy services.        Patient prognosis is Good.     Patient will continue to benefit from skilled outpatient physical therapy to address the deficits listed in the problem list box on initial evaluation, provide pt/family education and to maximize pt's level of independence in the home and community environment.     Patient's spiritual, cultural and educational needs considered  and pt agreeable to plan of care and goals.     Anticipated barriers to physical therapy: chronicity of pain, scoliosis, compliance with Home exercise program and treatment     Goals:   Short Term Goals: 2 weeks   Patient will independently complete Home exercise program with correct form.   Patient will report decreased resting pain to less than or equal to 2/10 for improved quality of life.      Long Term Goals: 4 weeks   Patient will report decrease in pain to less than or equal to 4/10 when standing greater than 30 minutes to increase activity tolerance.   Patient will report decrease in radiating occurrences from 40% of the time  to less than or equal to 15% of the time to allow patient the ability to perform activities of daily living   Patient will increase B lower extremity strength to 4+/5 to improve ambulation ability  Patient will lift 10 pounds from the floor with and carry it 50 feet with reports of less than or equal 4/10 low back pain for increased tolerance to ADLs.     Plan     Plan of care Certification: 1/3/2024 to 2/2/2024.  Outpatient Physical Therapy 2 times weekly for 4 weeks to include the following interventions: Electrical Stimulation unattended, Manual Therapy, Moist Heat/ Ice, Patient Education, Therapeutic Activities, Therapeutic Exercise, and Ultrasound.     Plan of care Re-certification per PT order to progress patient toward rehab goals.   MONICA Daniels     1/25/2024

## 2024-01-26 NOTE — PLAN OF CARE
"[facility]  Physical Therapy Plan of Care Note     Name: Sidra Winter  Clinic Number: [id number]    Therapy Diagnosis:   Encounter Diagnoses   Name Primary?    Chronic bilateral low back pain with right-sided sciatica Yes    Chronic bilateral thoracic back pain      Physician: Mingo Jiménez MD    Visit Date: 1/25/2024    Physician Orders: PT Eval and Treat   Medical Diagnosis from Referral: thoracic pain   Evaluation Date: 1/3/2024  Authorization Period Expiration: 12/10/2024  Plan of Care Expiration: 2/2/2024   Progress Note Due: 2/2/2024  Date of Surgery: NA   Visit # / Visits authorized: 8/16  FOTO: Needs FOTO at 12th visit    Precautions: Standard  Functional Level Prior to Evaluation:  Independent     Subjective     Update: "I have good days and bad days. I usually feel really good on the days that I come to therapy. I think I hurt more some days because of my activity level at home."    Objective      Update: Patient demonstrates improving flexibility and strength     Assessment     Update: Patient kannan benefit from continued skilled PT services to continue progressing toward rehab goals in order to increase activity tolerance with less pain for improved quality of life.     Previous Short Term Goals Status:     Patient will independently complete Home exercise program with correct form.   Patient will report decreased resting pain to less than or equal to 2/10 for improved quality of life.  Long Term Goal Status: continue per initial plan of care.  Reasons for Recertification of Therapy:  continue progressing toward rehab goals.     GOALS  Patient will report decrease in pain to less than or equal to 4/10 when standing greater than 30 minutes to increase activity tolerance. -Goal in progress  Patient will report decrease in radiating occurrences from 40% of the time  to less than or equal to 15% of the time to allow patient the ability to perform activities of daily living " -Goal in progress  Patient will increase B lower extremity strength to 4+/5 to improve ambulation ability-Goal in progress  Patient will lift 10 pounds from the floor with and carry it 50 feet with reports of less than or equal 4/10 low back pain for increased tolerance to ADLs. -Goal in progress     Plan     Updated Certification Period: 1/25/2024 to 2/23/2024   Recommended Treatment Plan: 2 times per week for 4 weeks:  Electrical Stimulation unattended, Manual Therapy, Moist Heat/ Ice, Patient Education, Therapeutic Activities, Therapeutic Exercise, and Ultrasound    Marco Antonio Pollard, PT, DPT

## 2024-01-30 ENCOUNTER — CLINICAL SUPPORT (OUTPATIENT)
Dept: REHABILITATION | Facility: HOSPITAL | Age: 39
End: 2024-01-30
Payer: MEDICAID

## 2024-01-30 DIAGNOSIS — M54.41 CHRONIC BILATERAL LOW BACK PAIN WITH RIGHT-SIDED SCIATICA: Primary | ICD-10-CM

## 2024-01-30 DIAGNOSIS — M54.6 CHRONIC BILATERAL THORACIC BACK PAIN: ICD-10-CM

## 2024-01-30 DIAGNOSIS — G89.29 CHRONIC BILATERAL LOW BACK PAIN WITH RIGHT-SIDED SCIATICA: Primary | ICD-10-CM

## 2024-01-30 DIAGNOSIS — G89.29 CHRONIC BILATERAL THORACIC BACK PAIN: ICD-10-CM

## 2024-01-30 PROCEDURE — 97014 ELECTRIC STIMULATION THERAPY: CPT | Mod: CQ

## 2024-01-30 PROCEDURE — 97110 THERAPEUTIC EXERCISES: CPT | Mod: CQ

## 2024-01-30 PROCEDURE — 97140 MANUAL THERAPY 1/> REGIONS: CPT | Mod: CQ

## 2024-01-31 NOTE — PROGRESS NOTES
"[facility]   Physical Therapy Treatment Note      Name: Sidra Winter  Clinic Number: [id number]    Therapy Diagnosis:        Encounter Diagnoses   Name Primary?    Thoracic back pain, unspecified back pain laterality, unspecified chronicity      Lumbar radiculopathy      Chronic bilateral low back pain with right-sided sciatica Yes    Chronic bilateral thoracic back pain          Physician: Mingo Jiménez MD     Physician Orders: PT Eval and Treat   Medical Diagnosis from Referral: thoracic pain   Evaluation Date: 1/3/2024  Authorization Period Expiration: 12/10/2024  Plan of Care Expiration: 2/2/2024   Progress Note Due: 2/2/2024  Date of Surgery: NA   Visit # / Visits authorized: 8/8   FOTO: Needs FOTO at 8th visit      Precautions: Standard      Time In: 11:00  Time Out: 11:42  Total Billable Time: 42 minutes     Visit Date: 1/30/2024      PTA Visit #: 2/5  Subjective   Patient reports: "I'm hurting a little more today".   .  She was compliant with home exercise program.  Response to previous treatment: Decreased pain   Functional change: Early in Plan of Care     Pain: 5/10  Location: bilateral low back      Objective      Objective Measures updated at progress report unless specified.     Treatment     Sidra received the treatments listed below:      therapeutic exercises to develop strength, flexibility, posture, and core stabilization.  See flowsheet below      Ther-Ex Reps    Nustep 8 minutes   Wedge 2 minutes    Hamstring Stretch 2 x 30" each    Posterior Pelvic Tilts 30 x 3"    Lower Trunk Rotations 5 x 10" each    Single Knee to Chest Stretch 5 x 10 " Each    Piriformis Stretch 2 x 20" each    Figure 4 Stretch  2 x 20" each    Open Books  5 x 10" each    Latissimus/ QL Stretch  2 x 20" each side                                                 Manual therapy:  STM/MFR including ANR's to correct Left pelvic up-slip and decrease soft tissue tightness and trigger points " "in Left and Right Quadratus Lumborum, Left Glut Min, Left Glut Med, and Left Piriformis.     supervised modalities: after being cleared for contradictions: Biphasic ESTIM:  Sidra received  electrical stimulation for pain and tissue tightness to the B thoracic paraspinals and B piriformis muscles . Pt received burst mode at a rate of 2 pps for 10 minutes. Sidra tolerated treatment well without any adverse effects.      hot pack for 10 minutes to B thoracic, lumbar and sacral muscles with ESTIM.  Patient Education and Home Exercises       Education provided:   - Plan of Care, Home exercise program, Delayed onset muscle soreness     Written Home Exercises Provided: Patient instructed to cont prior HEP. Exercises were reviewed and Sidra was able to demonstrate them prior to the end of the session.  Sidra demonstrated fair  understanding of the education provided. See Electronic Medical Record under Patient Instructions for exercises provided during therapy sessions    Assessment     Sidra is a 38 y.o. female referred to outpatient Physical Therapy with a medical diagnosis of thoracic pain and lumbar radiculopathy. Patient presents with thoracic and low back pain, R LE radiating symptoms, core and LE muscle weakness, tissue tightness, and impaired motor control. Patient's impairments have begun to limit her overall activity tolerance more. LPTA  provided pt with proper setup on NuStep to promote musculoskeletal warm-up prior to exercises. Pt progressed through treatment with minimal cueing for hold times and reps.  Patient reports feeling increased discomfort during manual therapy application, but states "I feel better. I'm mostly sore now" at end of physical therapy treatment session.  No adverse affects noted during treatment session.     Patient prognosis is Good.     Patient will continue to benefit from skilled outpatient physical therapy to address the deficits listed in the problem list box on " initial evaluation, provide pt/family education and to maximize pt's level of independence in the home and community environment.     Patient's spiritual, cultural and educational needs considered and pt agreeable to plan of care and goals.     Anticipated barriers to physical therapy: chronicity of pain, scoliosis, compliance with Home exercise program and treatment     Goals:   Short Term Goals: 2 weeks   Patient will independently complete Home exercise program with correct form.   Patient will report decreased resting pain to less than or equal to 2/10 for improved quality of life.      Long Term Goals: 4 weeks   Patient will report decrease in pain to less than or equal to 4/10 when standing greater than 30 minutes to increase activity tolerance.   Patient will report decrease in radiating occurrences from 40% of the time  to less than or equal to 15% of the time to allow patient the ability to perform activities of daily living   Patient will increase B lower extremity strength to 4+/5 to improve ambulation ability  Patient will lift 10 pounds from the floor with and carry it 50 feet with reports of less than or equal 4/10 low back pain for increased tolerance to ADLs.     Plan     Plan of care Certification: 1/3/2024 to 2/2/2024.  Outpatient Physical Therapy 2 times weekly for 4 weeks to include the following interventions: Electrical Stimulation unattended, Manual Therapy, Moist Heat/ Ice, Patient Education, Therapeutic Activities, Therapeutic Exercise, and Ultrasound.     Plan of care Re-certification per PT order to progress patient toward rehab goals.   MONICA Daniels     1/31/2024

## 2024-02-01 ENCOUNTER — CLINICAL SUPPORT (OUTPATIENT)
Dept: REHABILITATION | Facility: HOSPITAL | Age: 39
End: 2024-02-01
Payer: MEDICAID

## 2024-02-01 DIAGNOSIS — M54.6 CHRONIC BILATERAL THORACIC BACK PAIN: ICD-10-CM

## 2024-02-01 DIAGNOSIS — M54.41 CHRONIC BILATERAL LOW BACK PAIN WITH RIGHT-SIDED SCIATICA: Primary | ICD-10-CM

## 2024-02-01 DIAGNOSIS — G89.29 CHRONIC BILATERAL LOW BACK PAIN WITH RIGHT-SIDED SCIATICA: Primary | ICD-10-CM

## 2024-02-01 DIAGNOSIS — G89.29 CHRONIC BILATERAL THORACIC BACK PAIN: ICD-10-CM

## 2024-02-01 PROCEDURE — 97110 THERAPEUTIC EXERCISES: CPT | Mod: CQ

## 2024-02-01 PROCEDURE — 97014 ELECTRIC STIMULATION THERAPY: CPT | Mod: CQ

## 2024-02-01 NOTE — PROGRESS NOTES
"[facility]   Physical Therapy Treatment Note      Name: Sidra Winter  Clinic Number: [id number]    Therapy Diagnosis:        Encounter Diagnoses   Name Primary?    Thoracic back pain, unspecified back pain laterality, unspecified chronicity      Lumbar radiculopathy      Chronic bilateral low back pain with right-sided sciatica Yes    Chronic bilateral thoracic back pain          Physician: Mingo Jiménez MD     Physician Orders: PT Eval and Treat   Medical Diagnosis from Referral: thoracic pain   Evaluation Date: 1/3/2024  Authorization Period Expiration: 12/10/2024  Plan of Care Expiration: 2/22/2024   Progress Note Due: 2/22/2024  Date of Surgery: NA   Visit # / Visits authorized: 10/16  FOTO: Needs FOTO at 12th visit      Precautions: Standard      Time In: 11:00  Time Out: 11:45  Total Billable Time: 45 minutes     Visit Date: 2/1/2024      PTA Visit #: 2/5  Subjective   Patient reports: "I'm hurting but I think it's because of my cycle".   .  She was compliant with home exercise program.  Response to previous treatment: Decreased pain   Functional change: Early in Plan of Care     Pain: 5/10  Location: bilateral low back      Objective      Objective Measures updated at progress report unless specified.     Treatment     Sidra received the treatments listed below:      therapeutic exercises to develop strength, flexibility, posture, and core stabilization.  See flowsheet below      Ther-Ex Reps    Nustep 8 minutes   Wedge 2 minutes    Hamstring Stretch 2 x 30" each    Posterior Pelvic Tilts 30 x 3"    Lower Trunk Rotations 5 x 10" each    Single Knee to Chest Stretch 5 x 10 " Each    Piriformis Stretch 2 x 20" each    Figure 4 Stretch  2 x 20" each    Open Books  5 x 10" each    Latissimus/ QL Stretch  2 x 20" each side                                                 NOT COMPLETED---Manual therapy:  STM/MFR including ANR's to correct Left pelvic up-slip and decrease soft " tissue tightness and trigger points in Left and Right Quadratus Lumborum, Left Glut Min, Left Glut Med, and Left Piriformis.     supervised modalities: after being cleared for contradictions: Biphasic ESTIM:  Sidra received  electrical stimulation for pain and tissue tightness to the B thoracic paraspinals and B piriformis muscles . Pt received burst mode at a rate of 2 pps for 10 minutes. Sidra tolerated treatment well without any adverse effects.      hot pack for 10 minutes to B thoracic, lumbar and sacral muscles with ESTIM.  Patient Education and Home Exercises       Education provided:   - Plan of Care, Home exercise program, Delayed onset muscle soreness     Written Home Exercises Provided: Patient instructed to cont prior HEP. Exercises were reviewed and Sidra was able to demonstrate them prior to the end of the session.  Sidra demonstrated fair  understanding of the education provided. See Electronic Medical Record under Patient Instructions for exercises provided during therapy sessions    Assessment     Sidra is a 38 y.o. female referred to outpatient Physical Therapy with a medical diagnosis of thoracic pain and lumbar radiculopathy. Patient presents with thoracic and low back pain, R LE radiating symptoms, core and LE muscle weakness, tissue tightness, and impaired motor control. Patient's impairments have begun to limit her overall activity tolerance more. LPTA  provided pt with proper setup on NuStep to promote musculoskeletal warm-up prior to exercises. Pt progressed through treatment with minimal cueing required for hold times and reps.  Pt transitions smoothly between exercises despite initial c/o pain. No pelvic malalignment noted following assessment. No adverse affects noted from tx.     Patient prognosis is Good.     Patient will continue to benefit from skilled outpatient physical therapy to address the deficits listed in the problem list box on initial evaluation, provide  pt/family education and to maximize pt's level of independence in the home and community environment.     Patient's spiritual, cultural and educational needs considered and pt agreeable to plan of care and goals.     Anticipated barriers to physical therapy: chronicity of pain, scoliosis, compliance with Home exercise program and treatment     Goals:   Short Term Goals: 2 weeks   Patient will independently complete Home exercise program with correct form.   Patient will report decreased resting pain to less than or equal to 2/10 for improved quality of life.      Long Term Goals: 4 weeks   Patient will report decrease in pain to less than or equal to 4/10 when standing greater than 30 minutes to increase activity tolerance.   Patient will report decrease in radiating occurrences from 40% of the time  to less than or equal to 15% of the time to allow patient the ability to perform activities of daily living   Patient will increase B lower extremity strength to 4+/5 to improve ambulation ability  Patient will lift 10 pounds from the floor with and carry it 50 feet with reports of less than or equal 4/10 low back pain for increased tolerance to ADLs.     Plan     Plan of care Certification: 1/3/2024 to 2/22/2024.  Outpatient Physical Therapy 2 times weekly for 4 weeks to include the following interventions: Electrical Stimulation unattended, Manual Therapy, Moist Heat/ Ice, Patient Education, Therapeutic Activities, Therapeutic Exercise, and Ultrasound.     Plan of care Re-certification per PT order to progress patient toward rehab goals.   MONICA Erazo   2/1/2024

## 2024-02-05 ENCOUNTER — CLINICAL SUPPORT (OUTPATIENT)
Dept: REHABILITATION | Facility: HOSPITAL | Age: 39
End: 2024-02-05
Payer: MEDICAID

## 2024-02-05 DIAGNOSIS — M54.41 CHRONIC BILATERAL LOW BACK PAIN WITH RIGHT-SIDED SCIATICA: Primary | ICD-10-CM

## 2024-02-05 DIAGNOSIS — M54.6 CHRONIC BILATERAL THORACIC BACK PAIN: ICD-10-CM

## 2024-02-05 DIAGNOSIS — G89.29 CHRONIC BILATERAL LOW BACK PAIN WITH RIGHT-SIDED SCIATICA: Primary | ICD-10-CM

## 2024-02-05 DIAGNOSIS — G89.29 CHRONIC BILATERAL THORACIC BACK PAIN: ICD-10-CM

## 2024-02-05 PROCEDURE — 97010 HOT OR COLD PACKS THERAPY: CPT | Mod: CQ

## 2024-02-05 PROCEDURE — 97014 ELECTRIC STIMULATION THERAPY: CPT | Mod: CQ

## 2024-02-05 PROCEDURE — 97110 THERAPEUTIC EXERCISES: CPT | Mod: CQ

## 2024-02-05 NOTE — PROGRESS NOTES
"[facility]   Physical Therapy Treatment Note      Name: Sidra Winter  Clinic Number: [id number]    Therapy Diagnosis:        Encounter Diagnoses   Name Primary?    Thoracic back pain, unspecified back pain laterality, unspecified chronicity      Lumbar radiculopathy      Chronic bilateral low back pain with right-sided sciatica Yes    Chronic bilateral thoracic back pain          Physician: Mingo Jiménez MD     Physician Orders: PT Eval and Treat   Medical Diagnosis from Referral: thoracic pain   Evaluation Date: 1/3/2024  Authorization Period Expiration: 12/10/2024  Plan of Care Expiration: 2/22/2024   Progress Note Due: 2/22/2024  Date of Surgery: NA   Visit # / Visits authorized: 11/16  FOTO: Needs FOTO at 12th visit      Precautions: Standard      Time In: 11:00  Time Out: 11:46  Total Billable Time: 46 minutes     Visit Date: 2/5/2024      PTA Visit #: 2/5  Subjective   Patient reports: "I'm not hurting much at all today".   .  She was compliant with home exercise program.  Response to previous treatment: Decreased pain   Functional change: Early in Plan of Care     Pain: 1/10  Location: bilateral low back      Objective      Objective Measures updated at progress report unless specified.     Treatment     Sidra received the treatments listed below:      therapeutic exercises to develop strength, flexibility, posture, and core stabilization.  See flowsheet below      Ther-Ex Reps    Nustep 8 minutes   Wedge 2 minutes    Hamstring Stretch 2 x 30" each    Posterior Pelvic Tilts 30 x 3"    Lower Trunk Rotations 5 x 10" each    Single Knee to Chest Stretch 5 x 10 " Each    Piriformis Stretch 2 x 20" each    Figure 4 Stretch  2 x 20" each    Open Books  5 x 10" each    Latissimus/ QL Stretch  2 x 20" each side                                                 NOT COMPLETED---Manual therapy:  STM/MFR including ANR's to correct Left pelvic up-slip and decrease soft tissue " tightness and trigger points in Left and Right Quadratus Lumborum, Left Glut Min, Left Glut Med, and Left Piriformis.     supervised modalities: after being cleared for contradictions: Biphasic ESTIM:  Sidra received  electrical stimulation for pain and tissue tightness to the B thoracic paraspinals and B piriformis muscles . Pt received burst mode at a rate of 2 pps for 10 minutes. Sidra tolerated treatment well without any adverse effects.      hot pack for 10 minutes to B thoracic, lumbar and sacral muscles with ESTIM.  Patient Education and Home Exercises       Education provided:   - Plan of Care, Home exercise program, Delayed onset muscle soreness     Written Home Exercises Provided: Patient instructed to cont prior HEP. Exercises were reviewed and Sidra was able to demonstrate them prior to the end of the session.  Sidra demonstrated fair  understanding of the education provided. See Electronic Medical Record under Patient Instructions for exercises provided during therapy sessions    Assessment     Sidra is a 38 y.o. female referred to outpatient Physical Therapy with a medical diagnosis of thoracic pain and lumbar radiculopathy. Patient presents with thoracic and low back pain, R LE radiating symptoms, core and LE muscle weakness, tissue tightness, and impaired motor control. Patient's impairments have begun to limit her overall activity tolerance more. LPTA  provided pt with proper setup on NuStep to promote musculoskeletal warm-up prior to exercises. Pt progressed through treatment with minimal cueing required for hold times and reps. Pt continues to have smooth transitions between exercises with no increased pain. No pelvic malalignment noted following assessment. No adverse affects noted from tx.     Patient prognosis is Good.     Patient will continue to benefit from skilled outpatient physical therapy to address the deficits listed in the problem list box on initial evaluation,  provide pt/family education and to maximize pt's level of independence in the home and community environment.     Patient's spiritual, cultural and educational needs considered and pt agreeable to plan of care and goals.     Anticipated barriers to physical therapy: chronicity of pain, scoliosis, compliance with Home exercise program and treatment     Goals:   Short Term Goals: 2 weeks   Patient will independently complete Home exercise program with correct form.   Patient will report decreased resting pain to less than or equal to 2/10 for improved quality of life.      Long Term Goals: 4 weeks   Patient will report decrease in pain to less than or equal to 4/10 when standing greater than 30 minutes to increase activity tolerance.   Patient will report decrease in radiating occurrences from 40% of the time  to less than or equal to 15% of the time to allow patient the ability to perform activities of daily living   Patient will increase B lower extremity strength to 4+/5 to improve ambulation ability  Patient will lift 10 pounds from the floor with and carry it 50 feet with reports of less than or equal 4/10 low back pain for increased tolerance to ADLs.     Plan     Plan of care Certification: 1/3/2024 to 2/22/2024.  Outpatient Physical Therapy 2 times weekly for 4 weeks to include the following interventions: Electrical Stimulation unattended, Manual Therapy, Moist Heat/ Ice, Patient Education, Therapeutic Activities, Therapeutic Exercise, and Ultrasound.     Plan of care Re-certification per PT order to progress patient toward rehab goals.   MONICA Erazo   2/5/2024

## 2024-02-08 ENCOUNTER — CLINICAL SUPPORT (OUTPATIENT)
Dept: REHABILITATION | Facility: HOSPITAL | Age: 39
End: 2024-02-08
Payer: MEDICAID

## 2024-02-08 DIAGNOSIS — M54.41 CHRONIC BILATERAL LOW BACK PAIN WITH RIGHT-SIDED SCIATICA: Primary | ICD-10-CM

## 2024-02-08 DIAGNOSIS — G89.29 CHRONIC BILATERAL THORACIC BACK PAIN: ICD-10-CM

## 2024-02-08 DIAGNOSIS — G89.29 CHRONIC BILATERAL LOW BACK PAIN WITH RIGHT-SIDED SCIATICA: Primary | ICD-10-CM

## 2024-02-08 DIAGNOSIS — M54.6 CHRONIC BILATERAL THORACIC BACK PAIN: ICD-10-CM

## 2024-02-08 PROCEDURE — 97110 THERAPEUTIC EXERCISES: CPT

## 2024-02-08 PROCEDURE — 97014 ELECTRIC STIMULATION THERAPY: CPT

## 2024-02-08 NOTE — PROGRESS NOTES
"[facility]   Physical Therapy Treatment Note      Name: Sidra Winter  Clinic Number: [id number]    Therapy Diagnosis:        Encounter Diagnoses   Name Primary?    Thoracic back pain, unspecified back pain laterality, unspecified chronicity      Lumbar radiculopathy      Chronic bilateral low back pain with right-sided sciatica Yes    Chronic bilateral thoracic back pain          Physician: Mingo Jiménez MD     Physician Orders: PT Eval and Treat   Medical Diagnosis from Referral: thoracic pain   Evaluation Date: 1/3/2024  Authorization Period Expiration: 12/10/2024  Plan of Care Expiration: 2/22/2024   Progress Note Due: 2/22/2024  Date of Surgery: NA   Visit # / Visits authorized: 12/16  FOTO: Needs FOTO at 16th visit      Precautions: Standard      Time In: 12:35  Time Out: 13:28  Total Billable Time: 53 minutes     Visit Date: 2/8/2024      PTA Visit #: 0/5  Subjective   Patient reports: "I'm not hurting much at all today".   .  She was compliant with home exercise program.  Response to previous treatment: Decreased pain   Functional change: Early in Plan of Care     Pain: 1/10  Location: bilateral low back      Objective      Objective Measures updated at progress report unless specified.     Treatment     Sidra received the treatments listed below:      therapeutic exercises to develop strength, flexibility, posture, and core stabilization.  See flowsheet below      Ther-Ex Reps    Bike  8 minutes   Wedge 2 minutes    Hamstring Stretch 2 x 30" each    Posterior Pelvic Tilts 30 x 3"    Trunk Rotations on Ball  5 x 10" each *   Single Knee to Chest Stretch 5 x 10 " Each    Piriformis Stretch 2 x 20" each    Figure 4 Stretch  2 x 20" each    Open Books  5 x 10" each    Latissimus/ QL Stretch  2 x 20" each side  (not today)    Thread the needle  5 x each 5" *   Cat and Cow  5 x each 10" *   Child's Pose  5 x 10" *   Prone Ys and Ts  10 x each *   Braced Marching  2 x 10 " each *   Alternating arms  2 x 10 each *              supervised modalities: after being cleared for contradictions: Biphasic ESTIM:  Sidra received  electrical stimulation for pain and tissue tightness to the B thoracic paraspinals and B piriformis muscles . Pt received burst mode at a rate of 2 pps for 10 minutes. Sidra tolerated treatment well without any adverse effects.      hot pack for 10 minutes to B thoracic, lumbar and sacral muscles with ESTIM.  Patient Education and Home Exercises       Education provided:   - Plan of Care, Home exercise program, Delayed onset muscle soreness     Written Home Exercises Provided: Patient instructed to cont prior HEP. Exercises were reviewed and Sidra was able to demonstrate them prior to the end of the session.  Sidra demonstrated fair  understanding of the education provided. See Electronic Medical Record under Patient Instructions for exercises provided during therapy sessions    Assessment     Sidra is a 38 y.o. female referred to outpatient Physical Therapy with a medical diagnosis of thoracic pain and lumbar radiculopathy. Patient presents with thoracic and low back pain, R LE radiating symptoms, core and LE muscle weakness, tissue tightness, and impaired motor control. Patient's impairments have begun to limit her overall activity tolerance more. PT  progressed patient to warm-up  on rec bike prior to exercises.  PT initiated new spinal stretches and core and thoracic muscle strengthening exercises to continue progressing toward rehab goals. No adverse effects noted to therapy.   Patient prognosis is Good.     Patient will continue to benefit from skilled outpatient physical therapy to address the deficits listed in the problem list box on initial evaluation, provide pt/family education and to maximize pt's level of independence in the home and community environment.     Patient's spiritual, cultural and educational needs considered and pt  agreeable to plan of care and goals.     Anticipated barriers to physical therapy: chronicity of pain, scoliosis, compliance with Home exercise program and treatment     Goals:   Short Term Goals: 2 weeks   Patient will independently complete Home exercise program with correct form.   Patient will report decreased resting pain to less than or equal to 2/10 for improved quality of life.      Long Term Goals: 4 weeks   Patient will report decrease in pain to less than or equal to 4/10 when standing greater than 30 minutes to increase activity tolerance.   Patient will report decrease in radiating occurrences from 40% of the time  to less than or equal to 15% of the time to allow patient the ability to perform activities of daily living   Patient will increase B lower extremity strength to 4+/5 to improve ambulation ability  Patient will lift 10 pounds from the floor with and carry it 50 feet with reports of less than or equal 4/10 low back pain for increased tolerance to ADLs.     Plan     Plan of care Certification: 1/3/2024 to 2/22/2024.  Outpatient Physical Therapy 2 times weekly for 4 weeks to include the following interventions: Electrical Stimulation unattended, Manual Therapy, Moist Heat/ Ice, Patient Education, Therapeutic Activities, Therapeutic Exercise, and Ultrasound.     Continue POC per PT orders to progress patient toward rehab goals.   Marco Antonio Pollard, PT, DPT     2/8/2024

## 2024-02-13 ENCOUNTER — CLINICAL SUPPORT (OUTPATIENT)
Dept: REHABILITATION | Facility: HOSPITAL | Age: 39
End: 2024-02-13
Payer: MEDICAID

## 2024-02-13 DIAGNOSIS — G89.29 CHRONIC BILATERAL THORACIC BACK PAIN: ICD-10-CM

## 2024-02-13 DIAGNOSIS — M54.41 CHRONIC BILATERAL LOW BACK PAIN WITH RIGHT-SIDED SCIATICA: Primary | ICD-10-CM

## 2024-02-13 DIAGNOSIS — G89.29 CHRONIC BILATERAL LOW BACK PAIN WITH RIGHT-SIDED SCIATICA: Primary | ICD-10-CM

## 2024-02-13 DIAGNOSIS — M54.6 CHRONIC BILATERAL THORACIC BACK PAIN: ICD-10-CM

## 2024-02-13 PROCEDURE — 97014 ELECTRIC STIMULATION THERAPY: CPT | Mod: CQ

## 2024-02-13 PROCEDURE — 97110 THERAPEUTIC EXERCISES: CPT | Mod: CQ

## 2024-02-13 NOTE — PROGRESS NOTES
"[facility]   Physical Therapy Treatment Note      Name: Sidra Wintre  Clinic Number: [id number]    Therapy Diagnosis:        Encounter Diagnoses   Name Primary?    Thoracic back pain, unspecified back pain laterality, unspecified chronicity      Lumbar radiculopathy      Chronic bilateral low back pain with right-sided sciatica Yes    Chronic bilateral thoracic back pain          Physician: Mingo Jiménez MD     Physician Orders: PT Eval and Treat   Medical Diagnosis from Referral: thoracic pain   Evaluation Date: 1/3/2024  Authorization Period Expiration: 12/10/2024  Plan of Care Expiration: 2/22/2024   Progress Note Due: 2/22/2024  Date of Surgery: NA   Visit # / Visits authorized: 12/16  FOTO: Needs FOTO at 12th visit      Precautions: Standard      Time In: 11:02  Time Out: 11:47  Total Billable Time:  45 minutes     Visit Date: 2/13/2024      PTA Visit #: 3/5  Subjective   Patient reports:  Feeling tightness and stiffness in mid to low back and hips.   .  She was compliant with home exercise program.  Response to previous treatment: Decreased pain   Functional change: Early in Plan of Care     Pain: 1/10  Location: bilateral low back      Objective      Objective Measures updated at progress report unless specified.     Treatment     Sidra received the treatments listed below:      therapeutic exercises to develop strength, flexibility, posture, and core stabilization.  See flowsheet below      Ther-Ex Reps    Nustep 8 minutes   Wedge 2 minutes    Hamstring Stretch 2 x 30" each    Posterior Pelvic Tilts 30 x 3"    Lower Trunk Rotations 5 x 10" each    Single Knee to Chest Stretch 5 x 10 " Each    Piriformis Stretch 2 x 20" each    Figure 4 Stretch  2 x 20" each    Open Books  5 x 10" each    Latissimus/ QL Stretch  2 x 20" each side                                                   supervised modalities: after being cleared for contradictions: Biphasic ESTIM:  Sidra " received  electrical stimulation for pain and tissue tightness to the B thoracic paraspinals and B piriformis muscles . Pt received burst mode at a rate of 2 pps for 10 minutes. Sidra tolerated treatment well without any adverse effects.      hot pack for 10 minutes to B thoracic, lumbar and sacral muscles with ESTIM.  Patient Education and Home Exercises       Education provided:   - Plan of Care, Home exercise program, Delayed onset muscle soreness     Written Home Exercises Provided: Patient instructed to cont prior HEP. Exercises were reviewed and Sidra was able to demonstrate them prior to the end of the session.  Sidra demonstrated fair  understanding of the education provided. See Electronic Medical Record under Patient Instructions for exercises provided during therapy sessions    Assessment     Sidra is a 38 y.o. female referred to outpatient Physical Therapy with a medical diagnosis of thoracic pain and lumbar radiculopathy. Patient presents with thoracic and low back pain, R LE radiating symptoms, core and LE muscle weakness, tissue tightness, and impaired motor control. Patient's impairments have begun to limit her overall activity tolerance more. LPTA  provided pt with proper setup on NuStep to promote musculoskeletal warm-up prior to exercises. Pt progressed through treatment with minimal cueing required for hold times and reps. Patient requires min rest breaks during treamtent session and does not report increased pain.        Patient prognosis is Good.     Patient will continue to benefit from skilled outpatient physical therapy to address the deficits listed in the problem list box on initial evaluation, provide pt/family education and to maximize pt's level of independence in the home and community environment.     Patient's spiritual, cultural and educational needs considered and pt agreeable to plan of care and goals.     Anticipated barriers to physical therapy: chronicity of  pain, scoliosis, compliance with Home exercise program and treatment     Goals:   Short Term Goals: 2 weeks   Patient will independently complete Home exercise program with correct form.   Patient will report decreased resting pain to less than or equal to 2/10 for improved quality of life.      Long Term Goals: 4 weeks   Patient will report decrease in pain to less than or equal to 4/10 when standing greater than 30 minutes to increase activity tolerance.   Patient will report decrease in radiating occurrences from 40% of the time  to less than or equal to 15% of the time to allow patient the ability to perform activities of daily living   Patient will increase B lower extremity strength to 4+/5 to improve ambulation ability  Patient will lift 10 pounds from the floor with and carry it 50 feet with reports of less than or equal 4/10 low back pain for increased tolerance to ADLs.     Plan     Plan of care Certification: 1/3/2024 to 2/22/2024.  Outpatient Physical Therapy 2 times weekly for 4 weeks to include the following interventions: Electrical Stimulation unattended, Manual Therapy, Moist Heat/ Ice, Patient Education, Therapeutic Activities, Therapeutic Exercise, and Ultrasound.     Plan of care Re-certification per PT order to progress patient toward rehab goals.   MONICA Daniels   2/13/2024

## 2024-02-15 ENCOUNTER — CLINICAL SUPPORT (OUTPATIENT)
Dept: REHABILITATION | Facility: HOSPITAL | Age: 39
End: 2024-02-15
Payer: MEDICAID

## 2024-02-15 DIAGNOSIS — M54.6 CHRONIC BILATERAL THORACIC BACK PAIN: ICD-10-CM

## 2024-02-15 DIAGNOSIS — M54.41 CHRONIC BILATERAL LOW BACK PAIN WITH RIGHT-SIDED SCIATICA: Primary | ICD-10-CM

## 2024-02-15 DIAGNOSIS — G89.29 CHRONIC BILATERAL LOW BACK PAIN WITH RIGHT-SIDED SCIATICA: Primary | ICD-10-CM

## 2024-02-15 DIAGNOSIS — G89.29 CHRONIC BILATERAL THORACIC BACK PAIN: ICD-10-CM

## 2024-02-15 PROCEDURE — 97110 THERAPEUTIC EXERCISES: CPT | Mod: CQ

## 2024-02-15 PROCEDURE — 97014 ELECTRIC STIMULATION THERAPY: CPT | Mod: CQ

## 2024-02-15 NOTE — PROGRESS NOTES
"[facility]   Physical Therapy Treatment Note      Name: Sidra Winter  Clinic Number: [id number]    Therapy Diagnosis:        Encounter Diagnoses   Name Primary?    Thoracic back pain, unspecified back pain laterality, unspecified chronicity      Lumbar radiculopathy      Chronic bilateral low back pain with right-sided sciatica Yes    Chronic bilateral thoracic back pain          Physician: Mingo Jiménez MD     Physician Orders: PT Eval and Treat   Medical Diagnosis from Referral: thoracic pain   Evaluation Date: 1/3/2024  Authorization Period Expiration: 12/10/2024  Plan of Care Expiration: 2/22/2024   Progress Note Due: 2/22/2024  Date of Surgery: NA   Visit # / Visits authorized: 12/16  FOTO: Needs FOTO at 12th visit      Precautions: Standard      Time In: 11:02  Time Out: 11:50  Total Billable Time:  48 minutes     Visit Date: 2/15/2024      PTA Visit #: 3/5  Subjective   Patient reports: Patient states "I actually feel really good today.  I don't have much pain at all".   .  She was compliant with home exercise program.  Response to previous treatment: Decreased pain   Functional change: Early in Plan of Care     Pain: 1/10  Location: bilateral low back      Objective      Objective Measures updated at progress report unless specified.     Treatment     Sidra received the treatments listed below:      therapeutic exercises to develop strength, flexibility, posture, and core stabilization.  See flowsheet below      Ther-Ex Reps    Nustep 8 minutes   Wedge 2 minutes    Hamstring Stretch 2 x 30" each    Posterior Pelvic Tilts 30 x 3"    Lower Trunk Rotations 5 x 10" each    Single Knee to Chest Stretch 5 x 10 " Each    Piriformis Stretch 2 x 20" each    Figure 4 Stretch  2 x 20" each    Open Books  5 x 10" each    Latissimus/ QL Stretch  2 x 20" each side                                                   supervised modalities: after being cleared for contradictions: " Biphasic ESTIM:  Sidra received  electrical stimulation for pain and tissue tightness to the B thoracic paraspinals and B piriformis muscles . Pt received burst mode at a rate of 2 pps for 10 minutes. Sidra tolerated treatment well without any adverse effects.      hot pack for 10 minutes to B thoracic, lumbar and sacral muscles with ESTIM.  Patient Education and Home Exercises       Education provided:   - Plan of Care, Home exercise program, Delayed onset muscle soreness     Written Home Exercises Provided: Patient instructed to cont prior HEP. Exercises were reviewed and Sidra was able to demonstrate them prior to the end of the session.  Sidra demonstrated fair  understanding of the education provided. See Electronic Medical Record under Patient Instructions for exercises provided during therapy sessions    Assessment     Sidra is a 38 y.o. female referred to outpatient Physical Therapy with a medical diagnosis of thoracic pain and lumbar radiculopathy. Patient presents with thoracic and low back pain, R LE radiating symptoms, core and LE muscle weakness, tissue tightness, and impaired motor control. Patient's impairments have begun to limit her overall activity tolerance more. LPTA  provided pt with proper setup on NuStep to promote musculoskeletal warm-up prior to exercises. Pt progressed through treatment with minimal cueing required for hold times and reps, and overall good carryover progressing through completion of Therapeutic exercises.  Patient denies complaints of pain during physical therapy treatment session and at end of session.  No adverse effects noted.     Patient prognosis is Good.     Patient will continue to benefit from skilled outpatient physical therapy to address the deficits listed in the problem list box on initial evaluation, provide pt/family education and to maximize pt's level of independence in the home and community environment.     Patient's spiritual, cultural  and educational needs considered and pt agreeable to plan of care and goals.     Anticipated barriers to physical therapy: chronicity of pain, scoliosis, compliance with Home exercise program and treatment     Goals:   Short Term Goals: 2 weeks   Patient will independently complete Home exercise program with correct form.   Patient will report decreased resting pain to less than or equal to 2/10 for improved quality of life.      Long Term Goals: 4 weeks   Patient will report decrease in pain to less than or equal to 4/10 when standing greater than 30 minutes to increase activity tolerance.   Patient will report decrease in radiating occurrences from 40% of the time  to less than or equal to 15% of the time to allow patient the ability to perform activities of daily living   Patient will increase B lower extremity strength to 4+/5 to improve ambulation ability  Patient will lift 10 pounds from the floor with and carry it 50 feet with reports of less than or equal 4/10 low back pain for increased tolerance to ADLs.     Plan     Plan of care Certification: 1/3/2024 to 2/22/2024.  Outpatient Physical Therapy 2 times weekly for 4 weeks to include the following interventions: Electrical Stimulation unattended, Manual Therapy, Moist Heat/ Ice, Patient Education, Therapeutic Activities, Therapeutic Exercise, and Ultrasound.     Continue Plan of Care per PT order to progress patient toward rehab goals as tolerated by patient.   MONICA Daniels   2/15/2024

## 2024-02-20 ENCOUNTER — CLINICAL SUPPORT (OUTPATIENT)
Dept: REHABILITATION | Facility: HOSPITAL | Age: 39
End: 2024-02-20
Payer: MEDICAID

## 2024-02-20 DIAGNOSIS — M54.6 CHRONIC BILATERAL THORACIC BACK PAIN: ICD-10-CM

## 2024-02-20 DIAGNOSIS — G89.29 CHRONIC BILATERAL THORACIC BACK PAIN: ICD-10-CM

## 2024-02-20 DIAGNOSIS — G89.29 CHRONIC BILATERAL LOW BACK PAIN WITH RIGHT-SIDED SCIATICA: Primary | ICD-10-CM

## 2024-02-20 DIAGNOSIS — M54.41 CHRONIC BILATERAL LOW BACK PAIN WITH RIGHT-SIDED SCIATICA: Primary | ICD-10-CM

## 2024-02-20 PROCEDURE — 97010 HOT OR COLD PACKS THERAPY: CPT | Mod: CQ

## 2024-02-20 PROCEDURE — 97014 ELECTRIC STIMULATION THERAPY: CPT | Mod: CQ

## 2024-02-20 PROCEDURE — 97110 THERAPEUTIC EXERCISES: CPT | Mod: CQ

## 2024-02-20 NOTE — PROGRESS NOTES
"[facility]   Physical Therapy Treatment Note      Name: Sidra Winter  Clinic Number: [id number]    Therapy Diagnosis:        Encounter Diagnoses   Name Primary?    Thoracic back pain, unspecified back pain laterality, unspecified chronicity      Lumbar radiculopathy      Chronic bilateral low back pain with right-sided sciatica Yes    Chronic bilateral thoracic back pain          Physician: Mingo Jiménez MD     Physician Orders: PT Eval and Treat   Medical Diagnosis from Referral: thoracic pain   Evaluation Date: 1/3/2024  Authorization Period Expiration: 12/10/2024  Plan of Care Expiration: 2/22/2024   Progress Note Due: 2/22/2024  Date of Surgery: NA   Visit # / Visits authorized: 1516  FOTO: Needs FOTO at 12th visit      Precautions: Standard      Time In: 11:00  Time Out: 11:45  Total Billable Time:  45 minutes     Visit Date: 2/20/2024      PTA Visit #: 3/5  Subjective   Patient reports: No new complaints.   .  She was compliant with home exercise program.  Response to previous treatment: Decreased pain   Functional change: Early in Plan of Care     Pain: 2/10  Location: bilateral low back      Objective      Objective Measures updated at progress report unless specified.     Treatment     Sidra received the treatments listed below:      therapeutic exercises to develop strength, flexibility, posture, and core stabilization.  See flowsheet below      Ther-Ex Reps    Nustep 8 minutes   Wedge 2 minutes    Hamstring Stretch 2 x 30" each    Posterior Pelvic Tilts 30 x 3"    Lower Trunk Rotations 5 x 10" each    Single Knee to Chest Stretch 5 x 10 " Each    Piriformis Stretch 2 x 20" each    Figure 4 Stretch  2 x 20" each    Open Books  5 x 10" each    Latissimus/ QL Stretch  2 x 20" each side                                                   supervised modalities: after being cleared for contradictions: Biphasic ESTIM:  Sidra received  electrical stimulation for pain and " tissue tightness to the B thoracic paraspinals and B piriformis muscles . Pt received burst mode at a rate of 2 pps for 10 minutes. Sidra tolerated treatment well without any adverse effects.      hot pack for 10 minutes to B thoracic, lumbar and sacral muscles with ESTIM.  Patient Education and Home Exercises       Education provided:   - Plan of Care, Home exercise program, Delayed onset muscle soreness     Written Home Exercises Provided: Patient instructed to cont prior HEP. Exercises were reviewed and Sidra was able to demonstrate them prior to the end of the session.  Sidra demonstrated fair  understanding of the education provided. See Electronic Medical Record under Patient Instructions for exercises provided during therapy sessions    Assessment     Sidra is a 38 y.o. female referred to outpatient Physical Therapy with a medical diagnosis of thoracic pain and lumbar radiculopathy. Patient presents with thoracic and low back pain, R LE radiating symptoms, core and LE muscle weakness, tissue tightness, and impaired motor control. Patient's impairments have begun to limit her overall activity tolerance more. LPTA  provided pt with proper setup on NuStep to promote musculoskeletal warm-up prior to exercises. Pt progressed through treatment with improved carryover of proper form, count, and hold times.  Pt displays improved flexibility with no c/o pain.  No adverse effects noted.     Patient prognosis is Good.     Patient will continue to benefit from skilled outpatient physical therapy to address the deficits listed in the problem list box on initial evaluation, provide pt/family education and to maximize pt's level of independence in the home and community environment.     Patient's spiritual, cultural and educational needs considered and pt agreeable to plan of care and goals.     Anticipated barriers to physical therapy: chronicity of pain, scoliosis, compliance with Home exercise program and  treatment     Goals:   Short Term Goals: 2 weeks   Patient will independently complete Home exercise program with correct form.   Patient will report decreased resting pain to less than or equal to 2/10 for improved quality of life.      Long Term Goals: 4 weeks   Patient will report decrease in pain to less than or equal to 4/10 when standing greater than 30 minutes to increase activity tolerance.   Patient will report decrease in radiating occurrences from 40% of the time  to less than or equal to 15% of the time to allow patient the ability to perform activities of daily living   Patient will increase B lower extremity strength to 4+/5 to improve ambulation ability  Patient will lift 10 pounds from the floor with and carry it 50 feet with reports of less than or equal 4/10 low back pain for increased tolerance to ADLs.     Plan     Plan of care Certification: 1/3/2024 to 2/22/2024.  Outpatient Physical Therapy 2 times weekly for 4 weeks to include the following interventions: Electrical Stimulation unattended, Manual Therapy, Moist Heat/ Ice, Patient Education, Therapeutic Activities, Therapeutic Exercise, and Ultrasound.     Continue Plan of Care per PT order to progress patient toward rehab goals as tolerated by patient.   MONICA Erazo   2/20/2024

## 2024-02-22 ENCOUNTER — CLINICAL SUPPORT (OUTPATIENT)
Dept: REHABILITATION | Facility: HOSPITAL | Age: 39
End: 2024-02-22
Payer: MEDICAID

## 2024-02-22 ENCOUNTER — DOCUMENTATION ONLY (OUTPATIENT)
Dept: REHABILITATION | Facility: HOSPITAL | Age: 39
End: 2024-02-22
Payer: MEDICAID

## 2024-02-22 DIAGNOSIS — G89.29 CHRONIC BILATERAL LOW BACK PAIN WITH RIGHT-SIDED SCIATICA: Primary | ICD-10-CM

## 2024-02-22 DIAGNOSIS — G89.29 CHRONIC BILATERAL THORACIC BACK PAIN: ICD-10-CM

## 2024-02-22 DIAGNOSIS — M54.6 CHRONIC BILATERAL THORACIC BACK PAIN: ICD-10-CM

## 2024-02-22 DIAGNOSIS — M54.41 CHRONIC BILATERAL LOW BACK PAIN WITH RIGHT-SIDED SCIATICA: Primary | ICD-10-CM

## 2024-02-22 PROCEDURE — 97014 ELECTRIC STIMULATION THERAPY: CPT | Mod: CQ

## 2024-02-22 PROCEDURE — 97110 THERAPEUTIC EXERCISES: CPT | Mod: CQ

## 2024-02-22 NOTE — PROGRESS NOTES
"[facility]   Physical Therapy Treatment Note      Name: Sidra Winter  Clinic Number: [id number]    Therapy Diagnosis:        Encounter Diagnoses   Name Primary?    Thoracic back pain, unspecified back pain laterality, unspecified chronicity      Lumbar radiculopathy      Chronic bilateral low back pain with right-sided sciatica Yes    Chronic bilateral thoracic back pain          Physician: Mingo Jiménez MD     Physician Orders: PT Eval and Treat   Medical Diagnosis from Referral: thoracic pain   Evaluation Date: 1/3/2024  Authorization Period Expiration: 12/10/2024  Plan of Care Expiration: 2/22/2024   Progress Note Due: 2/22/2024  Date of Surgery: NA   Visit # / Visits authorized: 16/16  FOTO: Needs FOTO at 16th visit      Precautions: Standard      Time In: 11:03  Time Out: 11:46  Total Billable Time:  43 minutes     Visit Date: 2/22/2024      PTA Visit #: 4/5  Subjective   Patient reports: No reports of low back or Thoracic pain upon arrival to physical therapy treatment session.   .  She was compliant with home exercise program.  Response to previous treatment: Decreased pain   Functional change: Early in Plan of Care     Pain: 2/10  Location: bilateral low back      Objective      Objective Measures updated at progress report unless specified.     Treatment     Sidra received the treatments listed below:      therapeutic exercises to develop strength, flexibility, posture, and core stabilization.  See flowsheet below      Ther-Ex Reps    Nustep 8 minutes   Wedge 2 minutes    Hamstring Stretch 2 x 30" each    Posterior Pelvic Tilts 30 x 3"    Lower Trunk Rotations 5 x 10" each    Single Knee to Chest Stretch 5 x 10 " Each    Piriformis Stretch 2 x 20" each    Figure 4 Stretch  2 x 20" each    Open Books  5 x 10" each    Latissimus/ QL Stretch  2 x 20" each side                                                   supervised modalities: after being cleared for " contradictions: Biphasic ESTIM:  Sidra received  electrical stimulation for pain and tissue tightness to the B thoracic paraspinals and B piriformis muscles . Pt received burst mode at a rate of 2 pps for 10 minutes. Sidra tolerated treatment well without any adverse effects.      hot pack for 10 minutes to B thoracic, lumbar and sacral muscles with ESTIM.  Patient Education and Home Exercises       Education provided:   - Plan of Care, Home exercise program, Delayed onset muscle soreness     Written Home Exercises Provided: Patient instructed to cont prior HEP. Exercises were reviewed and Sidra was able to demonstrate them prior to the end of the session.  Sidra demonstrated fair  understanding of the education provided. See Electronic Medical Record under Patient Instructions for exercises provided during therapy sessions    Assessment     Sidra is a 38 y.o. female referred to outpatient Physical Therapy with a medical diagnosis of thoracic pain and lumbar radiculopathy. Patient presents with thoracic and low back pain, R LE radiating symptoms, core and LE muscle weakness, tissue tightness, and impaired motor control. Patient's impairments have begun to limit her overall activity tolerance more. LPTA  provided pt with proper setup on NuStep to promote musculoskeletal warm-up prior to exercises. Pt progressed through treatment with good  carryover of proper form, count, and hold times.  Patient's goals assessed by supervising physical therapist for possible d/c from physical therapy services.     Patient prognosis is Good.     Patient will continue to benefit from skilled outpatient physical therapy to address the deficits listed in the problem list box on initial evaluation, provide pt/family education and to maximize pt's level of independence in the home and community environment.     Patient's spiritual, cultural and educational needs considered and pt agreeable to plan of care and goals.      Anticipated barriers to physical therapy: chronicity of pain, scoliosis, compliance with Home exercise program and treatment     Goals:   Short Term Goals: 2 weeks   Patient will independently complete Home exercise program with correct form.   Patient will report decreased resting pain to less than or equal to 2/10 for improved quality of life.      Long Term Goals: 4 weeks   Patient will report decrease in pain to less than or equal to 4/10 when standing greater than 30 minutes to increase activity tolerance.   Patient will report decrease in radiating occurrences from 40% of the time  to less than or equal to 15% of the time to allow patient the ability to perform activities of daily living   Patient will increase B lower extremity strength to 4+/5 to improve ambulation ability  Patient will lift 10 pounds from the floor with and carry it 50 feet with reports of less than or equal 4/10 low back pain for increased tolerance to ADLs.     Plan     Plan of care Certification: 1/3/2024 to 2/22/2024.  Outpatient Physical Therapy 2 times weekly for 4 weeks to include the following interventions: Electrical Stimulation unattended, Manual Therapy, Moist Heat/ Ice, Patient Education, Therapeutic Activities, Therapeutic Exercise, and Ultrasound.     Plan to discharge patient from physical therapy services per PT order.   MONICA Daniels   2/22/2024

## 2024-02-22 NOTE — PROGRESS NOTES
[facility]  PT Discharge Note    Name: Sidra Winter  Clinic Number: [id number]    Therapy Diagnosis:           Encounter Diagnoses   Name Primary?    Thoracic back pain, unspecified back pain laterality, unspecified chronicity      Lumbar radiculopathy      Chronic bilateral low back pain with right-sided sciatica Yes    Chronic bilateral thoracic back pain          Physician: Mingo Jiménez MD     Physician Orders: PT Eval and Treat   Medical Diagnosis from Referral: thoracic pain   Evaluation Date: 1/3/2024  Date of Last visit: 2/22/2024  Total Visits Received: 15    ASSESSMENT      Patient continues to report episodes of back pain that is worse some days than others. She does report increased activity tolerance with less pain since beginning PT. Patient demonstrates increased flexibility and muscle strength     Discharge reason: Patient has reached the maximum rehab potential for the present time    Discharge FOTO Score: 52%    Goals:   Short Term Goals: 2 weeks   Patient will independently complete Home exercise program with correct form. -MET   Patient will report decreased resting pain to less than or equal to 2/10 for improved quality of life. -MET      Long Term Goals: 4 weeks   Patient will report decrease in pain to less than or equal to 4/10 when standing greater than 30 minutes to increase activity tolerance.   Patient will report decrease in radiating occurrences from 40% of the time  to less than or equal to 15% of the time to allow patient the ability to perform activities of daily living -MET   Patient will increase B lower extremity strength to 4+/5 to improve ambulation ability-MET   Patient will lift 10 pounds from the floor with and carry it 50 feet with reports of less than or equal 4/10 low back pain for increased tolerance to ADLs. - MET       PLAN   This patient is discharged from Physical Therapy      Marco Antonio Pollard, PT, DPT

## 2024-03-09 ENCOUNTER — HOSPITAL ENCOUNTER (EMERGENCY)
Facility: HOSPITAL | Age: 39
Discharge: HOME | End: 2024-03-09
Payer: MEDICAID

## 2024-03-09 VITALS
TEMPERATURE: 99 F | OXYGEN SATURATION: 98 % | BODY MASS INDEX: 38.65 KG/M2 | SYSTOLIC BLOOD PRESSURE: 176 MMHG | RESPIRATION RATE: 18 BRPM | HEIGHT: 70 IN | HEART RATE: 99 BPM | DIASTOLIC BLOOD PRESSURE: 76 MMHG | WEIGHT: 270 LBS

## 2024-03-09 DIAGNOSIS — O20.9 VAGINAL BLEEDING IN PREGNANCY, FIRST TRIMESTER: Primary | ICD-10-CM

## 2024-03-09 LAB
ANION GAP SERPL CALCULATED.3IONS-SCNC: 10 MMOL/L (ref 7–16)
B-HCG UR QL: POSITIVE
BACTERIA #/AREA URNS HPF: ABNORMAL /HPF
BASOPHILS # BLD AUTO: 0.05 K/UL (ref 0–0.2)
BASOPHILS NFR BLD AUTO: 0.5 % (ref 0–1)
BILIRUB UR QL STRIP: NEGATIVE
BUN SERPL-MCNC: 9 MG/DL (ref 7–18)
BUN/CREAT SERPL: 11 (ref 6–20)
CALCIUM SERPL-MCNC: 9.3 MG/DL (ref 8.5–10.1)
CHLORIDE SERPL-SCNC: 107 MMOL/L (ref 98–107)
CLARITY UR: CLEAR
CO2 SERPL-SCNC: 27 MMOL/L (ref 21–32)
COLOR UR: COLORLESS
CREAT SERPL-MCNC: 0.84 MG/DL (ref 0.55–1.02)
CTP QC/QA: YES
DIFFERENTIAL METHOD BLD: ABNORMAL
EGFR (NO RACE VARIABLE) (RUSH/TITUS): 92 ML/MIN/1.73M2
EOSINOPHIL # BLD AUTO: 0.36 K/UL (ref 0–0.5)
EOSINOPHIL NFR BLD AUTO: 3.3 % (ref 1–4)
ERYTHROCYTE [DISTWIDTH] IN BLOOD BY AUTOMATED COUNT: 14.6 % (ref 11.5–14.5)
GLUCOSE SERPL-MCNC: 111 MG/DL (ref 74–106)
GLUCOSE UR STRIP-MCNC: NORMAL MG/DL
HCG SERPL-ACNC: NORMAL MIU/ML
HCT VFR BLD AUTO: 38.7 % (ref 38–47)
HGB BLD-MCNC: 11.9 G/DL (ref 12–16)
IMM GRANULOCYTES # BLD AUTO: 0.03 K/UL (ref 0–0.04)
IMM GRANULOCYTES NFR BLD: 0.3 % (ref 0–0.4)
KETONES UR STRIP-SCNC: NEGATIVE MG/DL
LEUKOCYTE ESTERASE UR QL STRIP: NEGATIVE
LYMPHOCYTES # BLD AUTO: 3.56 K/UL (ref 1–4.8)
LYMPHOCYTES NFR BLD AUTO: 32.9 % (ref 27–41)
MCH RBC QN AUTO: 26.6 PG (ref 27–31)
MCHC RBC AUTO-ENTMCNC: 30.7 G/DL (ref 32–36)
MCV RBC AUTO: 86.6 FL (ref 80–96)
MONOCYTES # BLD AUTO: 0.73 K/UL (ref 0–0.8)
MONOCYTES NFR BLD AUTO: 6.7 % (ref 2–6)
MPC BLD CALC-MCNC: 10.1 FL (ref 9.4–12.4)
NEUTROPHILS # BLD AUTO: 6.1 K/UL (ref 1.8–7.7)
NEUTROPHILS NFR BLD AUTO: 56.3 % (ref 53–65)
NITRITE UR QL STRIP: NEGATIVE
NRBC # BLD AUTO: 0 X10E3/UL
NRBC, AUTO (.00): 0 %
PH UR STRIP: 5.5 PH UNITS
PLATELET # BLD AUTO: 374 K/UL (ref 150–400)
POTASSIUM SERPL-SCNC: 3.5 MMOL/L (ref 3.5–5.1)
PROT UR QL STRIP: NEGATIVE
RBC # BLD AUTO: 4.47 M/UL (ref 4.2–5.4)
RBC # UR STRIP: ABNORMAL /UL
RBC #/AREA URNS HPF: 1 /HPF
SODIUM SERPL-SCNC: 140 MMOL/L (ref 136–145)
SP GR UR STRIP: 1
SQUAMOUS #/AREA URNS LPF: ABNORMAL /HPF
UROBILINOGEN UR STRIP-ACNC: NORMAL MG/DL
WBC # BLD AUTO: 10.83 K/UL (ref 4.5–11)
WBC #/AREA URNS HPF: 1 /HPF

## 2024-03-09 PROCEDURE — 99284 EMERGENCY DEPT VISIT MOD MDM: CPT | Mod: ,,, | Performed by: NURSE PRACTITIONER

## 2024-03-09 PROCEDURE — 80048 BASIC METABOLIC PNL TOTAL CA: CPT | Performed by: NURSE PRACTITIONER

## 2024-03-09 PROCEDURE — 99283 EMERGENCY DEPT VISIT LOW MDM: CPT

## 2024-03-09 PROCEDURE — 84702 CHORIONIC GONADOTROPIN TEST: CPT | Performed by: NURSE PRACTITIONER

## 2024-03-09 PROCEDURE — 81003 URINALYSIS AUTO W/O SCOPE: CPT | Performed by: NURSE PRACTITIONER

## 2024-03-09 PROCEDURE — 85025 COMPLETE CBC W/AUTO DIFF WBC: CPT | Performed by: NURSE PRACTITIONER

## 2024-03-09 PROCEDURE — 81025 URINE PREGNANCY TEST: CPT | Performed by: NURSE PRACTITIONER

## 2024-03-10 NOTE — DISCHARGE INSTRUCTIONS
You need to get a prenatal vitamin and take daily or as directed on bottle. Ensure you are drinking plenty of fluids, especially water. Avoid sex/pelvic rest until follow up with your OB/GYN. Call your OB/GYN on Monday and make an appointment to be seen. Return to the ED for worsening signs and symptoms or otherwise as needed.

## 2024-03-10 NOTE — ED PROVIDER NOTES
Encounter Date: 3/9/2024       History     Chief Complaint   Patient presents with    Vaginal Bleeding     39 y/o WF presents to the emergency department with c/o having had some vaginal bleeding or what she described as spotting earlier today. She is a , recently had a positive pregnancy test. LMP was . She states she called her local emergency department and she was instructed to come here. She denies any abd pain, cramping. She has had no dysuria or hematuria. She states she has had some mild nausea but no vomiting or diarrhea.     The history is provided by the patient.     Review of patient's allergies indicates:   Allergen Reactions    Adhesive Rash     Past Medical History:   Diagnosis Date    Anxiety disorder, unspecified     History of COVID-19     Scoliosis     chronic back pain     Past Surgical History:   Procedure Laterality Date     SECTION  2021     SECTION N/A 10/13/2022    Procedure:  SECTION;  Surgeon: Mario Foster MD;  Location: [facility] L&[facility];  Service: OB/GYN;  Laterality: N/A;     Family History   Problem Relation Age of Onset    Diabetes Paternal Grandfather     Breast cancer Maternal Grandfather     Diabetes Maternal Grandfather     Ankylosing spondylitis Mother     Sjogren's syndrome Mother     Osteoarthritis Mother     Irritable bowel syndrome Mother     Raynaud syndrome Mother     Gestational diabetes Brother     Breast cancer Maternal Aunt      Social History     Tobacco Use    Smoking status: Former     Passive exposure: Never    Smokeless tobacco: Never   Substance Use Topics    Alcohol use: Not Currently     Comment: Socially    Drug use: Not Currently     Review of Systems   All other systems reviewed and are negative.      Physical Exam     Initial Vitals   BP Pulse Resp Temp SpO2   24 2105 24 2105 24 2105 24 21024 210   (!) 176/76 99 18 98.9 °F (37.2 °C) 98 %      MAP       --                Physical  Exam    Constitutional: She appears well-developed and well-nourished. She is cooperative.   BMI >30   Cardiovascular:  Normal rate, regular rhythm, normal heart sounds and normal pulses.           Pulmonary/Chest: Effort normal and breath sounds normal.   Abdominal: Abdomen is soft. Bowel sounds are normal. There is no abdominal tenderness.     Neurological: She is alert and oriented to person, place, and time.   Skin: Skin is warm, dry and intact. Capillary refill takes less than 2 seconds.   Psychiatric: She has a normal mood and affect. Her speech is normal and behavior is normal. Judgment and thought content normal. Cognition and memory are normal.         Medical Screening Exam   See Full Note    ED Course   Procedures  Labs Reviewed   URINALYSIS, REFLEX TO URINE CULTURE - Abnormal; Notable for the following components:       Result Value    Blood, UA Small (*)     All other components within normal limits   BASIC METABOLIC PANEL - Abnormal; Notable for the following components:    Glucose 111 (*)     All other components within normal limits   CBC WITH DIFFERENTIAL - Abnormal; Notable for the following components:    Hemoglobin 11.9 (*)     MCH 26.6 (*)     MCHC 30.7 (*)     RDW 14.6 (*)     Monocytes % 6.7 (*)     All other components within normal limits   URINALYSIS, MICROSCOPIC - Abnormal; Notable for the following components:    Bacteria, UA Few (*)     Squamous Epithelial Cells, UA Occasional (*)     All other components within normal limits   POCT URINE PREGNANCY - Abnormal; Notable for the following components:    POC Preg Test, Ur Positive (*)     All other components within normal limits   CBC W/ AUTO DIFFERENTIAL    Narrative:     The following orders were created for panel order CBC auto differential.  Procedure                               Abnormality         Status                     ---------                               -----------         ------                     CBC with  Differential[[id number]]       Abnormal            Final result                 Please view results for these tests on the individual orders.   HCG, TOTAL, QUANTITATIVE          Imaging Results    None          Medications - No data to display  Medical Decision Making  37 y/o WF presents to the emergency department with c/o having had some vaginal bleeding or what she described as spotting earlier today. She is a , recently had a positive pregnancy test. LMP was . She states she called her local emergency department and she was instructed to come here. She denies any abd pain, cramping. She has had no dysuria or hematuria. She states she has had some mild nausea but no vomiting or diarrhea.    Problems Addressed:  Vaginal bleeding in pregnancy, first trimester:     Details: Work up reassuring. According to LMP patient about 2-3 weeks gestation... no indication for US. Instructed to follow up with OB/GYN in 2-3 days for recheck and continued care and management. Pregnancy precautions discussed. Counseled on supportive measures. Warning s/s discussed and return precautions given; the patient has v/u.      Amount and/or Complexity of Data Reviewed  Labs: ordered.    Risk  OTC drugs.                                      Clinical Impression:   Final diagnoses:  [O20.9] Vaginal bleeding in pregnancy, first trimester (Primary)        ED Disposition Condition    Discharge Stable          ED Prescriptions    None       Follow-up Information       Follow up With Specialties Details Why Contact Info    OB/GYN  Schedule an appointment as soon as possible for a visit                Leeanne Kern FNP  [phone]

## 2024-03-10 NOTE — ED TRIAGE NOTES
States postive pregency test 3/7/2024, states last menstrual cycle on 02/29/2024 currently 2 weeks gestation, noticed spotting 2 hours pta, spotting has resolved

## 2024-03-11 ENCOUNTER — OFFICE VISIT (OUTPATIENT)
Dept: FAMILY MEDICINE | Facility: CLINIC | Age: 39
End: 2024-03-11
Payer: MEDICAID

## 2024-03-11 ENCOUNTER — OFFICE VISIT (OUTPATIENT)
Dept: SPINE | Facility: CLINIC | Age: 39
End: 2024-03-11
Payer: MEDICAID

## 2024-03-11 VITALS
DIASTOLIC BLOOD PRESSURE: 76 MMHG | HEIGHT: 70 IN | SYSTOLIC BLOOD PRESSURE: 124 MMHG | BODY MASS INDEX: 38.75 KG/M2 | TEMPERATURE: 98 F | OXYGEN SATURATION: 99 % | HEART RATE: 73 BPM | WEIGHT: 270.63 LBS

## 2024-03-11 DIAGNOSIS — M41.125 ADOLESCENT IDIOPATHIC SCOLIOSIS OF THORACOLUMBAR SPINE: Primary | ICD-10-CM

## 2024-03-11 DIAGNOSIS — Z32.01 POSITIVE PREGNANCY TEST: Primary | ICD-10-CM

## 2024-03-11 LAB — HCG SERPL-ACNC: NORMAL MIU/ML

## 2024-03-11 PROCEDURE — 3074F SYST BP LT 130 MM HG: CPT | Mod: ,,, | Performed by: FAMILY MEDICINE

## 2024-03-11 PROCEDURE — 84702 CHORIONIC GONADOTROPIN TEST: CPT | Mod: ,,, | Performed by: CLINICAL MEDICAL LABORATORY

## 2024-03-11 PROCEDURE — 3008F BODY MASS INDEX DOCD: CPT | Mod: ,,, | Performed by: FAMILY MEDICINE

## 2024-03-11 PROCEDURE — 99214 OFFICE O/P EST MOD 30 MIN: CPT | Mod: S$PBB,,, | Performed by: ORTHOPAEDIC SURGERY

## 2024-03-11 PROCEDURE — 99211 OFF/OP EST MAY X REQ PHY/QHP: CPT | Mod: PBBFAC | Performed by: ORTHOPAEDIC SURGERY

## 2024-03-11 PROCEDURE — 99213 OFFICE O/P EST LOW 20 MIN: CPT | Mod: ,,, | Performed by: FAMILY MEDICINE

## 2024-03-11 PROCEDURE — 3078F DIAST BP <80 MM HG: CPT | Mod: ,,, | Performed by: FAMILY MEDICINE

## 2024-03-11 RX ORDER — CALCIUM CARBONATE 600 MG
600 TABLET ORAL DAILY
COMMUNITY

## 2024-03-11 RX ORDER — AMOXICILLIN 500 MG
2 CAPSULE ORAL DAILY
COMMUNITY

## 2024-03-11 NOTE — PROGRESS NOTES
MDM/time:  Greater than 30 minutes spent on this encounter including 10 minutes reviewing imaging and notes, 15 minutes with the patient, 5 minutes documentation    ASSESSMENT:  38 y.o. female with thoracolumbar scoliosis     PLAN:  Continue home exercise program.  Follow-up in 6 months    HPI:  38 y.o. female here for repeat evaluation of lower back that radiates into the right flank more than the left.  She has completed physical therapy.  Reports this seemed to help with her pain.  Reports that over the last weekend she has had worsening of back pain due to some stresses in life.  She just found out that she is pregnant.  Dx with scoliosis at age 11 and then seen at [facility] at age 18yrs old.  They had discussed surgery at that time but decided against surgery.  Always has back pain but over the past 5 years.  Denies any type of numbness or tingling in the arms/hands and lower extremities.  She denies difficulty with  strength.  Issues with balance but no falls.  Denies bladder bowel incontinence.  No issues walking distances.  Currently started taking meloxicam as needed for pain which has helped her discomfort.  No prior pain management.  No recent MRI.  No prior spine surgery.  Patient is not a smoker.    IMAGING:  X-rays lumbar spine reviewed show:  On the AP there is thoracolumbar curvature to the left.  There are 5 non-rib-bearing lumbar vertebrae.  On the lateral there is maintained lumbar lordosis.  There is mild spondylotic disease with decreased disc height and osteophyte formation noted.  No fractures or listhesis noted.  No instability on flexion-extension views.       Past Medical History:   Diagnosis Date    Anxiety disorder, unspecified     History of COVID-19     Scoliosis     chronic back pain     Past Surgical History:   Procedure Laterality Date     SECTION  2021     SECTION N/A 10/13/2022    Procedure:  SECTION;  Surgeon: Mario Foster MD;  Location: [facility];  Service: OB/GYN;  Laterality: N/A;     Social History     Tobacco Use    Smoking status: Former     Passive exposure: Never    Smokeless tobacco: Never   Substance Use Topics    Alcohol use: Not Currently     Comment: Socially    Drug use: Not Currently      Current Outpatient Medications   Medication Instructions    sertraline (ZOLOFT) 25 mg, Oral, Daily        EXAM:  Constitutional  General Appearance:  There is no height or weight on file to calculate BMI., NAD  Psychiatric   Orientation: Oriented to time, oriented to place, oriented to person  Mood and Affect: Active and alert, normal mood, normal affect  Gait and Station   Appearance:  Normal gait, normal tandem gait, able to walk on toes, able to walk on heels    LUMBAR  Musculoskeletal System   Hips: Normal appearance, no leg length discrepancy, normal motion; left, normal motion; right    Lumbar Spine                   Inspection:  abnormal alignment, normal sagittal balance                  Range of motion: Normal flexion, extension, lateral bending, rotation. No pain with range of motion                  Bony Palpation of the Lumbar Spine:  No tenderness of the spinous process, no tenderness of the sacrum, no tenderness of the coccyx                  Bony Palpation of the Right Hip:  No tenderness of the iliac crest, no tenderness of the sciatic notch, no tenderness of the SI joint                  Bony Palpation of the Left Hip:  No tenderness of the iliac crest, no tenderness of the sciatic notch, no tenderness of the SI joint                  Soft Tissue Palpation on the Right:  No tenderness of the paraspinal region, no tenderness of the iliolumbar region                  Soft Tissue Palpation on the Left:  No tenderness of the paraspinal region, no tenderness of the iliolumbar region    Motor Strength   L1 Right:  Hip flexion iliopsoas 5/5    L1 Left:  Hip flexion iliopsoas 5/5              L2-L4 Right:  Knee extension quadriceps 5/5,  tibialis anterior 5/5              L2-L4 Left:  Knee extension quadriceps 5/5, tibialis anterior 5/5   L5 Right:  Extensor hallucis llongus 5/5,    L5 Left:  Extensor hallucis longus 5/5,    S1 Right:  Plantar flexion gastrocnemius 5/5   S1 Left:  Plantar flexion gastrocnemius 5/5    Neurological System   Ankle Reflex Right:  normal   Ankle Reflex Left: normal   Knee Reflex Right:  normal   Knee Reflex Left:  normal   Sensation on the Right:  L2 normal, L3 normal, L4 normal, L5 normal, S1 normal   Sensation on the Left:  L2 normal, L3 normal, L4 normal, L5 normal, S1 normal              Special Test on the Right:  Seated straight leg raising test negative, no clonus of the ankle              Special Test on the Left:  Seated straight leg raising test negative, no clonus of the ankle    Skin   Lumbosacral Spine:  Normal skin    Cardiovascular System   Arterial Pulses Right:  Posterior tibialis normal, dorsalis pedis normal   Arterial Pulses Left:  Posterior tibialis normal, dorsalis pedis normal   Edema Right: None   Edema Left:  None

## 2024-03-11 NOTE — PROGRESS NOTES
Braxton Ornelas MD   [facility]  [address] [place] [address]     PATIENT NAME: Sidra Winter  : 1985  DATE: 3/11/24  MRN: [id number]      Billing Provider: Braxton Ornelas MD  Level of Service: [place] OFFICE/OUTPT VISIT, EST, LEVL III, 20-29 MIN  Patient PCP Information       Provider PCP Type    Braxton Ornelas MD General            Reason for Visit / Chief Complaint: Follow-up (Follow up from [facility] on 2024. Patient reports + pregnancy test on 24. Spotting started on 3/9/24. Patient reports only having brown discharge as of now with wiping. Instructed to follow up for blood draw. Patient taking Collagen. Asking if can continue taking it or need to stop. )         History of Present Illness / Problem Focused Workflow     Sidra Winter presents to the clinic with Follow-up (Follow up from [facility] on 2024. Patient reports + pregnancy test on 24. Spotting started on 3/9/24. Patient reports only having brown discharge as of now with wiping. Instructed to follow up for blood draw. Patient taking Collagen. Asking if can continue taking it or need to stop. )     HPI    Review of Systems     Review of Systems   Constitutional:  Negative for activity change, appetite change, fatigue and fever.   HENT:  Negative for nasal congestion, ear pain, hearing loss, sinus pressure/congestion and sore throat.    Respiratory:  Negative for cough, chest tightness and shortness of breath.    Cardiovascular:  Negative for chest pain and palpitations.   Gastrointestinal:  Negative for abdominal pain and fecal incontinence.   Genitourinary:  Negative for bladder incontinence and difficulty urinating.   Musculoskeletal:  Negative for arthralgias.   Integumentary:  Negative for rash.   Neurological:  Negative for dizziness and headaches.        Medical / Social / Family History     Past Medical History:   Diagnosis Date    Anxiety disorder,  unspecified     History of COVID-19     Scoliosis     chronic back pain       Past Surgical History:   Procedure Laterality Date     SECTION  2021     SECTION N/A 10/13/2022    Procedure:  SECTION;  Surgeon: Mario Foster MD;  Location: [facility] L&D;  Service: OB/GYN;  Laterality: N/A;       Social History  Sidra Winter  reports that she has quit smoking. She has never been exposed to tobacco smoke. She has never used smokeless tobacco. She reports that she does not currently use alcohol. She reports that she does not currently use drugs.    Family History  Sidra Winter  family history includes Ankylosing spondylitis in her mother; Breast cancer in her maternal aunt and maternal grandfather; Diabetes in her maternal grandfather and paternal grandfather; Gestational diabetes in her brother; Irritable bowel syndrome in her mother; Osteoarthritis in her mother; Raynaud syndrome in her mother; Sjogren's syndrome in her mother.    Medications and Allergies     Medications  Outpatient Medications Marked as Taking for the 3/11/24 encounter (Office Visit) with Braxton Ornelas MD   Medication Sig Dispense Refill    calcium carbonate (OS-JAVED) 600 mg calcium (1,500 mg) Tab Take 600 mg by mouth once daily.      prenatal no115/iron/folic acid (PRENATAL 19 ORAL) Take 1 tablet by mouth once daily.         Allergies  Review of patient's allergies indicates:   Allergen Reactions    Adhesive Rash       Physical Examination     Vitals:    24 1327   BP: 124/76   Pulse: 73   Temp: 98.4 °F (36.9 °C)     Physical Exam  Constitutional:       General: She is not in acute distress.     Appearance: She is not ill-appearing.   HENT:      Head: Normocephalic and atraumatic.      Right Ear: Tympanic membrane and ear canal normal.      Left Ear: Tympanic membrane and ear canal normal.      Nose: Nose normal. No congestion or rhinorrhea.   Eyes:      Pupils: Pupils are equal, round, and  reactive to light.   Cardiovascular:      Rate and Rhythm: Normal rate and regular rhythm.      Pulses: Normal pulses.      Heart sounds: No murmur heard.  Pulmonary:      Effort: No respiratory distress.      Breath sounds: No wheezing, rhonchi or rales.   Abdominal:      General: Bowel sounds are normal.      Palpations: Abdomen is soft.      Tenderness: There is no abdominal tenderness.      Hernia: No hernia is present.   Musculoskeletal:      Cervical back: Normal range of motion and neck supple.   Lymphadenopathy:      Cervical: No cervical adenopathy.   Skin:     General: Skin is warm and dry.   Neurological:      Mental Status: She is alert.   Psychiatric:         Behavior: Behavior normal.         Thought Content: Thought content normal.          Assessment and Plan (including Health Maintenance)   :    Plan:         Health Maintenance Due   Topic Date Due    Lipid Panel  Never done    COVID-19 Vaccine (1) Never done    Influenza Vaccine (1) 09/01/2023       Problem List Items Addressed This Visit    None  Visit Diagnoses       Positive pregnancy test    -  Primary    Relevant Orders    hCG, Total, Quantitative (Completed)          Positive pregnancy test  -     hCG, Total, Quantitative; Future; Expected date: 03/11/2024       Health Maintenance Topics with due status: Not Due       Topic Last Completion Date    Hemoglobin A1c (Diabetic Prevention Screening) 04/11/2022    TETANUS VACCINE 10/15/2022    Cervical Cancer Screening 04/04/2023    RSV Vaccine (Age 60+ and Pregnant patients) Not Due       Procedures     Future Appointments   Date Time Provider Department Center   3/18/2024  1:15 PM Mario Foster MD [facility]   4/10/2024 10:30 AM Mario Foster MD [facility]        No follow-ups on file.       Signature:  Braxton Ornelas MD  [facility]  [address] [place] [address]  [phone] Phone  [phone] Fax    Date of  encounter: 3/11/24

## 2024-03-12 NOTE — PROGRESS NOTES
Has gone back down. Follow up with ob next week. Recheck on Thursday of this week.  Let me know if any changes in the mean time.

## 2024-03-18 ENCOUNTER — OFFICE VISIT (OUTPATIENT)
Dept: OBSTETRICS AND GYNECOLOGY | Facility: CLINIC | Age: 39
End: 2024-03-18
Payer: MEDICAID

## 2024-03-18 ENCOUNTER — PROCEDURE VISIT (OUTPATIENT)
Dept: OBSTETRICS AND GYNECOLOGY | Facility: CLINIC | Age: 39
End: 2024-03-18
Payer: MEDICAID

## 2024-03-18 VITALS
SYSTOLIC BLOOD PRESSURE: 136 MMHG | DIASTOLIC BLOOD PRESSURE: 90 MMHG | HEART RATE: 76 BPM | WEIGHT: 268 LBS | BODY MASS INDEX: 38.45 KG/M2

## 2024-03-18 DIAGNOSIS — O03.4 INEVITABLE ABORTION: Primary | ICD-10-CM

## 2024-03-18 DIAGNOSIS — N93.9 VAGINAL BLEEDING: ICD-10-CM

## 2024-03-18 PROBLEM — O20.0 THREATENED MISCARRIAGE: Status: ACTIVE | Noted: 2024-03-18

## 2024-03-18 PROBLEM — O20.0 THREATENED MISCARRIAGE: Status: RESOLVED | Noted: 2024-03-18 | Resolved: 2024-03-18

## 2024-03-18 LAB — HCG SERPL-ACNC: 7419 MIU/ML

## 2024-03-18 PROCEDURE — 99499 UNLISTED E&M SERVICE: CPT | Mod: ,,, | Performed by: OBSTETRICS & GYNECOLOGY

## 2024-03-18 PROCEDURE — 76856 US EXAM PELVIC COMPLETE: CPT | Mod: ,,, | Performed by: OBSTETRICS & GYNECOLOGY

## 2024-03-18 PROCEDURE — 3008F BODY MASS INDEX DOCD: CPT | Mod: ,,, | Performed by: OBSTETRICS & GYNECOLOGY

## 2024-03-18 PROCEDURE — 84702 CHORIONIC GONADOTROPIN TEST: CPT | Mod: ,,, | Performed by: CLINICAL MEDICAL LABORATORY

## 2024-03-18 PROCEDURE — 36415 COLL VENOUS BLD VENIPUNCTURE: CPT | Mod: 90,,, | Performed by: OBSTETRICS & GYNECOLOGY

## 2024-03-18 PROCEDURE — 99214 OFFICE O/P EST MOD 30 MIN: CPT | Mod: ,,, | Performed by: OBSTETRICS & GYNECOLOGY

## 2024-03-18 NOTE — PROGRESS NOTES
History & Physical    SUBJECTIVE:     History of Present Illness:  Patient is a 38 y.o. female presents with possiable miscarraige. Onset of symptoms was abrupt starting a few days ago with unchanged course since that time.Pt states 2024 was her LMP then 2024 she had 3 days of bleeding that was brown/ Pt states she has labs from her PCP and [facility]. Pt had US today.    Chief Complaint   Patient presents with    Threatened Miscarriage    Vaginal Bleeding       Review of patient's allergies indicates:   Allergen Reactions    Adhesive Rash       Current Outpatient Medications   Medication Sig Dispense Refill    calcium carbonate (OS-JAVED) 600 mg calcium (1,500 mg) Tab Take 600 mg by mouth once daily.      omega-3 fatty acids/fish oil (FISH OIL-OMEGA-3 FATTY ACIDS) 300-1,000 mg capsule Take 2 capsules by mouth once daily.      prenatal no115/iron/folic acid (PRENATAL 19 ORAL) Take 1 tablet by mouth once daily.      sertraline (ZOLOFT) 25 MG tablet Take 1 tablet (25 mg total) by mouth once daily. (Patient taking differently: Take 25 mg by mouth once daily. Takes prn) 90 tablet 0     No current facility-administered medications for this visit.       Past Medical History:   Diagnosis Date    Anxiety disorder, unspecified     History of COVID-19     Scoliosis     chronic back pain     Past Surgical History:   Procedure Laterality Date     SECTION  2021     SECTION N/A 10/13/2022    Procedure:  SECTION;  Surgeon: Mario Foster MD;  Location: [facility] L&D;  Service: OB/GYN;  Laterality: N/A;     Family History   Problem Relation Age of Onset    Diabetes Paternal Grandfather     Breast cancer Maternal Grandfather     Diabetes Maternal Grandfather     Ankylosing spondylitis Mother     Sjogren's syndrome Mother     Osteoarthritis Mother     Irritable bowel syndrome Mother     Raynaud syndrome Mother     Gestational diabetes Brother     Breast cancer Maternal Aunt      Social  History     Tobacco Use    Smoking status: Former     Passive exposure: Never    Smokeless tobacco: Never   Substance Use Topics    Alcohol use: Not Currently     Comment: Socially    Drug use: Not Currently        Review of Systems:  Review of Systems   Constitutional:  Negative for appetite change, chills, fatigue and fever.   HENT: Negative.     Eyes: Negative.    Respiratory:  Negative for cough, chest tightness and shortness of breath.    Cardiovascular:  Negative for chest pain, palpitations and leg swelling.   Gastrointestinal:  Negative for abdominal distention, abdominal pain, blood in stool, constipation, diarrhea, nausea and vomiting.   Endocrine: Negative for cold intolerance, heat intolerance, polydipsia, polyphagia and polyuria.   Genitourinary:  Positive for vaginal bleeding. Negative for difficulty urinating, dyspareunia, dysuria, flank pain, frequency, pelvic pain, urgency, vaginal discharge and vaginal pain.   Musculoskeletal: Negative.    Skin: Negative.    Neurological: Negative.    Psychiatric/Behavioral: Negative.  Negative for agitation, behavioral problems, confusion and sleep disturbance. The patient is not nervous/anxious.        OBJECTIVE:     Vital Signs (Most Recent)  Pulse: 76 (03/18/24 1407)  BP: (!) 136/90 (03/18/24 1407)     121.6 kg (268 lb)     Physical Exam:  Physical Exam  Vitals reviewed. Exam conducted with a chaperone present.   Constitutional:       Appearance: Normal appearance.   HENT:      Head: Normocephalic and atraumatic.      Mouth/Throat:      Mouth: Mucous membranes are moist.   Eyes:      Extraocular Movements: Extraocular movements intact.      Pupils: Pupils are equal, round, and reactive to light.   Cardiovascular:      Rate and Rhythm: Normal rate and regular rhythm.      Pulses: Normal pulses.      Heart sounds: Normal heart sounds.   Pulmonary:      Effort: Pulmonary effort is normal.      Breath sounds: Normal breath sounds.   Abdominal:      General: Abdomen  is flat. Bowel sounds are normal.      Palpations: Abdomen is soft.   Musculoskeletal:         General: Normal range of motion.      Cervical back: Normal range of motion.   Skin:     General: Skin is warm and dry.   Neurological:      General: No focal deficit present.      Mental Status: She is alert and oriented to person, place, and time.   Psychiatric:         Mood and Affect: Mood normal.         Behavior: Behavior normal.         Thought Content: Thought content normal.         Judgment: Judgment normal.           Diagnostic Results:  US: Reviewed      ASSESSMENT/PLAN:   Sidra was seen today for threatened miscarriage and vaginal bleeding.    Diagnoses and all orders for this visit:    Inevitable   -     US OB <14 Wks, TransAbd, Single Gestation; Future  -     hCG, Total, Quantitative; Future  -     US Pelvis Complete Non OB; Future  -     hCG, Total, Quantitative    Vaginal bleeding  -     US OB <14 Wks, TransAbd, Single Gestation; Future  -     hCG, Total, Quantitative; Future  -     US Pelvis Complete Non OB; Future  -     hCG, Total, Quantitative        PLAN:Plan      Discussed with the pt the ultrasound findings  Treatment options both conservative and interventional presented. Pt ge rios to manage conservatively at this time  R/B/A reviewed . All questions answered.

## 2024-03-24 ENCOUNTER — HOSPITAL ENCOUNTER (EMERGENCY)
Facility: HOSPITAL | Age: 39
Discharge: HOME | End: 2024-03-24
Attending: EMERGENCY MEDICINE
Payer: MEDICAID

## 2024-03-24 VITALS
WEIGHT: 263 LBS | HEIGHT: 70 IN | TEMPERATURE: 98 F | BODY MASS INDEX: 37.65 KG/M2 | DIASTOLIC BLOOD PRESSURE: 91 MMHG | HEART RATE: 88 BPM | OXYGEN SATURATION: 97 % | SYSTOLIC BLOOD PRESSURE: 136 MMHG | RESPIRATION RATE: 17 BRPM

## 2024-03-24 DIAGNOSIS — O03.9 MISCARRIAGE: Primary | ICD-10-CM

## 2024-03-24 DIAGNOSIS — N93.9 VAGINAL BLEEDING: ICD-10-CM

## 2024-03-24 LAB
ALBUMIN SERPL BCP-MCNC: 3.5 G/DL (ref 3.5–5)
ALBUMIN/GLOB SERPL: 1.1 {RATIO}
ALP SERPL-CCNC: 200 U/L (ref 37–98)
ALT SERPL W P-5'-P-CCNC: 62 U/L (ref 13–56)
ANION GAP SERPL CALCULATED.3IONS-SCNC: 9 MMOL/L (ref 7–16)
AST SERPL W P-5'-P-CCNC: 44 U/L (ref 15–37)
BACTERIA #/AREA URNS HPF: ABNORMAL /HPF
BASOPHILS # BLD AUTO: 0.07 K/UL (ref 0–0.2)
BASOPHILS NFR BLD AUTO: 0.8 % (ref 0–1)
BILIRUB SERPL-MCNC: 0.4 MG/DL (ref ?–1.2)
BILIRUB UR QL STRIP: NEGATIVE
BUN SERPL-MCNC: 8 MG/DL (ref 7–18)
BUN/CREAT SERPL: 9 (ref 6–20)
CALCIUM SERPL-MCNC: 8.7 MG/DL (ref 8.5–10.1)
CHLORIDE SERPL-SCNC: 108 MMOL/L (ref 98–107)
CLARITY UR: CLEAR
CO2 SERPL-SCNC: 28 MMOL/L (ref 21–32)
COLOR UR: YELLOW
CREAT SERPL-MCNC: 0.94 MG/DL (ref 0.55–1.02)
DIFFERENTIAL METHOD BLD: ABNORMAL
EGFR (NO RACE VARIABLE) (RUSH/TITUS): 81 ML/MIN/1.73M2
EOSINOPHIL # BLD AUTO: 0.25 K/UL (ref 0–0.5)
EOSINOPHIL NFR BLD AUTO: 2.8 % (ref 1–4)
ERYTHROCYTE [DISTWIDTH] IN BLOOD BY AUTOMATED COUNT: 14.6 % (ref 11.5–14.5)
GLOBULIN SER-MCNC: 3.3 G/DL (ref 2–4)
GLUCOSE SERPL-MCNC: 147 MG/DL (ref 74–106)
GLUCOSE UR STRIP-MCNC: NORMAL MG/DL
HCG SERPL-ACNC: 3040 MIU/ML
HCT VFR BLD AUTO: 35.7 % (ref 38–47)
HGB BLD-MCNC: 11.5 G/DL (ref 12–16)
IMM GRANULOCYTES # BLD AUTO: 0.03 K/UL (ref 0–0.04)
IMM GRANULOCYTES NFR BLD: 0.3 % (ref 0–0.4)
KETONES UR STRIP-SCNC: NEGATIVE MG/DL
LEUKOCYTE ESTERASE UR QL STRIP: NEGATIVE
LYMPHOCYTES # BLD AUTO: 2.95 K/UL (ref 1–4.8)
LYMPHOCYTES NFR BLD AUTO: 32.5 % (ref 27–41)
MCH RBC QN AUTO: 27.1 PG (ref 27–31)
MCHC RBC AUTO-ENTMCNC: 32.2 G/DL (ref 32–36)
MCV RBC AUTO: 84.2 FL (ref 80–96)
MONOCYTES # BLD AUTO: 0.56 K/UL (ref 0–0.8)
MONOCYTES NFR BLD AUTO: 6.2 % (ref 2–6)
MPC BLD CALC-MCNC: 10.2 FL (ref 9.4–12.4)
MUCOUS, UA: ABNORMAL /LPF
NEUTROPHILS # BLD AUTO: 5.21 K/UL (ref 1.8–7.7)
NEUTROPHILS NFR BLD AUTO: 57.4 % (ref 53–65)
NITRITE UR QL STRIP: NEGATIVE
NRBC # BLD AUTO: 0 X10E3/UL
NRBC, AUTO (.00): 0 %
PH UR STRIP: 5.5 PH UNITS
PLATELET # BLD AUTO: 344 K/UL (ref 150–400)
POTASSIUM SERPL-SCNC: 4.3 MMOL/L (ref 3.5–5.1)
PROT SERPL-MCNC: 6.8 G/DL (ref 6.4–8.2)
PROT UR QL STRIP: 30
RBC # BLD AUTO: 4.24 M/UL (ref 4.2–5.4)
RBC # UR STRIP: ABNORMAL /UL
RBC #/AREA URNS HPF: 10 /HPF
SODIUM SERPL-SCNC: 141 MMOL/L (ref 136–145)
SP GR UR STRIP: 1.03
UROBILINOGEN UR STRIP-ACNC: NORMAL MG/DL
WBC # BLD AUTO: 9.07 K/UL (ref 4.5–11)
WBC #/AREA URNS HPF: <2 /HPF

## 2024-03-24 PROCEDURE — 99284 EMERGENCY DEPT VISIT MOD MDM: CPT | Mod: 25

## 2024-03-24 PROCEDURE — 84702 CHORIONIC GONADOTROPIN TEST: CPT | Performed by: EMERGENCY MEDICINE

## 2024-03-24 PROCEDURE — 80053 COMPREHEN METABOLIC PANEL: CPT | Performed by: EMERGENCY MEDICINE

## 2024-03-24 PROCEDURE — 99284 EMERGENCY DEPT VISIT MOD MDM: CPT | Mod: ,,, | Performed by: EMERGENCY MEDICINE

## 2024-03-24 PROCEDURE — 85025 COMPLETE CBC W/AUTO DIFF WBC: CPT | Performed by: EMERGENCY MEDICINE

## 2024-03-24 PROCEDURE — 81003 URINALYSIS AUTO W/O SCOPE: CPT | Performed by: EMERGENCY MEDICINE

## 2024-03-24 PROCEDURE — 25000003 PHARM REV CODE 250: Performed by: EMERGENCY MEDICINE

## 2024-03-24 RX ORDER — MISOPROSTOL 200 UG/1
400 TABLET ORAL
Status: COMPLETED | OUTPATIENT
Start: 2024-03-24 | End: 2024-03-24

## 2024-03-24 RX ADMIN — MISOPROSTOL 400 MCG: 200 TABLET ORAL at 01:03

## 2024-03-24 NOTE — ED PROVIDER NOTES
Encounter Date: 3/24/2024    SCRIBE #1 NOTE: I, Ruth Haines, am scribing for, and in the presence of,  Natan Buchanan MD. I have scribed the entire note.       History     Chief Complaint   Patient presents with    Vaginal Bleeding     38 y.o.female presented to the ED for vaginal bleeding. PT stated that she had a confirmed miscarriage and has been bleeding actively all week and has gotten worse today. PT stated she was eating breakfast and felt two gushes of blood and have currently soaked 2 maxi pads. PT has a HX of smoking. PT has a medical HX of Anxiety disorder, Scoliosis, and Hx of COVID-19. No cough and pain presented in the ED at this time.     The history is provided by the patient. No  was used.     Review of patient's allergies indicates:   Allergen Reactions    Adhesive Rash     Redness, pulls skin off     Past Medical History:   Diagnosis Date    Anxiety disorder, unspecified     History of COVID-19     Scoliosis     chronic back pain     Past Surgical History:   Procedure Laterality Date     SECTION  2021     SECTION N/A 10/13/2022    Procedure:  SECTION;  Surgeon: Mario Foster MD;  Location: [facility] L&D;  Service: OB/GYN;  Laterality: N/A;    CLAVICLE SURGERY       Family History   Problem Relation Age of Onset    Diabetes Paternal Grandfather     Breast cancer Maternal Grandfather     Diabetes Maternal Grandfather     Ankylosing spondylitis Mother     Sjogren's syndrome Mother     Osteoarthritis Mother     Irritable bowel syndrome Mother     Raynaud syndrome Mother     Gestational diabetes Brother     Breast cancer Maternal Aunt      Social History     Tobacco Use    Smoking status: Former     Passive exposure: Never    Smokeless tobacco: Never   Substance Use Topics    Alcohol use: Not Currently     Comment: Socially    Drug use: Not Currently     Review of Systems   Genitourinary:  Positive for vaginal bleeding (PT stated she has a  confirmed Miscarriage). Negative for vaginal pain.   All other systems reviewed and are negative.      Physical Exam     Initial Vitals [03/24/24 1153]   BP Pulse Resp Temp SpO2   (!) 136/91 88 17 98 °F (36.7 °C) 97 %      MAP       --         Physical Exam    Nursing note and vitals reviewed.  Constitutional: She appears well-developed and well-nourished.   HENT:   Head: Normocephalic and atraumatic.   Eyes: Conjunctivae and EOM are normal. Pupils are equal, round, and reactive to light.   Neck: Neck supple.   Normal range of motion.  Cardiovascular:  Normal rate, regular rhythm, normal heart sounds and intact distal pulses.           Pulmonary/Chest: Breath sounds normal.   Abdominal: Abdomen is soft. Bowel sounds are normal.   Genitourinary:    Genitourinary Comments: Actively bleeding      Musculoskeletal:         General: Normal range of motion.      Cervical back: Normal range of motion and neck supple.     Neurological: She is alert and oriented to person, place, and time. She has normal strength.   Skin: Skin is warm and dry. Capillary refill takes less than 2 seconds.   Psychiatric: She has a normal mood and affect. Thought content normal.         ED Course   Procedures  Labs Reviewed   COMPREHENSIVE METABOLIC PANEL - Abnormal; Notable for the following components:       Result Value    Chloride 108 (*)     Glucose 147 (*)     Alk Phos 200 (*)     ALT 62 (*)     AST 44 (*)     All other components within normal limits   URINALYSIS, REFLEX TO URINE CULTURE - Abnormal; Notable for the following components:    Protein, UA 30 (*)     Blood, UA Moderate (*)     All other components within normal limits   CBC WITH DIFFERENTIAL - Abnormal; Notable for the following components:    Hemoglobin 11.5 (*)     Hematocrit 35.7 (*)     RDW 14.6 (*)     Monocytes % 6.2 (*)     All other components within normal limits   URINALYSIS, MICROSCOPIC - Abnormal; Notable for the following components:    RBC, UA 10 (*)     Bacteria,  UA Occasional (*)     Mucous Moderate (*)     All other components within normal limits   CBC W/ AUTO DIFFERENTIAL    Narrative:     The following orders were created for panel order CBC auto differential.  Procedure                               Abnormality         Status                     ---------                               -----------         ------                     CBC with Differential[[id number]]       Abnormal            Final result                 Please view results for these tests on the individual orders.   HCG, TOTAL, QUANTITATIVE          Imaging Results              US OB <14 Wks, TransAbd, Single Gestation (Final result)  Result time 24 13:09:12      Final result by Dany Sultana MD (24 13:09:12)                   Impression:      Heterogeneous endometrial stripe nonspecific.  This could represent sequela of the history of spontaneous .  No gestational sac detected.  No free fluid.  Correlate with patient's symptoms and continued follow-up may be needed.      Electronically signed by: Dany Sultana  Date:    2024  Time:    13:09               Narrative:    EXAMINATION:  US OB <14 WEEKS TRANSABDOM, SINGLE GESTATION    CLINICAL HISTORY:  Abnormal uterine and vaginal bleeding, unspecified    TECHNIQUE:  Transabdominal grayscale and color Doppler ultrasound performed of the maternal uterus and adnexa.  Real-time ultrasound images captured and stored.    COMPARISON:  None    FINDINGS:  Uterus measures 9.9 x 6.2 x 6.4 cm.  The endometrial stripe is prominent and heterogeneous.  There is no gestational sac detected.  The ovaries are difficult to visualize due bowel gas.  No free fluid.                                    X-Rays:   Independently Interpreted Readings:   Other Readings:   US OB <14 Wks, TransAbd, Single Gestation      Impression:     Heterogeneous endometrial stripe nonspecific.  This could represent sequela of the history of spontaneous .  No gestational  sac detected.  No free fluid.  Correlate with patient's symptoms and continued follow-up may be needed.      Medications   miSOPROStoL tablet 400 mcg (400 mcg Oral Given 3/24/24 1350)     Medical Decision Making  VAGINAL BLEEDING    DDX:  CONFIRMED MISCARRIAGE +/- RETAINED PRODUCTS        Amount and/or Complexity of Data Reviewed  Labs: ordered.  Radiology: ordered.    Risk  Prescription drug management.              Attending Attestation:           Physician Attestation for Scribe:  Physician Attestation Statement for Scribe #1: I, Natan Buchanan MD, reviewed documentation, as scribed by Ruth Haines in my presence, and it is both accurate and complete.             ED Course as of 24   Sun Mar 24, 2024   1339 US OB <14 Wks, TransAbd, Single Gestation      Impression:     Heterogeneous endometrial stripe nonspecific.  This could represent sequela of the history of spontaneous .  No gestational sac detected.  No free fluid.  Correlate with patient's symptoms and continued follow-up may be needed.   [CH]      ED Course User Index  [CH] Ruth Haines                           Clinical Impression:  Final diagnoses:  [N93.9] Vaginal bleeding  [O03.9] Miscarriage (Primary)          ED Disposition Condition    Discharge Stable          ED Prescriptions    None       Follow-up Information       Follow up With Specialties Details Why Contact Info    Braxton Ornelas MD Family Medicine  As needed [address] [facility] [address] [phone]               Natan Buchanan MD  24

## 2024-03-24 NOTE — DISCHARGE INSTRUCTIONS
CONTACT DR ANGULO REGARDING FOLLOW UP FIRST THING TOMORROW MORNING.  RETURN TO THE EMERGENCY DEPARTMENT IF BLEEDING PERSISTS OR WORSENS OR OTHERWISE AS NEEDED.

## 2024-03-25 ENCOUNTER — TELEPHONE (OUTPATIENT)
Dept: EMERGENCY MEDICINE | Facility: HOSPITAL | Age: 39
End: 2024-03-25
Payer: MEDICAID

## 2024-03-26 ENCOUNTER — PROCEDURE VISIT (OUTPATIENT)
Dept: OBSTETRICS AND GYNECOLOGY | Facility: CLINIC | Age: 39
End: 2024-03-26
Payer: MEDICAID

## 2024-03-26 ENCOUNTER — OFFICE VISIT (OUTPATIENT)
Dept: OBSTETRICS AND GYNECOLOGY | Facility: CLINIC | Age: 39
End: 2024-03-26
Payer: MEDICAID

## 2024-03-26 VITALS
SYSTOLIC BLOOD PRESSURE: 120 MMHG | WEIGHT: 266.63 LBS | DIASTOLIC BLOOD PRESSURE: 90 MMHG | BODY MASS INDEX: 38.25 KG/M2 | HEART RATE: 85 BPM

## 2024-03-26 DIAGNOSIS — O03.4 INCOMPLETE ABORTION: Primary | ICD-10-CM

## 2024-03-26 DIAGNOSIS — Z01.818 PREOP TESTING: ICD-10-CM

## 2024-03-26 LAB
ALBUMIN SERPL BCP-MCNC: 3.8 G/DL (ref 3.5–5)
ALBUMIN/GLOB SERPL: 1.2 {RATIO}
ALP SERPL-CCNC: 196 U/L (ref 37–98)
ALT SERPL W P-5'-P-CCNC: 53 U/L (ref 13–56)
ANION GAP SERPL CALCULATED.3IONS-SCNC: 10 MMOL/L (ref 7–16)
AST SERPL W P-5'-P-CCNC: 44 U/L (ref 15–37)
BASOPHILS # BLD AUTO: 0.07 K/UL (ref 0–0.2)
BASOPHILS NFR BLD AUTO: 0.7 % (ref 0–1)
BILIRUB SERPL-MCNC: 0.3 MG/DL (ref ?–1.2)
BILIRUB UR QL STRIP: NEGATIVE
BUN SERPL-MCNC: 8 MG/DL (ref 7–18)
BUN/CREAT SERPL: 12 (ref 6–20)
CALCIUM SERPL-MCNC: 9.8 MG/DL (ref 8.5–10.1)
CHLORIDE SERPL-SCNC: 110 MMOL/L (ref 98–107)
CLARITY UR: ABNORMAL
CO2 SERPL-SCNC: 28 MMOL/L (ref 21–32)
COLOR UR: YELLOW
CREAT SERPL-MCNC: 0.68 MG/DL (ref 0.55–1.02)
DIFFERENTIAL METHOD BLD: ABNORMAL
EGFR (NO RACE VARIABLE) (RUSH/TITUS): 116 ML/MIN/1.73M2
EOSINOPHIL # BLD AUTO: 0.37 K/UL (ref 0–0.5)
EOSINOPHIL NFR BLD AUTO: 3.5 % (ref 1–4)
ERYTHROCYTE [DISTWIDTH] IN BLOOD BY AUTOMATED COUNT: 14.7 % (ref 11.5–14.5)
GLOBULIN SER-MCNC: 3.2 G/DL (ref 2–4)
GLUCOSE SERPL-MCNC: 88 MG/DL (ref 74–106)
GLUCOSE UR STRIP-MCNC: NORMAL MG/DL
HCG SERPL-ACNC: 2048 MIU/ML
HCT VFR BLD AUTO: 35.7 % (ref 38–47)
HGB BLD-MCNC: 10.5 G/DL (ref 12–16)
IMM GRANULOCYTES # BLD AUTO: 0.02 K/UL (ref 0–0.04)
IMM GRANULOCYTES NFR BLD: 0.2 % (ref 0–0.4)
INDIRECT COOMBS: NORMAL
KETONES UR STRIP-SCNC: NEGATIVE MG/DL
LEUKOCYTE ESTERASE UR QL STRIP: NEGATIVE
LYMPHOCYTES # BLD AUTO: 4.16 K/UL (ref 1–4.8)
LYMPHOCYTES NFR BLD AUTO: 39.1 % (ref 27–41)
MCH RBC QN AUTO: 26.3 PG (ref 27–31)
MCHC RBC AUTO-ENTMCNC: 29.4 G/DL (ref 32–36)
MCV RBC AUTO: 89.5 FL (ref 80–96)
MONOCYTES # BLD AUTO: 0.57 K/UL (ref 0–0.8)
MONOCYTES NFR BLD AUTO: 5.4 % (ref 2–6)
MPC BLD CALC-MCNC: 11 FL (ref 9.4–12.4)
MUCOUS, UA: ABNORMAL /LPF
NEUTROPHILS # BLD AUTO: 5.46 K/UL (ref 1.8–7.7)
NEUTROPHILS NFR BLD AUTO: 51.1 % (ref 53–65)
NITRITE UR QL STRIP: NEGATIVE
NRBC # BLD AUTO: 0 X10E3/UL
NRBC, AUTO (.00): 0 %
PH UR STRIP: 5.5 PH UNITS
PLATELET # BLD AUTO: 354 K/UL (ref 150–400)
POTASSIUM SERPL-SCNC: 4.8 MMOL/L (ref 3.5–5.1)
PROT SERPL-MCNC: 7 G/DL (ref 6.4–8.2)
PROT UR QL STRIP: 20
RBC # BLD AUTO: 3.99 M/UL (ref 4.2–5.4)
RBC # UR STRIP: ABNORMAL /UL
RBC #/AREA URNS HPF: >182 /HPF
RH BLD: NORMAL
SODIUM SERPL-SCNC: 143 MMOL/L (ref 136–145)
SP GR UR STRIP: 1.02
SPECIMEN OUTDATE: NORMAL
SQUAMOUS #/AREA URNS LPF: ABNORMAL /HPF
UROBILINOGEN UR STRIP-ACNC: NORMAL MG/DL
WBC # BLD AUTO: 10.65 K/UL (ref 4.5–11)
WBC #/AREA URNS HPF: <1 /HPF

## 2024-03-26 PROCEDURE — 3008F BODY MASS INDEX DOCD: CPT | Mod: ,,, | Performed by: OBSTETRICS & GYNECOLOGY

## 2024-03-26 PROCEDURE — 80053 COMPREHEN METABOLIC PANEL: CPT | Mod: ,,, | Performed by: CLINICAL MEDICAL LABORATORY

## 2024-03-26 PROCEDURE — 76856 US EXAM PELVIC COMPLETE: CPT | Mod: ,,, | Performed by: OBSTETRICS & GYNECOLOGY

## 2024-03-26 PROCEDURE — 84702 CHORIONIC GONADOTROPIN TEST: CPT | Mod: ,,, | Performed by: CLINICAL MEDICAL LABORATORY

## 2024-03-26 PROCEDURE — 86901 BLOOD TYPING SEROLOGIC RH(D): CPT | Mod: ,,, | Performed by: CLINICAL MEDICAL LABORATORY

## 2024-03-26 PROCEDURE — 81001 URINALYSIS AUTO W/SCOPE: CPT | Mod: ,,, | Performed by: CLINICAL MEDICAL LABORATORY

## 2024-03-26 PROCEDURE — 86850 RBC ANTIBODY SCREEN: CPT | Mod: ,,, | Performed by: CLINICAL MEDICAL LABORATORY

## 2024-03-26 PROCEDURE — 99499 UNLISTED E&M SERVICE: CPT | Mod: ,,, | Performed by: OBSTETRICS & GYNECOLOGY

## 2024-03-26 PROCEDURE — 3074F SYST BP LT 130 MM HG: CPT | Mod: ,,, | Performed by: OBSTETRICS & GYNECOLOGY

## 2024-03-26 PROCEDURE — 86900 BLOOD TYPING SEROLOGIC ABO: CPT | Mod: ,,, | Performed by: CLINICAL MEDICAL LABORATORY

## 2024-03-26 PROCEDURE — 99214 OFFICE O/P EST MOD 30 MIN: CPT | Mod: 57,,, | Performed by: OBSTETRICS & GYNECOLOGY

## 2024-03-26 PROCEDURE — 85025 COMPLETE CBC W/AUTO DIFF WBC: CPT | Mod: ,,, | Performed by: CLINICAL MEDICAL LABORATORY

## 2024-03-26 PROCEDURE — 36415 COLL VENOUS BLD VENIPUNCTURE: CPT | Mod: 90,,, | Performed by: OBSTETRICS & GYNECOLOGY

## 2024-03-26 RX ORDER — MISOPROSTOL 200 UG/1
600 TABLET ORAL EVERY 12 HOURS
Qty: 12 TABLET | Refills: 0 | Status: SHIPPED | OUTPATIENT
Start: 2024-03-26 | End: 2024-03-26 | Stop reason: ALTCHOICE

## 2024-03-26 NOTE — H&P (VIEW-ONLY)
History & Physical    SUBJECTIVE:     History of Present Illness:  Patient is a 38 y.o. female Pt states that she experienced really heavy vaginal bleeding with passage of large blood clots over the weekend. She was seen in the ED and ultrasound appears consistent with incomplete AB. Pt states that she is barely in need of a panty liner at this time.     Chief Complaint   Patient presents with    Follow-up       Review of patient's allergies indicates:   Allergen Reactions    Adhesive Rash       Current Outpatient Medications   Medication Sig Dispense Refill    calcium carbonate (OS-JAVED) 600 mg calcium (1,500 mg) Tab Take 600 mg by mouth once daily.      omega-3 fatty acids/fish oil (FISH OIL-OMEGA-3 FATTY ACIDS) 300-1,000 mg capsule Take 2 capsules by mouth once daily.      prenatal no115/iron/folic acid (PRENATAL 19 ORAL) Take 1 tablet by mouth once daily.      sertraline (ZOLOFT) 25 MG tablet Take 1 tablet (25 mg total) by mouth once daily. (Patient taking differently: Take 25 mg by mouth once daily. Takes prn) 90 tablet 0     No current facility-administered medications for this visit.       Past Medical History:   Diagnosis Date    Anxiety disorder, unspecified     History of COVID-19     Scoliosis     chronic back pain     Past Surgical History:   Procedure Laterality Date     SECTION  2021     SECTION N/A 10/13/2022    Procedure:  SECTION;  Surgeon: Mario Foster MD;  Location: [facility] L&D;  Service: OB/GYN;  Laterality: N/A;     Family History   Problem Relation Age of Onset    Diabetes Paternal Grandfather     Breast cancer Maternal Grandfather     Diabetes Maternal Grandfather     Ankylosing spondylitis Mother     Sjogren's syndrome Mother     Osteoarthritis Mother     Irritable bowel syndrome Mother     Raynaud syndrome Mother     Gestational diabetes Brother     Breast cancer Maternal Aunt      Social History     Tobacco Use    Smoking status: Former     Passive  exposure: Never    Smokeless tobacco: Never   Substance Use Topics    Alcohol use: Not Currently     Comment: Socially    Drug use: Not Currently        Review of Systems:  Review of Systems   Constitutional:  Negative for appetite change, chills, fatigue and fever.   HENT: Negative.     Eyes: Negative.    Respiratory:  Negative for cough, chest tightness and shortness of breath.    Cardiovascular:  Negative for chest pain, palpitations and leg swelling.   Gastrointestinal:  Negative for abdominal distention, abdominal pain, blood in stool, constipation, diarrhea, nausea and vomiting.   Endocrine: Negative for cold intolerance, heat intolerance, polydipsia, polyphagia and polyuria.   Genitourinary:  Negative for difficulty urinating, dyspareunia, dysuria, flank pain, frequency, pelvic pain, urgency, vaginal bleeding, vaginal discharge and vaginal pain.   Musculoskeletal: Negative.    Skin: Negative.    Neurological: Negative.    Psychiatric/Behavioral: Negative.  Negative for agitation, behavioral problems, confusion and sleep disturbance. The patient is not nervous/anxious.        OBJECTIVE:     Vital Signs (Most Recent)  Pulse: 85 (03/26/24 1009)  BP: (!) 120/90 (03/26/24 1009)     120.9 kg (266 lb 9.6 oz)     Physical Exam:  Physical Exam  Vitals reviewed. Exam conducted with a chaperone present.   Constitutional:       Appearance: Normal appearance.   HENT:      Head: Normocephalic and atraumatic.      Mouth/Throat:      Mouth: Mucous membranes are moist.   Eyes:      Extraocular Movements: Extraocular movements intact.      Pupils: Pupils are equal, round, and reactive to light.   Cardiovascular:      Rate and Rhythm: Normal rate and regular rhythm.      Pulses: Normal pulses.      Heart sounds: Normal heart sounds.   Pulmonary:      Effort: Pulmonary effort is normal.      Breath sounds: Normal breath sounds.   Abdominal:      General: Abdomen is flat. Bowel sounds are normal.      Palpations: Abdomen is soft.    Musculoskeletal:         General: Normal range of motion.      Cervical back: Normal range of motion.   Skin:     General: Skin is warm and dry.   Neurological:      General: No focal deficit present.      Mental Status: She is alert and oriented to person, place, and time.   Psychiatric:         Mood and Affect: Mood normal.         Behavior: Behavior normal.         Thought Content: Thought content normal.         Judgment: Judgment normal.           Diagnostic Results:  US- Retained POC    ASSESSMENT/PLAN:   Sidra was seen today for follow-up.    Diagnoses and all orders for this visit:    Incomplete   -     US Pelvis Complete Non OB; Future  -     hCG, Total, Quantitative; Future  -     CBC Auto Differential; Future  -     Comprehensive Metabolic Panel; Future  -     Type & Screen; Future  -     hCG, Total, Quantitative  -     CBC Auto Differential  -     Comprehensive Metabolic Panel  -     Type & Screen  -     Discontinue: miSOPROStoL (CYTOTEC) 200 MCG Tab; Place 3 tablets (600 mcg total) vaginally every 12 (twelve) hours. for 2 days  -     Case Request Operating Room: DILATION AND CURETTAGE, UTERUS, USING SUCTION    Preop testing  -     hCG, Total, Quantitative; Future  -     CBC Auto Differential; Future  -     Comprehensive Metabolic Panel; Future  -     Type & Screen; Future  -     Urinalysis; Future  -     hCG, Total, Quantitative  -     CBC Auto Differential  -     Comprehensive Metabolic Panel  -     Type & Screen  -     Urinalysis      There are no hospital problems to display for this patient.      PLAN:Plan      Pt scheduled for Suction D&C on 3/28/2024  Risks, benefits and alternatives to the procedure reviewed with the pt  After discussion of the risk, alternatives, benefits, and indication of surgery, as well as the risk of surgery, questions were sought and answered and informed consent obtained to proceed with surgery. We discussed the risk of the procedures to include, but not be  limited to, 1) bleeding, which could be possibly excessive, potentially requiring a blood transfusion and subsequent risk of hepatitis (1 in 300,000), transfusion reaction (<1%), and HIV (1 in 1,000,000); 2) injury to internal organs including the bowel, bladder, great vessels, nerves, and ureters, potentially requiring further surgery at that time or at a later date; 3) infection requiring a prolonged hospital stay and antibiotics with possible drainage of an abscess or wound breakdown; 4) anesthetic complications; 5) deep venous thrombosis and the risk of pulmonary emboli; 6) pneumonia; and 7) infertility and/or menopause, 8) possible death. All questions were answered and a consent form was signed. She stated she understood the above risks and desired to proceed.   All questions answered to the level of the patient's satisfaction.   Case request and orders done.   Written materials provided  Consent obtained.

## 2024-03-26 NOTE — PROGRESS NOTES
History & Physical    SUBJECTIVE:     History of Present Illness:  Patient is a 38 y.o. female Pt states that she experienced really heavy vaginal bleeding with passage of large blood clots over the weekend. She was seen in the ED and ultrasound appears consistent with incomplete AB. Pt states that she is barely in need of a panty liner at this time.     Chief Complaint   Patient presents with    Follow-up       Review of patient's allergies indicates:   Allergen Reactions    Adhesive Rash       Current Outpatient Medications   Medication Sig Dispense Refill    calcium carbonate (OS-JAVED) 600 mg calcium (1,500 mg) Tab Take 600 mg by mouth once daily.      omega-3 fatty acids/fish oil (FISH OIL-OMEGA-3 FATTY ACIDS) 300-1,000 mg capsule Take 2 capsules by mouth once daily.      prenatal no115/iron/folic acid (PRENATAL 19 ORAL) Take 1 tablet by mouth once daily.      sertraline (ZOLOFT) 25 MG tablet Take 1 tablet (25 mg total) by mouth once daily. (Patient taking differently: Take 25 mg by mouth once daily. Takes prn) 90 tablet 0     No current facility-administered medications for this visit.       Past Medical History:   Diagnosis Date    Anxiety disorder, unspecified     History of COVID-19     Scoliosis     chronic back pain     Past Surgical History:   Procedure Laterality Date     SECTION  2021     SECTION N/A 10/13/2022    Procedure:  SECTION;  Surgeon: Mario Foster MD;  Location: [facility] L&D;  Service: OB/GYN;  Laterality: N/A;     Family History   Problem Relation Age of Onset    Diabetes Paternal Grandfather     Breast cancer Maternal Grandfather     Diabetes Maternal Grandfather     Ankylosing spondylitis Mother     Sjogren's syndrome Mother     Osteoarthritis Mother     Irritable bowel syndrome Mother     Raynaud syndrome Mother     Gestational diabetes Brother     Breast cancer Maternal Aunt      Social History     Tobacco Use    Smoking status: Former     Passive  exposure: Never    Smokeless tobacco: Never   Substance Use Topics    Alcohol use: Not Currently     Comment: Socially    Drug use: Not Currently        Review of Systems:  Review of Systems   Constitutional:  Negative for appetite change, chills, fatigue and fever.   HENT: Negative.     Eyes: Negative.    Respiratory:  Negative for cough, chest tightness and shortness of breath.    Cardiovascular:  Negative for chest pain, palpitations and leg swelling.   Gastrointestinal:  Negative for abdominal distention, abdominal pain, blood in stool, constipation, diarrhea, nausea and vomiting.   Endocrine: Negative for cold intolerance, heat intolerance, polydipsia, polyphagia and polyuria.   Genitourinary:  Negative for difficulty urinating, dyspareunia, dysuria, flank pain, frequency, pelvic pain, urgency, vaginal bleeding, vaginal discharge and vaginal pain.   Musculoskeletal: Negative.    Skin: Negative.    Neurological: Negative.    Psychiatric/Behavioral: Negative.  Negative for agitation, behavioral problems, confusion and sleep disturbance. The patient is not nervous/anxious.        OBJECTIVE:     Vital Signs (Most Recent)  Pulse: 85 (03/26/24 1009)  BP: (!) 120/90 (03/26/24 1009)     120.9 kg (266 lb 9.6 oz)     Physical Exam:  Physical Exam  Vitals reviewed. Exam conducted with a chaperone present.   Constitutional:       Appearance: Normal appearance.   HENT:      Head: Normocephalic and atraumatic.      Mouth/Throat:      Mouth: Mucous membranes are moist.   Eyes:      Extraocular Movements: Extraocular movements intact.      Pupils: Pupils are equal, round, and reactive to light.   Cardiovascular:      Rate and Rhythm: Normal rate and regular rhythm.      Pulses: Normal pulses.      Heart sounds: Normal heart sounds.   Pulmonary:      Effort: Pulmonary effort is normal.      Breath sounds: Normal breath sounds.   Abdominal:      General: Abdomen is flat. Bowel sounds are normal.      Palpations: Abdomen is soft.    Musculoskeletal:         General: Normal range of motion.      Cervical back: Normal range of motion.   Skin:     General: Skin is warm and dry.   Neurological:      General: No focal deficit present.      Mental Status: She is alert and oriented to person, place, and time.   Psychiatric:         Mood and Affect: Mood normal.         Behavior: Behavior normal.         Thought Content: Thought content normal.         Judgment: Judgment normal.           Diagnostic Results:  US- Retained POC    ASSESSMENT/PLAN:   Sidra was seen today for follow-up.    Diagnoses and all orders for this visit:    Incomplete   -     US Pelvis Complete Non OB; Future  -     hCG, Total, Quantitative; Future  -     CBC Auto Differential; Future  -     Comprehensive Metabolic Panel; Future  -     Type & Screen; Future  -     hCG, Total, Quantitative  -     CBC Auto Differential  -     Comprehensive Metabolic Panel  -     Type & Screen  -     Discontinue: miSOPROStoL (CYTOTEC) 200 MCG Tab; Place 3 tablets (600 mcg total) vaginally every 12 (twelve) hours. for 2 days  -     Case Request Operating Room: DILATION AND CURETTAGE, UTERUS, USING SUCTION    Preop testing  -     hCG, Total, Quantitative; Future  -     CBC Auto Differential; Future  -     Comprehensive Metabolic Panel; Future  -     Type & Screen; Future  -     Urinalysis; Future  -     hCG, Total, Quantitative  -     CBC Auto Differential  -     Comprehensive Metabolic Panel  -     Type & Screen  -     Urinalysis      There are no hospital problems to display for this patient.      PLAN:Plan      Pt scheduled for Suction D&C on 3/28/2024  Risks, benefits and alternatives to the procedure reviewed with the pt  After discussion of the risk, alternatives, benefits, and indication of surgery, as well as the risk of surgery, questions were sought and answered and informed consent obtained to proceed with surgery. We discussed the risk of the procedures to include, but not be  limited to, 1) bleeding, which could be possibly excessive, potentially requiring a blood transfusion and subsequent risk of hepatitis (1 in 300,000), transfusion reaction (<1%), and HIV (1 in 1,000,000); 2) injury to internal organs including the bowel, bladder, great vessels, nerves, and ureters, potentially requiring further surgery at that time or at a later date; 3) infection requiring a prolonged hospital stay and antibiotics with possible drainage of an abscess or wound breakdown; 4) anesthetic complications; 5) deep venous thrombosis and the risk of pulmonary emboli; 6) pneumonia; and 7) infertility and/or menopause, 8) possible death. All questions were answered and a consent form was signed. She stated she understood the above risks and desired to proceed.   All questions answered to the level of the patient's satisfaction.   Case request and orders done.   Written materials provided  Consent obtained.

## 2024-03-28 ENCOUNTER — HOSPITAL ENCOUNTER (OUTPATIENT)
Facility: HOSPITAL | Age: 39
Discharge: HOME | End: 2024-03-28
Attending: OBSTETRICS & GYNECOLOGY | Admitting: OBSTETRICS & GYNECOLOGY
Payer: MEDICAID

## 2024-03-28 VITALS
HEIGHT: 70 IN | OXYGEN SATURATION: 100 % | SYSTOLIC BLOOD PRESSURE: 136 MMHG | HEART RATE: 74 BPM | WEIGHT: 276 LBS | BODY MASS INDEX: 39.51 KG/M2 | DIASTOLIC BLOOD PRESSURE: 79 MMHG | TEMPERATURE: 98 F | RESPIRATION RATE: 16 BRPM

## 2024-03-28 PROCEDURE — 63600175 PHARM REV CODE 636 W HCPCS: Performed by: ANESTHESIOLOGY

## 2024-03-28 RX ORDER — HYDROMORPHONE HYDROCHLORIDE 2 MG/ML
0.5 INJECTION, SOLUTION INTRAMUSCULAR; INTRAVENOUS; SUBCUTANEOUS EVERY 5 MIN PRN
Status: CANCELLED | OUTPATIENT
Start: 2024-03-28

## 2024-03-28 RX ORDER — LIDOCAINE HYDROCHLORIDE 10 MG/ML
1 INJECTION INFILTRATION; PERINEURAL ONCE
Status: DISCONTINUED | OUTPATIENT
Start: 2024-03-28 | End: 2024-03-28 | Stop reason: HOSPADM

## 2024-03-28 RX ORDER — MEPERIDINE HYDROCHLORIDE 25 MG/ML
25 INJECTION INTRAMUSCULAR; INTRAVENOUS; SUBCUTANEOUS EVERY 10 MIN PRN
Status: CANCELLED | OUTPATIENT
Start: 2024-03-28 | End: 2024-03-28

## 2024-03-28 RX ORDER — SODIUM CHLORIDE, SODIUM LACTATE, POTASSIUM CHLORIDE, CALCIUM CHLORIDE 600; 310; 30; 20 MG/100ML; MG/100ML; MG/100ML; MG/100ML
125 INJECTION, SOLUTION INTRAVENOUS CONTINUOUS
Status: CANCELLED | OUTPATIENT
Start: 2024-03-28

## 2024-03-28 RX ORDER — DIPHENHYDRAMINE HYDROCHLORIDE 50 MG/ML
25 INJECTION INTRAMUSCULAR; INTRAVENOUS EVERY 6 HOURS PRN
Status: CANCELLED | OUTPATIENT
Start: 2024-03-28

## 2024-03-28 RX ORDER — MORPHINE SULFATE 10 MG/ML
4 INJECTION INTRAMUSCULAR; INTRAVENOUS; SUBCUTANEOUS EVERY 5 MIN PRN
Status: CANCELLED | OUTPATIENT
Start: 2024-03-28

## 2024-03-28 RX ORDER — MELOXICAM 15 MG/1
15 TABLET ORAL DAILY
COMMUNITY

## 2024-03-28 RX ORDER — SODIUM CHLORIDE, SODIUM LACTATE, POTASSIUM CHLORIDE, CALCIUM CHLORIDE 600; 310; 30; 20 MG/100ML; MG/100ML; MG/100ML; MG/100ML
INJECTION, SOLUTION INTRAVENOUS CONTINUOUS
Status: DISCONTINUED | OUTPATIENT
Start: 2024-03-28 | End: 2024-03-28 | Stop reason: HOSPADM

## 2024-03-28 RX ORDER — ONDANSETRON HYDROCHLORIDE 2 MG/ML
4 INJECTION, SOLUTION INTRAVENOUS DAILY PRN
Status: CANCELLED | OUTPATIENT
Start: 2024-03-28

## 2024-03-28 RX ORDER — OXYCODONE HYDROCHLORIDE 5 MG/1
5 TABLET ORAL
Status: CANCELLED | OUTPATIENT
Start: 2024-03-28

## 2024-03-28 RX ADMIN — SODIUM CHLORIDE, POTASSIUM CHLORIDE, SODIUM LACTATE AND CALCIUM CHLORIDE: 600; 310; 30; 20 INJECTION, SOLUTION INTRAVENOUS at 07:03

## 2024-03-28 NOTE — PLAN OF CARE
Patient states she had previous intermittent pain to right side but the pain worsened when she turned in bed and it popped.  Dr Mckeon came in and spoke with patient.  Surgery cancelled and patient advised to go to ER.

## 2024-04-01 ENCOUNTER — OFFICE VISIT (OUTPATIENT)
Dept: OBSTETRICS AND GYNECOLOGY | Facility: CLINIC | Age: 39
End: 2024-04-01
Payer: MEDICAID

## 2024-04-01 VITALS
WEIGHT: 265.19 LBS | BODY MASS INDEX: 38.05 KG/M2 | SYSTOLIC BLOOD PRESSURE: 140 MMHG | DIASTOLIC BLOOD PRESSURE: 95 MMHG | HEART RATE: 74 BPM

## 2024-04-01 DIAGNOSIS — O03.4 INCOMPLETE ABORTION: Primary | ICD-10-CM

## 2024-04-01 PROCEDURE — 3008F BODY MASS INDEX DOCD: CPT | Mod: ,,, | Performed by: OBSTETRICS & GYNECOLOGY

## 2024-04-01 PROCEDURE — 99214 OFFICE O/P EST MOD 30 MIN: CPT | Mod: ,,, | Performed by: OBSTETRICS & GYNECOLOGY

## 2024-04-01 PROCEDURE — 3077F SYST BP >= 140 MM HG: CPT | Mod: ,,, | Performed by: OBSTETRICS & GYNECOLOGY

## 2024-04-01 RX ORDER — MISOPROSTOL 200 UG/1
600 TABLET ORAL EVERY 12 HOURS
Qty: 6 TABLET | Refills: 0 | Status: SHIPPED | OUTPATIENT
Start: 2024-04-01 | End: 2024-05-08

## 2024-04-01 NOTE — PROGRESS NOTES
Subjective     Patient ID: Sidra Winter is a 38 y.o. female.    Chief Complaint:  reschedule surgery      History of Present Illness  HPI  Patient here to follow up from ER visit on 2024/ Patient was planned for surgery when her back messed up and patient was sent to the ER.     GYN & OB History  Patient's last menstrual period was 2024.   Date of Last Pap: No result found    OB History    Para Term  AB Living   3 2 2     2   SAB IAB Ectopic Multiple Live Births         0 2      # Outcome Date GA Lbr Matt/2nd Weight Sex Type Anes PTL Lv   3             2 Term 10/13/22 39w0d  3.886 kg (8 lb 9.1 oz) M CS-LTranv Spinal  RODY   1 Term 21 39w0d  3.969 kg (8 lb 12 oz) F CS-Unspec   RODY       Review of Systems  Review of Systems   Constitutional:  Negative for activity change, appetite change, fatigue and unexpected weight change.   HENT: Negative.     Respiratory:  Negative for cough, shortness of breath and wheezing.    Cardiovascular:  Negative for chest pain, palpitations and leg swelling.   Gastrointestinal:  Negative for abdominal pain, bloating, blood in stool, constipation, diarrhea, nausea, vomiting and reflux.   Genitourinary:  Negative for bladder incontinence, decreased libido, dysmenorrhea, dyspareunia, dysuria, flank pain, frequency, menorrhagia, menstrual problem, pelvic pain, urgency, vaginal bleeding, vaginal discharge, postcoital bleeding and vaginal odor.   Musculoskeletal:  Negative for back pain.   Integumentary:  Negative for rash, acne, hair changes, mole/lesion, breast mass, nipple discharge, breast skin changes and breast tenderness.   Neurological:  Negative for headaches.   Psychiatric/Behavioral:  Negative for depression and sleep disturbance. The patient is not nervous/anxious.    Breast: Negative for asymmetry, breast self exam, lump, mass, nipple discharge, skin changes and tenderness         Objective   Physical Exam:   Constitutional: She is  oriented to person, place, and time. She appears well-developed and well-nourished.    HENT:   Head: Normocephalic and atraumatic.    Eyes: Pupils are equal, round, and reactive to light. EOM are normal.     Cardiovascular:  Normal rate, regular rhythm and normal heart sounds.             Pulmonary/Chest: Effort normal and breath sounds normal.        Abdominal: Soft. Bowel sounds are normal.             Musculoskeletal: Normal range of motion and moves all extremeties.       Neurological: She is alert and oriented to person, place, and time. She has normal reflexes.     Psychiatric: She has a normal mood and affect. Her behavior is normal. Judgment and thought content normal.            Assessment and Plan     1. Incomplete        Patient went to the ER and was told she was having a back spasm and was released for surgery. Patient questioned if she should just take the medication instead of surgery. Reviewed with the patient about taking the medication. Informed pt not to breastfeed while taking medication.     Plan:  Pt decided that she would prefer to try Cytotec instead of re-scheduling her D&C  R/B/A reviewed with the pt.

## 2024-04-17 ENCOUNTER — PROCEDURE VISIT (OUTPATIENT)
Dept: OBSTETRICS AND GYNECOLOGY | Facility: CLINIC | Age: 39
End: 2024-04-17
Payer: MEDICAID

## 2024-04-17 ENCOUNTER — OFFICE VISIT (OUTPATIENT)
Dept: OBSTETRICS AND GYNECOLOGY | Facility: CLINIC | Age: 39
End: 2024-04-17
Payer: MEDICAID

## 2024-04-17 VITALS
HEART RATE: 68 BPM | SYSTOLIC BLOOD PRESSURE: 122 MMHG | DIASTOLIC BLOOD PRESSURE: 77 MMHG | BODY MASS INDEX: 37.82 KG/M2 | WEIGHT: 263.63 LBS

## 2024-04-17 DIAGNOSIS — Z12.4 CERVICAL CANCER SCREENING: Primary | ICD-10-CM

## 2024-04-17 DIAGNOSIS — O03.4 INCOMPLETE ABORTION: Primary | ICD-10-CM

## 2024-04-17 DIAGNOSIS — N94.89 ADNEXAL MASS: ICD-10-CM

## 2024-04-17 DIAGNOSIS — Z01.419 ROUTINE GYNECOLOGICAL EXAMINATION: ICD-10-CM

## 2024-04-17 DIAGNOSIS — O03.4 INCOMPLETE ABORTION: ICD-10-CM

## 2024-04-17 PROCEDURE — 99499 UNLISTED E&M SERVICE: CPT | Mod: ,,, | Performed by: OBSTETRICS & GYNECOLOGY

## 2024-04-17 PROCEDURE — 88142 CYTOPATH C/V THIN LAYER: CPT | Mod: TC,GCY | Performed by: OBSTETRICS & GYNECOLOGY

## 2024-04-17 PROCEDURE — 76856 US EXAM PELVIC COMPLETE: CPT | Mod: ,,, | Performed by: OBSTETRICS & GYNECOLOGY

## 2024-04-17 PROCEDURE — 87624 HPV HI-RISK TYP POOLED RSLT: CPT | Mod: ,,, | Performed by: CLINICAL MEDICAL LABORATORY

## 2024-04-17 PROCEDURE — 99214 OFFICE O/P EST MOD 30 MIN: CPT | Mod: ,,, | Performed by: OBSTETRICS & GYNECOLOGY

## 2024-04-17 PROCEDURE — 3008F BODY MASS INDEX DOCD: CPT | Mod: ,,, | Performed by: OBSTETRICS & GYNECOLOGY

## 2024-04-17 PROCEDURE — 3078F DIAST BP <80 MM HG: CPT | Mod: ,,, | Performed by: OBSTETRICS & GYNECOLOGY

## 2024-04-17 PROCEDURE — 3074F SYST BP LT 130 MM HG: CPT | Mod: ,,, | Performed by: OBSTETRICS & GYNECOLOGY

## 2024-04-17 NOTE — H&P (VIEW-ONLY)
CC: Well woman exam    Sidra Winter is a 38 y.o. female  presents for well woman exam.    LMP: No LMP recorded (lmp unknown). (Menstrual status: Other)..    Last mammogram: N/A  Last Colonoscopy: N/A    Patient states she took the Cytotec that was prescribed on 2024 and she had some bleeding. She denies having passed any tissue or large blood clots.     Past Medical History:   Diagnosis Date    Anxiety disorder, unspecified     History of COVID-19     Scoliosis     chronic back pain     Past Surgical History:   Procedure Laterality Date     SECTION  2021     SECTION N/A 10/13/2022    Procedure:  SECTION;  Surgeon: Mario Foster MD;  Location: [facility] L&D;  Service: OB/GYN;  Laterality: N/A;    CLAVICLE SURGERY       Social History     Socioeconomic History    Marital status: Single   Tobacco Use    Smoking status: Former     Passive exposure: Never    Smokeless tobacco: Never   Substance and Sexual Activity    Alcohol use: Not Currently     Comment: Socially    Drug use: Not Currently    Sexual activity: Yes     Partners: Male     Birth control/protection: None     Family History   Problem Relation Name Age of Onset    Diabetes Paternal Grandfather      Breast cancer Maternal Grandfather      Diabetes Maternal Grandfather      Ankylosing spondylitis Mother      Sjogren's syndrome Mother      Osteoarthritis Mother      Irritable bowel syndrome Mother      Raynaud syndrome Mother      Gestational diabetes Brother      Breast cancer Maternal Aunt       OB History          3    Para   2    Term   2            AB        Living   2         SAB        IAB        Ectopic        Multiple   0    Live Births   2                 /77   Pulse 68   Wt 119.6 kg (263 lb 9.6 oz)   LMP  (LMP Unknown)   Breastfeeding Yes   BMI 37.82 kg/m²       ROS:  GENERAL: Denies weight gain or weight loss. Feeling well overall.   SKIN: Denies rash or lesions.   HEAD:  Denies head injury or headache.   NODES: Denies enlarged lymph nodes.   CHEST: Denies chest pain or shortness of breath.   CARDIOVASCULAR: Denies palpitations or left sided chest pain.   ABDOMEN: No abdominal pain, constipation, diarrhea, nausea, vomiting or rectal bleeding.   URINARY: No frequency, dysuria, hematuria, or burning on urination.  REPRODUCTIVE: See HPI.   BREASTS: The patient performs breast self-examination and denies pain, lumps, or nipple discharge.   HEMATOLOGIC: No easy bruisability or excessive bleeding.   MUSCULOSKELETAL: Denies joint pain or swelling.   NEUROLOGIC: Denies syncope or weakness.   PSYCHIATRIC: Denies depression, anxiety or mood swings.    PHYSICAL EXAM:  APPEARANCE: Well nourished, well developed, in no acute distress.  AFFECT: WNL, alert and oriented x 3  SKIN: No acne or hirsutism  NECK: Neck symmetric without masses or thyromegaly  NODES: No inguinal, cervical, axillary, or femoral lymph node enlargement  CHEST: Good respiratory effect  ABDOMEN: Soft.  No tenderness or masses.  No hepatosplenomegaly.  No hernias.  BREASTS: Symmetrical, no skin changes or visible lesions.  No palpable masses, nipple discharge bilaterally.  PELVIC: Normal external genitalia without lesions.  Normal hair distribution.  Adequate perineal body, normal urethral meatus.  Vagina moist and well rugated without lesions or discharge.  Cervix pink, without lesions, discharge or tenderness.  No significant cystocele or rectocele.  Bimanual exam shows uterus to be normal size, regular, mobile and nontender.  Adnexa without masses or tenderness.    EXTREMITIES: No edema.    Cervical cancer screening  -     ThinPrep Pap Test    Routine gynecological examination  -     ThinPrep Pap Test    Incomplete   -     US Pelvis Complete Non OB; Future; Expected date: 2024  -     US Pelvis Complete Non OB; Future; Expected date: 2024  -     Case Request Operating Room: DILATION AND CURETTAGE, UTERUS,  USING SUCTION, LAPAROSCOPY, DIAGNOSTIC    Adnexal mass  -     Case Request Operating Room: DILATION AND CURETTAGE, UTERUS, USING SUCTION, LAPAROSCOPY, DIAGNOSTIC        Pt scheduled for Suction D&C and diagnostic laparoscopy on 4/23/2024  Risks, benefits and alternatives to the procedure reviewed with the pt  After discussion of the risk, alternatives, benefits, and indication of surgery, as well as the risk of surgery, questions were sought and answered and informed consent obtained to proceed with surgery. We discussed the risk of the procedures to include, but not be limited to, 1) bleeding, which could be possibly excessive, potentially requiring a blood transfusion and subsequent risk of hepatitis (1 in 300,000), transfusion reaction (<1%), and HIV (1 in 1,000,000); 2) injury to internal organs including the bowel, bladder, great vessels, nerves, and ureters, potentially requiring further surgery at that time or at a later date; 3) infection requiring a prolonged hospital stay and antibiotics with possible drainage of an abscess or wound breakdown; 4) anesthetic complications; 5) deep venous thrombosis and the risk of pulmonary emboli; 6) pneumonia; and 7) infertility and/or menopause, 8) possible death. All questions were answered and a consent form was signed. She stated she understood the above risks and desired to proceed.   All questions answered to the level of the patient's satisfaction.   Case request and orders done.   Written materials provided  Consent obtained.      Patient was counseled today on A.C.S. Pap guidelines and recommendations for yearly pelvic exams, mammograms and monthly self breast exams; to see her PCP for other health maintenance.   Exercise regimen encouraged  Healthy food choices encouraged  Questions answered to desired level of satisfaction  Verbalized understanding to all information and instructions    Follow up in about 1 year (around 4/17/2025) for Annual.      Mario  AMANDA Foster., OG    OB/GYN

## 2024-04-17 NOTE — PROGRESS NOTES
CC: Well woman exam    Sidra Winter is a 38 y.o. female  presents for well woman exam.    LMP: No LMP recorded (lmp unknown). (Menstrual status: Other)..    Last mammogram: N/A  Last Colonoscopy: N/A    Patient states she took the Cytotec that was prescribed on 2024 and she had some bleeding. She denies having passed any tissue or large blood clots.     Past Medical History:   Diagnosis Date    Anxiety disorder, unspecified     History of COVID-19     Scoliosis     chronic back pain     Past Surgical History:   Procedure Laterality Date     SECTION  2021     SECTION N/A 10/13/2022    Procedure:  SECTION;  Surgeon: Mario Foster MD;  Location: [facility] L&D;  Service: OB/GYN;  Laterality: N/A;    CLAVICLE SURGERY       Social History     Socioeconomic History    Marital status: Single   Tobacco Use    Smoking status: Former     Passive exposure: Never    Smokeless tobacco: Never   Substance and Sexual Activity    Alcohol use: Not Currently     Comment: Socially    Drug use: Not Currently    Sexual activity: Yes     Partners: Male     Birth control/protection: None     Family History   Problem Relation Name Age of Onset    Diabetes Paternal Grandfather      Breast cancer Maternal Grandfather      Diabetes Maternal Grandfather      Ankylosing spondylitis Mother      Sjogren's syndrome Mother      Osteoarthritis Mother      Irritable bowel syndrome Mother      Raynaud syndrome Mother      Gestational diabetes Brother      Breast cancer Maternal Aunt       OB History          3    Para   2    Term   2            AB        Living   2         SAB        IAB        Ectopic        Multiple   0    Live Births   2                 /77   Pulse 68   Wt 119.6 kg (263 lb 9.6 oz)   LMP  (LMP Unknown)   Breastfeeding Yes   BMI 37.82 kg/m²       ROS:  GENERAL: Denies weight gain or weight loss. Feeling well overall.   SKIN: Denies rash or lesions.   HEAD:  Denies head injury or headache.   NODES: Denies enlarged lymph nodes.   CHEST: Denies chest pain or shortness of breath.   CARDIOVASCULAR: Denies palpitations or left sided chest pain.   ABDOMEN: No abdominal pain, constipation, diarrhea, nausea, vomiting or rectal bleeding.   URINARY: No frequency, dysuria, hematuria, or burning on urination.  REPRODUCTIVE: See HPI.   BREASTS: The patient performs breast self-examination and denies pain, lumps, or nipple discharge.   HEMATOLOGIC: No easy bruisability or excessive bleeding.   MUSCULOSKELETAL: Denies joint pain or swelling.   NEUROLOGIC: Denies syncope or weakness.   PSYCHIATRIC: Denies depression, anxiety or mood swings.    PHYSICAL EXAM:  APPEARANCE: Well nourished, well developed, in no acute distress.  AFFECT: WNL, alert and oriented x 3  SKIN: No acne or hirsutism  NECK: Neck symmetric without masses or thyromegaly  NODES: No inguinal, cervical, axillary, or femoral lymph node enlargement  CHEST: Good respiratory effect  ABDOMEN: Soft.  No tenderness or masses.  No hepatosplenomegaly.  No hernias.  BREASTS: Symmetrical, no skin changes or visible lesions.  No palpable masses, nipple discharge bilaterally.  PELVIC: Normal external genitalia without lesions.  Normal hair distribution.  Adequate perineal body, normal urethral meatus.  Vagina moist and well rugated without lesions or discharge.  Cervix pink, without lesions, discharge or tenderness.  No significant cystocele or rectocele.  Bimanual exam shows uterus to be normal size, regular, mobile and nontender.  Adnexa without masses or tenderness.    EXTREMITIES: No edema.    Cervical cancer screening  -     ThinPrep Pap Test    Routine gynecological examination  -     ThinPrep Pap Test    Incomplete   -     US Pelvis Complete Non OB; Future; Expected date: 2024  -     US Pelvis Complete Non OB; Future; Expected date: 2024  -     Case Request Operating Room: DILATION AND CURETTAGE, UTERUS,  USING SUCTION, LAPAROSCOPY, DIAGNOSTIC    Adnexal mass  -     Case Request Operating Room: DILATION AND CURETTAGE, UTERUS, USING SUCTION, LAPAROSCOPY, DIAGNOSTIC        Pt scheduled for Suction D&C and diagnostic laparoscopy on 4/23/2024  Risks, benefits and alternatives to the procedure reviewed with the pt  After discussion of the risk, alternatives, benefits, and indication of surgery, as well as the risk of surgery, questions were sought and answered and informed consent obtained to proceed with surgery. We discussed the risk of the procedures to include, but not be limited to, 1) bleeding, which could be possibly excessive, potentially requiring a blood transfusion and subsequent risk of hepatitis (1 in 300,000), transfusion reaction (<1%), and HIV (1 in 1,000,000); 2) injury to internal organs including the bowel, bladder, great vessels, nerves, and ureters, potentially requiring further surgery at that time or at a later date; 3) infection requiring a prolonged hospital stay and antibiotics with possible drainage of an abscess or wound breakdown; 4) anesthetic complications; 5) deep venous thrombosis and the risk of pulmonary emboli; 6) pneumonia; and 7) infertility and/or menopause, 8) possible death. All questions were answered and a consent form was signed. She stated she understood the above risks and desired to proceed.   All questions answered to the level of the patient's satisfaction.   Case request and orders done.   Written materials provided  Consent obtained.      Patient was counseled today on A.C.S. Pap guidelines and recommendations for yearly pelvic exams, mammograms and monthly self breast exams; to see her PCP for other health maintenance.   Exercise regimen encouraged  Healthy food choices encouraged  Questions answered to desired level of satisfaction  Verbalized understanding to all information and instructions    Follow up in about 1 year (around 4/17/2025) for Annual.      Mario  AMANDA Foster., OG    OB/GYN

## 2024-04-18 DIAGNOSIS — Z01.818 PREOP TESTING: Primary | ICD-10-CM

## 2024-04-23 ENCOUNTER — ANESTHESIA EVENT (OUTPATIENT)
Dept: SURGERY | Facility: HOSPITAL | Age: 39
End: 2024-04-23
Payer: MEDICAID

## 2024-04-23 ENCOUNTER — HOSPITAL ENCOUNTER (OUTPATIENT)
Facility: HOSPITAL | Age: 39
Discharge: HOME | End: 2024-04-23
Attending: OBSTETRICS & GYNECOLOGY | Admitting: OBSTETRICS & GYNECOLOGY
Payer: MEDICAID

## 2024-04-23 ENCOUNTER — ANESTHESIA (OUTPATIENT)
Dept: SURGERY | Facility: HOSPITAL | Age: 39
End: 2024-04-23
Payer: MEDICAID

## 2024-04-23 VITALS
HEIGHT: 70 IN | SYSTOLIC BLOOD PRESSURE: 109 MMHG | OXYGEN SATURATION: 96 % | TEMPERATURE: 98 F | DIASTOLIC BLOOD PRESSURE: 69 MMHG | WEIGHT: 263 LBS | HEART RATE: 68 BPM | BODY MASS INDEX: 37.65 KG/M2 | RESPIRATION RATE: 16 BRPM

## 2024-04-23 DIAGNOSIS — Z98.890 S/P D&C (STATUS POST DILATION AND CURETTAGE): Primary | ICD-10-CM

## 2024-04-23 DIAGNOSIS — O03.4 INCOMPLETE ABORTION: ICD-10-CM

## 2024-04-23 DIAGNOSIS — N94.89 ADNEXAL MASS: ICD-10-CM

## 2024-04-23 LAB
HCT VFR BLD AUTO: 34.6 % (ref 38–47)
HGB BLD-MCNC: 10.3 G/DL (ref 12–16)
HPV 16: NEGATIVE
HPV 18: NEGATIVE
HPV OTHER: NEGATIVE
INDIRECT COOMBS: NORMAL
RH BLD: NORMAL
SPECIMEN OUTDATE: NORMAL

## 2024-04-23 PROCEDURE — 63600175 PHARM REV CODE 636 W HCPCS: Performed by: NURSE ANESTHETIST, CERTIFIED REGISTERED

## 2024-04-23 PROCEDURE — 36415 COLL VENOUS BLD VENIPUNCTURE: CPT | Performed by: OBSTETRICS & GYNECOLOGY

## 2024-04-23 PROCEDURE — 36000708 HC OR TIME LEV III 1ST 15 MIN: Performed by: OBSTETRICS & GYNECOLOGY

## 2024-04-23 PROCEDURE — 85014 HEMATOCRIT: CPT | Performed by: OBSTETRICS & GYNECOLOGY

## 2024-04-23 PROCEDURE — D9220A PRA ANESTHESIA: Mod: CRNA,,, | Performed by: NURSE ANESTHETIST, CERTIFIED REGISTERED

## 2024-04-23 PROCEDURE — 88305 TISSUE EXAM BY PATHOLOGIST: CPT | Mod: TC,SUR | Performed by: OBSTETRICS & GYNECOLOGY

## 2024-04-23 PROCEDURE — 27201423 OPTIME MED/SURG SUP & DEVICES STERILE SUPPLY: Performed by: OBSTETRICS & GYNECOLOGY

## 2024-04-23 PROCEDURE — 25000003 PHARM REV CODE 250

## 2024-04-23 PROCEDURE — D9220A PRA ANESTHESIA: Mod: ANES,,, | Performed by: ANESTHESIOLOGY

## 2024-04-23 PROCEDURE — 88305 TISSUE EXAM BY PATHOLOGIST: CPT | Mod: 26,,, | Performed by: PATHOLOGY

## 2024-04-23 PROCEDURE — 49320 DIAG LAPARO SEPARATE PROC: CPT | Mod: XS,,, | Performed by: OBSTETRICS & GYNECOLOGY

## 2024-04-23 PROCEDURE — 86901 BLOOD TYPING SEROLOGIC RH(D): CPT | Performed by: OBSTETRICS & GYNECOLOGY

## 2024-04-23 PROCEDURE — 63600175 PHARM REV CODE 636 W HCPCS: Performed by: OBSTETRICS & GYNECOLOGY

## 2024-04-23 PROCEDURE — 71000016 HC POSTOP RECOV ADDL HR: Performed by: OBSTETRICS & GYNECOLOGY

## 2024-04-23 PROCEDURE — 71000033 HC RECOVERY, INTIAL HOUR: Performed by: OBSTETRICS & GYNECOLOGY

## 2024-04-23 PROCEDURE — 37000008 HC ANESTHESIA 1ST 15 MINUTES: Performed by: OBSTETRICS & GYNECOLOGY

## 2024-04-23 PROCEDURE — 71000039 HC RECOVERY, EACH ADD'L HOUR: Performed by: OBSTETRICS & GYNECOLOGY

## 2024-04-23 PROCEDURE — 63600175 PHARM REV CODE 636 W HCPCS: Performed by: ANESTHESIOLOGY

## 2024-04-23 PROCEDURE — 25000003 PHARM REV CODE 250: Performed by: NURSE ANESTHETIST, CERTIFIED REGISTERED

## 2024-04-23 PROCEDURE — 36000709 HC OR TIME LEV III EA ADD 15 MIN: Performed by: OBSTETRICS & GYNECOLOGY

## 2024-04-23 PROCEDURE — 71000015 HC POSTOP RECOV 1ST HR: Performed by: OBSTETRICS & GYNECOLOGY

## 2024-04-23 PROCEDURE — 37000009 HC ANESTHESIA EA ADD 15 MINS: Performed by: OBSTETRICS & GYNECOLOGY

## 2024-04-23 PROCEDURE — 59812 TREATMENT OF MISCARRIAGE: CPT | Mod: 51,,, | Performed by: OBSTETRICS & GYNECOLOGY

## 2024-04-23 RX ORDER — CEFAZOLIN SODIUM 1 G/3ML
INJECTION, POWDER, FOR SOLUTION INTRAMUSCULAR; INTRAVENOUS
Status: DISCONTINUED | OUTPATIENT
Start: 2024-04-23 | End: 2024-04-23

## 2024-04-23 RX ORDER — MORPHINE SULFATE 10 MG/ML
4 INJECTION INTRAMUSCULAR; INTRAVENOUS; SUBCUTANEOUS EVERY 5 MIN PRN
Status: DISCONTINUED | OUTPATIENT
Start: 2024-04-23 | End: 2024-04-23 | Stop reason: HOSPADM

## 2024-04-23 RX ORDER — LIDOCAINE HYDROCHLORIDE 20 MG/ML
INJECTION, SOLUTION EPIDURAL; INFILTRATION; INTRACAUDAL; PERINEURAL
Status: DISCONTINUED | OUTPATIENT
Start: 2024-04-23 | End: 2024-04-23

## 2024-04-23 RX ORDER — OXYTOCIN 10 [USP'U]/ML
INJECTION, SOLUTION INTRAMUSCULAR; INTRAVENOUS
Status: DISCONTINUED | OUTPATIENT
Start: 2024-04-23 | End: 2024-04-23

## 2024-04-23 RX ORDER — HYDROCODONE BITARTRATE AND ACETAMINOPHEN 5; 325 MG/1; MG/1
1 TABLET ORAL EVERY 4 HOURS PRN
Status: DISCONTINUED | OUTPATIENT
Start: 2024-04-23 | End: 2024-04-23 | Stop reason: HOSPADM

## 2024-04-23 RX ORDER — OXYTOCIN/RINGER'S LACTATE 30/500 ML
334 PLASTIC BAG, INJECTION (ML) INTRAVENOUS CONTINUOUS
Status: DISCONTINUED | OUTPATIENT
Start: 2024-04-23 | End: 2024-04-23 | Stop reason: HOSPADM

## 2024-04-23 RX ORDER — ROCURONIUM BROMIDE 10 MG/ML
INJECTION, SOLUTION INTRAVENOUS
Status: DISCONTINUED | OUTPATIENT
Start: 2024-04-23 | End: 2024-04-23

## 2024-04-23 RX ORDER — ONDANSETRON HYDROCHLORIDE 2 MG/ML
4 INJECTION, SOLUTION INTRAVENOUS DAILY PRN
Status: DISCONTINUED | OUTPATIENT
Start: 2024-04-23 | End: 2024-04-23 | Stop reason: HOSPADM

## 2024-04-23 RX ORDER — HYDROCODONE BITARTRATE AND ACETAMINOPHEN 7.5; 325 MG/1; MG/1
1 TABLET ORAL EVERY 6 HOURS PRN
Qty: 28 TABLET | Refills: 0 | Status: SHIPPED | OUTPATIENT
Start: 2024-04-23

## 2024-04-23 RX ORDER — FENTANYL CITRATE 50 UG/ML
INJECTION, SOLUTION INTRAMUSCULAR; INTRAVENOUS
Status: DISCONTINUED | OUTPATIENT
Start: 2024-04-23 | End: 2024-04-23

## 2024-04-23 RX ORDER — IPRATROPIUM BROMIDE AND ALBUTEROL SULFATE 2.5; .5 MG/3ML; MG/3ML
3 SOLUTION RESPIRATORY (INHALATION) ONCE AS NEEDED
Status: DISCONTINUED | OUTPATIENT
Start: 2024-04-23 | End: 2024-04-23 | Stop reason: HOSPADM

## 2024-04-23 RX ORDER — SODIUM CHLORIDE 9 MG/ML
INJECTION, SOLUTION INTRAVENOUS CONTINUOUS
Status: DISCONTINUED | OUTPATIENT
Start: 2024-04-23 | End: 2024-04-23 | Stop reason: HOSPADM

## 2024-04-23 RX ORDER — DIPHENHYDRAMINE HYDROCHLORIDE 50 MG/ML
25 INJECTION INTRAMUSCULAR; INTRAVENOUS EVERY 6 HOURS PRN
Status: DISCONTINUED | OUTPATIENT
Start: 2024-04-23 | End: 2024-04-23 | Stop reason: HOSPADM

## 2024-04-23 RX ORDER — SODIUM CHLORIDE, SODIUM LACTATE, POTASSIUM CHLORIDE, CALCIUM CHLORIDE 600; 310; 30; 20 MG/100ML; MG/100ML; MG/100ML; MG/100ML
INJECTION, SOLUTION INTRAVENOUS CONTINUOUS
Status: DISCONTINUED | OUTPATIENT
Start: 2024-04-23 | End: 2024-04-23 | Stop reason: HOSPADM

## 2024-04-23 RX ORDER — MISOPROSTOL 200 UG/1
TABLET ORAL
Status: DISCONTINUED
Start: 2024-04-23 | End: 2024-04-23 | Stop reason: HOSPADM

## 2024-04-23 RX ORDER — PROPOFOL 10 MG/ML
VIAL (ML) INTRAVENOUS
Status: DISCONTINUED | OUTPATIENT
Start: 2024-04-23 | End: 2024-04-23

## 2024-04-23 RX ORDER — DIPHENHYDRAMINE HCL 25 MG
25 CAPSULE ORAL EVERY 4 HOURS PRN
Status: DISCONTINUED | OUTPATIENT
Start: 2024-04-23 | End: 2024-04-23 | Stop reason: HOSPADM

## 2024-04-23 RX ORDER — HYDROMORPHONE HYDROCHLORIDE 2 MG/ML
0.5 INJECTION, SOLUTION INTRAMUSCULAR; INTRAVENOUS; SUBCUTANEOUS EVERY 5 MIN PRN
Status: DISCONTINUED | OUTPATIENT
Start: 2024-04-23 | End: 2024-04-23 | Stop reason: HOSPADM

## 2024-04-23 RX ORDER — MIDAZOLAM HYDROCHLORIDE 1 MG/ML
INJECTION INTRAMUSCULAR; INTRAVENOUS
Status: DISCONTINUED | OUTPATIENT
Start: 2024-04-23 | End: 2024-04-23

## 2024-04-23 RX ORDER — LIDOCAINE HYDROCHLORIDE 10 MG/ML
1 INJECTION INFILTRATION; PERINEURAL ONCE AS NEEDED
Status: DISCONTINUED | OUTPATIENT
Start: 2024-04-23 | End: 2024-04-23 | Stop reason: HOSPADM

## 2024-04-23 RX ORDER — MEPERIDINE HYDROCHLORIDE 25 MG/ML
25 INJECTION INTRAMUSCULAR; INTRAVENOUS; SUBCUTANEOUS ONCE AS NEEDED
Status: DISCONTINUED | OUTPATIENT
Start: 2024-04-23 | End: 2024-04-23 | Stop reason: HOSPADM

## 2024-04-23 RX ORDER — DIPHENHYDRAMINE HYDROCHLORIDE 50 MG/ML
25 INJECTION INTRAMUSCULAR; INTRAVENOUS EVERY 4 HOURS PRN
Status: DISCONTINUED | OUTPATIENT
Start: 2024-04-23 | End: 2024-04-23 | Stop reason: HOSPADM

## 2024-04-23 RX ORDER — ONDANSETRON 4 MG/1
8 TABLET, ORALLY DISINTEGRATING ORAL EVERY 8 HOURS PRN
Status: DISCONTINUED | OUTPATIENT
Start: 2024-04-23 | End: 2024-04-23 | Stop reason: HOSPADM

## 2024-04-23 RX ADMIN — LIDOCAINE HYDROCHLORIDE 80 MG: 20 INJECTION, SOLUTION INTRAVENOUS at 01:04

## 2024-04-23 RX ADMIN — OXYTOCIN 5 UNITS: 10 INJECTION, SOLUTION INTRAMUSCULAR; INTRAVENOUS at 01:04

## 2024-04-23 RX ADMIN — MISOPROSTOL 200 MCG: 200 TABLET ORAL at 02:04

## 2024-04-23 RX ADMIN — OXYTOCIN 5 UNITS: 10 INJECTION, SOLUTION INTRAMUSCULAR; INTRAVENOUS at 02:04

## 2024-04-23 RX ADMIN — HYDROMORPHONE HYDROCHLORIDE 0.5 MG: 2 INJECTION, SOLUTION INTRAMUSCULAR; INTRAVENOUS; SUBCUTANEOUS at 03:04

## 2024-04-23 RX ADMIN — FENTANYL CITRATE 100 MCG: 50 INJECTION INTRAMUSCULAR; INTRAVENOUS at 01:04

## 2024-04-23 RX ADMIN — MIDAZOLAM HYDROCHLORIDE 2 MG: 1 INJECTION, SOLUTION INTRAMUSCULAR; INTRAVENOUS at 01:04

## 2024-04-23 RX ADMIN — OXYTOCIN 10 UNITS: 10 INJECTION, SOLUTION INTRAMUSCULAR; INTRAVENOUS at 01:04

## 2024-04-23 RX ADMIN — PROPOFOL 150 MG: 10 INJECTION, EMULSION INTRAVENOUS at 01:04

## 2024-04-23 RX ADMIN — OXYTOCIN 334 MILLI-UNITS/MIN: 10 INJECTION, SOLUTION INTRAMUSCULAR; INTRAVENOUS at 04:04

## 2024-04-23 RX ADMIN — ROCURONIUM BROMIDE 40 MG: 10 INJECTION, SOLUTION INTRAVENOUS at 01:04

## 2024-04-23 RX ADMIN — SUGAMMADEX 200 MG: 100 INJECTION, SOLUTION INTRAVENOUS at 02:04

## 2024-04-23 RX ADMIN — CEFAZOLIN 2 G: 1 INJECTION, POWDER, FOR SOLUTION INTRAMUSCULAR; INTRAVENOUS; PARENTERAL at 01:04

## 2024-04-23 NOTE — ANESTHESIA PREPROCEDURE EVALUATION
2024  Sidra Winter is a 38 y.o., female.      Pre-op Assessment    I have reviewed the Patient Summary Reports.     I have reviewed the Nursing Notes. I have reviewed the NPO Status.   I have reviewed the Medications.     Review of Systems  Anesthesia Hx:  No problems with previous Anesthesia             Denies Family Hx of Anesthesia complications.    Denies Personal Hx of Anesthesia complications.                    Social:  Non-Smoker, No Alcohol Use       Cardiovascular:  Exercise tolerance: good                                           OB/GYN/PEDS:   Incomplete  [O03.4]       Adnexal mass [N94.89]              Endocrine:     Borderline hypothyroidism      Obesity / BMI > 30  Psych:  Psychiatric History anxiety                 Physical Exam  General: Well nourished, Cooperative and Alert    Airway:  Mallampati: II   Mouth Opening: Normal  TM Distance: Normal  Tongue: Normal  Neck ROM: Normal ROM    Dental:  Intact    Chest/Lungs:  Clear to auscultation, Normal Respiratory Rate    Heart:  Rate: Normal  Rhythm: Regular Rhythm        Chemistry        Component Value Date/Time     2024 0958    K 3.8 2024 0958     (H) 2024 0958    CO2 30 2024 0958    BUN 11 2024 0958    CREATININE 1.04 (H) 2024 0958    GLU 89 2024 0958        Component Value Date/Time    CALCIUM 9.5 2024 0958    ALKPHOS 259 (H) 2024 0958    AST 73 (H) 2024 0958    ALT 61 (H) 2024 0958    BILITOT 0.4 2024 0958    ESTGFRAFRICA 128 2021 1029    EGFRNONAA 106 2021 1029        Lab Results   Component Value Date    WBC 9.93 2024    HGB 11.9 (L) 2024    HCT 39.8 2024     2024     No results found for this or any previous visit.      Anesthesia Plan  Type of Anesthesia, risks & benefits  discussed:    Anesthesia Type: Gen ETT  Intra-op Monitoring Plan: Standard ASA Monitors  Post Op Pain Control Plan: multimodal analgesia  Induction:  IV  Airway Plan: Direct, Post-Induction  Informed Consent: Informed consent signed with the Patient and all parties understand the risks and agree with anesthesia plan.  All questions answered.   ASA Score: 2  Day of Surgery Review of History & Physical: H&P Update referred to the surgeon/provider.I have interviewed and examined the patient. I have reviewed the patient's H&P dated: There are no significant changes.     Ready For Surgery From Anesthesia Perspective.     .

## 2024-04-23 NOTE — OR NURSING
1441 Rec'd pt to PACU asleep with no distress noted, respirations even and unlabored. VSS. Abd lap site x1 C/D/I. Scant amount of blood noted to niya pad. No needs. Will continue to monitor.     1521 Pt c/o pain 7/10. IV dilaudid given, see MAR for admin.     1543 Out of PACU. VSS. No signs of bleeding/distress noted.     1545 Pt to ASC 3 drowsy but arousable to verbal stimuli. No signs of distress noted, respirations even and unlabored. Family at bedside. Bedside report given to SABAS Jiménez RN. Abd lap site x1 C/D/I, small amount of blood noted to niya pad. States pain improving, denies other needs. /71, P 64, R 16, O2 100% RA.

## 2024-04-23 NOTE — OP NOTE
OPERATIVE NOTE    DATE OF PROCEDURE: 04/23/2024    SURGEON: Mario Foster M.D.     PREOPERATIVE DIAGNOSIS: incomplete AB, pelvic mass    POSTOPERATIVE DIAGNOSIS: Retained POC.     PROCEDURE: Dilation and curettage, diagnostic laparoscopy.     ANESTHESIA: systemic    FINDINGS: Uterus sounded to 10 cm. Boggy uterus. No pelvic mass    ESTIMATED BLOOD LOSS: 300 mL.     URINE OUTPUT: 250 mL.     IV FLUIDS: 200 mL.     INDICATIONS: retained POC     PROCEDURE IN DETAIL: After proper consents were explained and obtained, the   patient was taken to the Operating Room where anesthesia was obtained without difficulty. She was then placed in dorsal lithotomy position in  [facility]. The patient was then prepped and draped in normal sterile fashion. A weighted speculum was placed into the vagina. Right angle used to visualize the cervix, which was grasped at the anterior lip with the single tooth tenaculum. The cervix was serially dilated to #18 Hegar dilator. The uterus had sounded to 10 cm.  An 8 mm curved suction curette was introduced to the uterus and the uterus cleared of products of conception.  A sharp curettage was performed until a gritty texture was noted.  The suction curette was then reintroduced into the uterus uterus cleared of all remaining clots and debris.  The patient encountered uterine atony with significant vaginal bleeding.  The patient was given IV Pitocin as well as rectal Cytotec that eventually resulted and cessation of the bleeding.  Attention was then turned to the abdomen where a 10 mm infraumbilical skin circumference scalpel.  This was carried down to the underlying layer of fascia which was entered with the Reyes scissors.  The peritoneum was entered digitally and the gel port introduced with ease.  A pneumoperitoneum was achieved with 3 L CO2 gas and patient placed in deep Trendelenburg position.  The pelvis was examined with no masses noted.  Both adnexa were found to be within normal  limits.  The uterus however was enlarged.  The pneumoperitoneum was evacuated and all instruments from the abdomen.  The fascial incision was closed with 0 Vicryl interrupted fashion.  Skin was closed with 4 0 Vicryl in a running subcuticular stitch.  The patient tolerated the procedure well. All counts were correct x2. The patient was taken to Recovery area in stable condition.

## 2024-04-23 NOTE — ANESTHESIA POSTPROCEDURE EVALUATION
Anesthesia Post Evaluation    Patient: Sidra Winter    Procedure(s) Performed: Procedure(s) (LRB):  DILATION AND CURETTAGE, UTERUS, USING SUCTION (N/A)  LAPAROSCOPY, DIAGNOSTIC (N/A)    Final Anesthesia Type: general      Patient location during evaluation: PACU  Patient participation: Yes- Able to Participate  Level of consciousness: awake and alert and oriented  Post-procedure vital signs: reviewed and stable  Pain management: adequate  Airway patency: patent  CAROLYN mitigation strategies: Multimodal analgesia  PONV status at discharge: No PONV  Anesthetic complications: no      Cardiovascular status: hemodynamically stable  Respiratory status: unassisted and spontaneous ventilation  Hydration status: euvolemic  Follow-up not needed.              Vitals Value Taken Time   /72 04/23/24 1447   Temp 36.4 °C (97.6 °F) 04/23/24 1445   Pulse 70 04/23/24 1449   Resp 14 04/23/24 1449   SpO2 97 % 04/23/24 1449   Vitals shown include unfiled device data.      No case tracking events are documented in the log.      Pain/Christy Score: Christy Score: 9 (4/23/2024  2:41 PM)

## 2024-04-23 NOTE — ANESTHESIA PROCEDURE NOTES
Intubation    Date/Time: 4/23/2024 1:21 PM    Performed by: Donn Macario CRNA  Authorized by: Cristhian Vitale DO    Intubation:     Induction:  Intravenous    Intubated:  Postinduction    Mask Ventilation:  Easy mask    Attempts:  1    Attempted By:  CRNA    Method of Intubation:  Direct    Blade:  Regla 4    Laryngeal View Grade: Grade I - full view of cords      Difficult Airway Encountered?: No      Complications:  None    Airway Device:  Oral endotracheal tube    Airway Device Size:  7.5    Style/Cuff Inflation:  Cuffed    Inflation Amount (mL):  8    Tube secured:  20    Secured at:  The lips    Placement Verified By:  Capnometry    Complicating Factors:  None    Findings Post-Intubation:  BS equal bilateral and atraumatic/condition of teeth unchanged

## 2024-04-23 NOTE — TRANSFER OF CARE
"Anesthesia Transfer of Care Note    Patient: Sidra Winter    Procedure(s) Performed: Procedure(s) (LRB):  DILATION AND CURETTAGE, UTERUS, USING SUCTION (N/A)  LAPAROSCOPY, DIAGNOSTIC (N/A)    Patient location: PACU    Anesthesia Type: general    Transport from OR: Transported from OR on room air with adequate spontaneous ventilation    Post pain: adequate analgesia    Post assessment: no apparent anesthetic complications and tolerated procedure well    Post vital signs: stable    Level of consciousness: responds to stimulation    Nausea/Vomiting: no nausea/vomiting    Complications: none    Transfer of care protocol was followedComments: Report Given to PACU rn VSS      Last vitals: Visit Vitals  /74 (BP Location: Right arm, Patient Position: Lying)   Pulse 78   Temp 36.4 °C (97.6 °F) (Oral)   Resp 12   Ht 5' 10" (1.778 m)   Wt 119.3 kg (263 lb)   LMP  (LMP Unknown)   SpO2 99%   Breastfeeding No   BMI 37.74 kg/m²     "

## 2024-04-24 LAB
ESTROGEN SERPL-MCNC: NORMAL PG/ML
INSULIN SERPL-ACNC: NORMAL U[IU]/ML
LAB AP GROSS DESCRIPTION: NORMAL
LAB AP LABORATORY NOTES: NORMAL
T3RU NFR SERPL: NORMAL %

## 2024-05-03 NOTE — PROCEDURES
Ultrasound note:    Uterus 8.55 x 4.97 x 5.13 cm   Endometrial thickness 1.31 cm     Right ovary 2.51 x 1.77 x 2.15 cm   Left ovary 42.92 x 2.14 x 1.91 cm     Impression:   Transvaginal study shows a complex echogenic areas seen within with multiple cystic areas within the complex area measures 5.0 x 4.3 cm with vascular flow the appears to be just at the anterior cervix consistent with retained products conception

## 2024-05-03 NOTE — PROCEDURES
Uterus 10.45 x 4.50 x 6.06 cm     Right ovary 2.8 x 1.78 x 2.5 cm     Left ovary 2.82.02 x 1.86 cm     Impression:  Endometrium has a heterogeneous texture with tissue and fluid seen   Retained products conception seen within the endometrium measuring 4.9 x 4.0 cm

## 2024-05-08 ENCOUNTER — OFFICE VISIT (OUTPATIENT)
Dept: OBSTETRICS AND GYNECOLOGY | Facility: CLINIC | Age: 39
End: 2024-05-08
Payer: MEDICAID

## 2024-05-08 VITALS
SYSTOLIC BLOOD PRESSURE: 129 MMHG | DIASTOLIC BLOOD PRESSURE: 82 MMHG | WEIGHT: 256.81 LBS | HEART RATE: 119 BPM | BODY MASS INDEX: 36.85 KG/M2

## 2024-05-08 DIAGNOSIS — Z30.09 FAMILY PLANNING: Primary | ICD-10-CM

## 2024-05-08 PROCEDURE — 99024 POSTOP FOLLOW-UP VISIT: CPT | Mod: ,,, | Performed by: OBSTETRICS & GYNECOLOGY

## 2024-05-08 PROCEDURE — 3008F BODY MASS INDEX DOCD: CPT | Mod: ,,, | Performed by: OBSTETRICS & GYNECOLOGY

## 2024-05-08 PROCEDURE — 3074F SYST BP LT 130 MM HG: CPT | Mod: ,,, | Performed by: OBSTETRICS & GYNECOLOGY

## 2024-05-08 NOTE — PROGRESS NOTES
Subjective     Patient ID: Sidra Winter is a 38 y.o. female.    Chief Complaint:  Post-op Evaluation      History of Present Illness  HPI  Postoperative Follow-up  Patient presents to the clinic 2 weeks status post  D&C  for  SAB . Eating a regular diet without difficulty. Bowel movements are normal. The patient is not having any pain.    GYN & OB History  Patient's last menstrual period was 2024 (approximate).   Date of Last Pap: No result found    OB History    Para Term  AB Living   3 2 2     2   SAB IAB Ectopic Multiple Live Births         0 2      # Outcome Date GA Lbr Matt/2nd Weight Sex Type Anes PTL Lv   3             2 Term 10/13/22 39w0d  3.886 kg (8 lb 9.1 oz) M CS-LTranv Spinal  RODY   1 Term 21 39w0d  3.969 kg (8 lb 12 oz) F CS-Unspec   RODY       Review of Systems  Review of Systems   Constitutional:  Negative for activity change, appetite change, fatigue and unexpected weight change.   HENT: Negative.     Respiratory:  Negative for cough, shortness of breath and wheezing.    Cardiovascular:  Negative for chest pain, palpitations and leg swelling.   Gastrointestinal:  Negative for abdominal pain, bloating, blood in stool, constipation, diarrhea, nausea, vomiting and reflux.   Genitourinary:  Negative for bladder incontinence, decreased libido, dysmenorrhea, dyspareunia, dysuria, flank pain, frequency, menorrhagia, menstrual problem, pelvic pain, urgency, vaginal bleeding, vaginal discharge, postcoital bleeding and vaginal odor.   Musculoskeletal:  Negative for back pain.   Integumentary:  Negative for rash, acne, hair changes, mole/lesion, breast mass, nipple discharge, breast skin changes and breast tenderness.   Neurological:  Negative for headaches.   Psychiatric/Behavioral:  Negative for depression and sleep disturbance. The patient is not nervous/anxious.    Breast: Negative for asymmetry, breast self exam, lump, mass, nipple discharge, skin changes and  tenderness         Objective   Physical Exam:   Constitutional: She is oriented to person, place, and time. She appears well-developed and well-nourished.    HENT:   Head: Normocephalic and atraumatic.    Eyes: Pupils are equal, round, and reactive to light. EOM are normal.     Cardiovascular:  Normal rate, regular rhythm and normal heart sounds.             Pulmonary/Chest: Effort normal and breath sounds normal.        Abdominal: Soft. Bowel sounds are normal.             Musculoskeletal: Normal range of motion and moves all extremeties.       Neurological: She is alert and oriented to person, place, and time. She has normal reflexes.     Psychiatric: She has a normal mood and affect. Her behavior is normal. Judgment and thought content normal.            Assessment and Plan     1. Family planning       Pt is still wanting to conceive but not at this time. Informed pt to wait at least 3 months before trying to conceive. Pt verbalized understanding.      Plan:  RTC for annual Gyn or prn

## 2024-05-21 ENCOUNTER — OFFICE VISIT (OUTPATIENT)
Dept: FAMILY MEDICINE | Facility: CLINIC | Age: 39
End: 2024-05-21
Payer: MEDICAID

## 2024-05-21 VITALS
OXYGEN SATURATION: 99 % | HEART RATE: 84 BPM | DIASTOLIC BLOOD PRESSURE: 88 MMHG | WEIGHT: 260.63 LBS | TEMPERATURE: 99 F | BODY MASS INDEX: 37.31 KG/M2 | HEIGHT: 70 IN | SYSTOLIC BLOOD PRESSURE: 128 MMHG

## 2024-05-21 DIAGNOSIS — L03.316 CELLULITIS OF UMBILICUS: Primary | ICD-10-CM

## 2024-05-21 PROCEDURE — 3008F BODY MASS INDEX DOCD: CPT | Mod: ,,, | Performed by: FAMILY MEDICINE

## 2024-05-21 PROCEDURE — 3074F SYST BP LT 130 MM HG: CPT | Mod: ,,, | Performed by: FAMILY MEDICINE

## 2024-05-21 PROCEDURE — 99213 OFFICE O/P EST LOW 20 MIN: CPT | Mod: ,,, | Performed by: FAMILY MEDICINE

## 2024-05-21 RX ORDER — MUPIROCIN 20 MG/G
OINTMENT TOPICAL 3 TIMES DAILY
Qty: 30 G | Refills: 0 | Status: SHIPPED | OUTPATIENT
Start: 2024-05-21 | End: 2024-05-28

## 2024-05-21 RX ORDER — CEPHALEXIN 500 MG/1
500 CAPSULE ORAL EVERY 8 HOURS
Qty: 21 CAPSULE | Refills: 0 | Status: SHIPPED | OUTPATIENT
Start: 2024-05-21 | End: 2024-05-28

## 2024-05-21 NOTE — PROGRESS NOTES
Braxton Ornelas MD   [facility]  [address] [place] [address]     PATIENT NAME: Sidra Winter  : 1985  DATE: 24  MRN: [id number]      Billing Provider: Braxton Ornelas MD  Level of Service: MO OFFICE/OUTPT VISIT, EST, LEVL III, 20-29 MIN  Patient PCP Information       Provider PCP Type    Braxton Ornelas MD General            Reason for Visit / Chief Complaint: Wound Check (Patient had diagnostic laparoscopy surgery . Drainage, redness, and pain to belly button above incision, patient concerned with infection. Patient cleaning with mineral oil. Patient states she started having pain and drainage last Thursday. )         History of Present Illness / Problem Focused Workflow     Sidra Winter presents to the clinic with Wound Check (Patient had diagnostic laparoscopy surgery . Drainage, redness, and pain to belly button above incision, patient concerned with infection. Patient cleaning with mineral oil. Patient states she started having pain and drainage last Thursday. )     HPI    Review of Systems     Review of Systems   Constitutional:  Negative for activity change, appetite change, fatigue and fever.   HENT:  Negative for nasal congestion, ear pain, hearing loss, sinus pressure/congestion and sore throat.    Respiratory:  Negative for cough, chest tightness and shortness of breath.    Cardiovascular:  Negative for chest pain and palpitations.   Gastrointestinal:  Positive for abdominal pain. Negative for fecal incontinence.   Genitourinary:  Negative for bladder incontinence and difficulty urinating.   Musculoskeletal:  Negative for arthralgias.   Integumentary:  Positive for wound. Negative for rash.   Neurological:  Negative for dizziness and headaches.        Medical / Social / Family History     Past Medical History:   Diagnosis Date    Anxiety disorder, unspecified     History of COVID-19     Scoliosis     chronic back pain        Past Surgical History:   Procedure Laterality Date     SECTION  2021     SECTION N/A 10/13/2022    Procedure:  SECTION;  Surgeon: Mario Foster MD;  Location: [facility] L&D;  Service: OB/GYN;  Laterality: N/A;    CLAVICLE SURGERY      DIAGNOSTIC LAPAROSCOPY N/A 2024    Procedure: LAPAROSCOPY, DIAGNOSTIC;  Surgeon: Mario Foster MD;  Location: [facility] OR;  Service: OB/GYN;  Laterality: N/A;    DILATION AND CURETTAGE OF UTERUS USING SUCTION N/A 2024    Procedure: DILATION AND CURETTAGE, UTERUS, USING SUCTION;  Surgeon: Mario Foster MD;  Location: [facility] OR;  Service: OB/GYN;  Laterality: N/A;       Social History  Sidra Winter  reports that she has quit smoking. She has never been exposed to tobacco smoke. She has never used smokeless tobacco. She reports that she does not currently use alcohol. She reports that she does not currently use drugs.    Family History  Sidra Winter  family history includes Ankylosing spondylitis in her mother; Breast cancer in her maternal aunt and maternal grandfather; Diabetes in her maternal grandfather and paternal grandfather; Gestational diabetes in her brother; Irritable bowel syndrome in her mother; Osteoarthritis in her mother; Raynaud syndrome in her mother; Sjogren's syndrome in her mother.    Medications and Allergies     Medications  Outpatient Medications Marked as Taking for the 24 encounter (Office Visit) with Braxton Ornelas MD   Medication Sig Dispense Refill    calcium carbonate (OS-JAVED) 600 mg calcium (1,500 mg) Tab Take 600 mg by mouth once daily.      meloxicam (MOBIC) 15 MG tablet Take 15 mg by mouth once daily.      omega-3 fatty acids/fish oil (FISH OIL-OMEGA-3 FATTY ACIDS) 300-1,000 mg capsule Take 2 capsules by mouth once daily.      prenatal no115/iron/folic acid (PRENATAL 19 ORAL) Take 1 tablet by mouth once daily.         Allergies  Review of patient's allergies  indicates:   Allergen Reactions    Adhesive Rash     Redness, pulls skin off       Physical Examination     Vitals:    05/21/24 0714   BP: (!) 130/98   Pulse: 84   Temp: 98.6 °F (37 °C)     Physical Exam  Constitutional:       General: She is not in acute distress.     Appearance: She is not ill-appearing.   HENT:      Head: Normocephalic and atraumatic.      Right Ear: Tympanic membrane and ear canal normal.      Left Ear: Tympanic membrane and ear canal normal.      Nose: Nose normal. No congestion or rhinorrhea.   Eyes:      Pupils: Pupils are equal, round, and reactive to light.   Cardiovascular:      Rate and Rhythm: Normal rate and regular rhythm.      Pulses: Normal pulses.      Heart sounds: No murmur heard.  Pulmonary:      Effort: No respiratory distress.      Breath sounds: No wheezing, rhonchi or rales.   Abdominal:      General: Bowel sounds are normal.      Palpations: Abdomen is soft.      Tenderness: There is no abdominal tenderness.      Hernia: No hernia is present.   Musculoskeletal:      Cervical back: Normal range of motion and neck supple.   Lymphadenopathy:      Cervical: No cervical adenopathy.   Skin:     General: Skin is warm and dry.      Findings: Lesion (lower interior umbilicus above recent incision.  erythema, purulent discharge) present.   Neurological:      Mental Status: She is alert.   Psychiatric:         Behavior: Behavior normal.         Thought Content: Thought content normal.          Assessment and Plan (including Health Maintenance)   :    Plan:         Health Maintenance Due   Topic Date Due    Lipid Panel  Never done    COVID-19 Vaccine (1 - 2023-24 season) Never done       Problem List Items Addressed This Visit    None  Visit Diagnoses       Cellulitis of umbilicus    -  Primary          Cellulitis of umbilicus    Other orders  -     cephALEXin (KEFLEX) 500 MG capsule; Take 1 capsule (500 mg total) by mouth every 8 (eight) hours. for 7 days  Dispense: 21 capsule; Refill:  0  -     mupirocin (BACTROBAN) 2 % ointment; Apply topically 3 (three) times daily. for 7 days  Dispense: 30 g; Refill: 0       Health Maintenance Topics with due status: Not Due       Topic Last Completion Date    Hemoglobin A1c (Diabetic Prevention Screening) 04/11/2022    TETANUS VACCINE 10/15/2022    Influenza Vaccine 10/15/2022    Cervical Cancer Screening 04/17/2024       Procedures     Future Appointments   Date Time Provider Department Center   4/23/2025  1:30 PM Mario Foster MD [facility]        No follow-ups on file.       Signature:  Braxton Ornelas MD  [facility]  [address] [place] [address]  [phone] Phone  [phone] Fax    Date of encounter: 5/21/24

## 2024-06-06 NOTE — PROCEDURES
GYN Ultrasound Note:    Uterus 9.52 x 5.26 x 5.14 cm      Right ovary 2.91 x 1.45 x 1.41 cm  Left ovary 2.96 x 1.65 x 1.93 cm    Impression:  There is a questionable areas seen posterior to the bladder this area has a dermoid appearance with vascular flow seen  Appears to be attached to the outer layer the uterus measures 6.25 x 5.0 cm

## 2024-08-06 ENCOUNTER — OFFICE VISIT (OUTPATIENT)
Dept: FAMILY MEDICINE | Facility: CLINIC | Age: 39
End: 2024-08-06
Payer: MEDICAID

## 2024-08-06 ENCOUNTER — HOSPITAL ENCOUNTER (EMERGENCY)
Facility: HOSPITAL | Age: 39
Discharge: ANOTHER HOSPITAL | End: 2024-08-07
Attending: EMERGENCY MEDICINE
Payer: MEDICAID

## 2024-08-06 VITALS
SYSTOLIC BLOOD PRESSURE: 116 MMHG | WEIGHT: 249 LBS | OXYGEN SATURATION: 98 % | TEMPERATURE: 98 F | HEART RATE: 116 BPM | HEIGHT: 70 IN | BODY MASS INDEX: 35.65 KG/M2 | DIASTOLIC BLOOD PRESSURE: 82 MMHG

## 2024-08-06 DIAGNOSIS — R06.02 SOB (SHORTNESS OF BREATH): ICD-10-CM

## 2024-08-06 DIAGNOSIS — R10.9 ABDOMINAL PAIN: ICD-10-CM

## 2024-08-06 DIAGNOSIS — R06.02 SHORTNESS OF BREATH: ICD-10-CM

## 2024-08-06 DIAGNOSIS — M79.89 LEG SWELLING: ICD-10-CM

## 2024-08-06 DIAGNOSIS — R00.0 TACHYCARDIA: ICD-10-CM

## 2024-08-06 DIAGNOSIS — K59.00 CONSTIPATION, UNSPECIFIED CONSTIPATION TYPE: ICD-10-CM

## 2024-08-06 DIAGNOSIS — R23.1 PALLOR: ICD-10-CM

## 2024-08-06 DIAGNOSIS — S21.101A OPEN WOUND OF RIGHT CHEST WALL, INITIAL ENCOUNTER: Primary | ICD-10-CM

## 2024-08-06 DIAGNOSIS — T14.8XXA CHRONIC WOUND: ICD-10-CM

## 2024-08-06 DIAGNOSIS — Z34.90 PREGNANCY, UNSPECIFIED GESTATIONAL AGE: ICD-10-CM

## 2024-08-06 DIAGNOSIS — K80.42 CHOLEDOCHOLITHIASIS WITH ACUTE CHOLECYSTITIS: Primary | ICD-10-CM

## 2024-08-06 LAB
ALBUMIN SERPL BCP-MCNC: 2.1 G/DL (ref 3.5–5)
ALBUMIN/GLOB SERPL: 0.4 {RATIO}
ALP SERPL-CCNC: 1339 U/L (ref 37–98)
ALT SERPL W P-5'-P-CCNC: 116 U/L (ref 13–56)
ANION GAP SERPL CALCULATED.3IONS-SCNC: 16 MMOL/L (ref 7–16)
ANISOCYTOSIS BLD QL SMEAR: ABNORMAL
APTT PPP: 40.2 SECONDS (ref 25.2–37.3)
AST SERPL W P-5'-P-CCNC: 447 U/L (ref 15–37)
B-HCG UR QL: POSITIVE
BACTERIA #/AREA URNS HPF: ABNORMAL /HPF
BASOPHILS # BLD AUTO: 0.1 K/UL (ref 0–0.2)
BASOPHILS NFR BLD AUTO: 0.5 % (ref 0–1)
BILIRUB SERPL-MCNC: 5 MG/DL (ref ?–1.2)
BILIRUB UR QL STRIP: 2
BUN SERPL-MCNC: 21 MG/DL (ref 7–18)
BUN/CREAT SERPL: 16 (ref 6–20)
CALCIUM SERPL-MCNC: 9.1 MG/DL (ref 8.5–10.1)
CAOX CRY UR QL COMP ASSIST: ABNORMAL
CHLORIDE SERPL-SCNC: 94 MMOL/L (ref 98–107)
CLARITY UR: ABNORMAL
CO2 SERPL-SCNC: 20 MMOL/L (ref 21–32)
COLOR UR: ABNORMAL
CREAT SERPL-MCNC: 1.28 MG/DL (ref 0.55–1.02)
CTP QC/QA: YES
D DIMER PPP FEU-MCNC: 9.55 ΜG/ML (ref 0–0.47)
DIFFERENTIAL METHOD BLD: ABNORMAL
EGFR (NO RACE VARIABLE) (RUSH/TITUS): 56 ML/MIN/1.73M2
EOSINOPHIL # BLD AUTO: 0.03 K/UL (ref 0–0.5)
EOSINOPHIL NFR BLD AUTO: 0.2 % (ref 1–4)
ERYTHROCYTE [DISTWIDTH] IN BLOOD BY AUTOMATED COUNT: 22.9 % (ref 11.5–14.5)
FERRITIN SERPL-MCNC: 516 NG/ML (ref 8–252)
GLOBULIN SER-MCNC: 4.8 G/DL (ref 2–4)
GLUCOSE SERPL-MCNC: 87 MG/DL (ref 74–106)
GLUCOSE UR STRIP-MCNC: NORMAL MG/DL
HCG SERPL-ACNC: 8 MIU/ML
HCT VFR BLD AUTO: 42.2 % (ref 38–47)
HGB BLD-MCNC: 12.8 G/DL (ref 12–16)
HYALINE CASTS #/AREA URNS LPF: ABNORMAL /LPF
HYPOCHROMIA BLD QL SMEAR: ABNORMAL
IMM GRANULOCYTES # BLD AUTO: 0.26 K/UL (ref 0–0.04)
IMM GRANULOCYTES NFR BLD: 1.4 % (ref 0–0.4)
INDIRECT COOMBS: NORMAL
INFLUENZA A MOLECULAR (OHS): NEGATIVE
INFLUENZA B MOLECULAR (OHS): NEGATIVE
INR BLD: 1.34
IRON SATN MFR SERPL: 14 % (ref 14–50)
IRON SERPL-MCNC: 35 ΜG/DL (ref 50–170)
KETONES UR STRIP-SCNC: ABNORMAL MG/DL
LEUKOCYTE ESTERASE UR QL STRIP: ABNORMAL
LYMPHOCYTES # BLD AUTO: 4.02 K/UL (ref 1–4.8)
LYMPHOCYTES NFR BLD AUTO: 21.9 % (ref 27–41)
MAGNESIUM SERPL-MCNC: 2.7 MG/DL (ref 1.7–2.3)
MCH RBC QN AUTO: 23.8 PG (ref 27–31)
MCHC RBC AUTO-ENTMCNC: 30.3 G/DL (ref 32–36)
MCV RBC AUTO: 78.6 FL (ref 80–96)
MICROCYTES BLD QL SMEAR: ABNORMAL
MONOCYTES # BLD AUTO: 1.67 K/UL (ref 0–0.8)
MONOCYTES NFR BLD AUTO: 9.1 % (ref 2–6)
MPC BLD CALC-MCNC: 9.9 FL (ref 9.4–12.4)
MUCOUS, UA: ABNORMAL /LPF
NEUTROPHILS # BLD AUTO: 12.28 K/UL (ref 1.8–7.7)
NEUTROPHILS NFR BLD AUTO: 66.9 % (ref 53–65)
NITRITE UR QL STRIP: NEGATIVE
NRBC # BLD AUTO: 0.04 X10E3/UL
NRBC, AUTO (.00): 0.2 %
NT-PROBNP SERPL-MCNC: 325 PG/ML (ref 1–125)
PH UR STRIP: 5 PH UNITS
PLATELET # BLD AUTO: 206 K/UL (ref 150–400)
PLATELET MORPHOLOGY: ABNORMAL
POLYCHROMASIA BLD QL SMEAR: ABNORMAL
POTASSIUM SERPL-SCNC: 4.4 MMOL/L (ref 3.5–5.1)
PROT SERPL-MCNC: 6.9 G/DL (ref 6.4–8.2)
PROT UR QL STRIP: 30
PROTHROMBIN TIME: 16.4 SECONDS (ref 11.7–14.7)
RBC # BLD AUTO: 5.37 M/UL (ref 4.2–5.4)
RBC # UR STRIP: ABNORMAL /UL
RBC #/AREA URNS HPF: 3 /HPF
RH BLD: NORMAL
SARS-COV-2 RDRP RESP QL NAA+PROBE: NEGATIVE
SODIUM SERPL-SCNC: 126 MMOL/L (ref 136–145)
SP GR UR STRIP: 1.02
SPECIMEN OUTDATE: NORMAL
SQUAMOUS #/AREA URNS LPF: ABNORMAL /HPF
TIBC SERPL-MCNC: 244 ΜG/DL (ref 250–450)
TROPONIN I SERPL DL<=0.01 NG/ML-MCNC: 9.7 PG/ML
UROBILINOGEN UR STRIP-ACNC: 8 MG/DL
WBC # BLD AUTO: 18.36 K/UL (ref 4.5–11)
WBC #/AREA URNS HPF: 3 /HPF

## 2024-08-06 PROCEDURE — 81001 URINALYSIS AUTO W/SCOPE: CPT | Performed by: EMERGENCY MEDICINE

## 2024-08-06 PROCEDURE — 85730 THROMBOPLASTIN TIME PARTIAL: CPT | Performed by: EMERGENCY MEDICINE

## 2024-08-06 PROCEDURE — 63600175 PHARM REV CODE 636 W HCPCS: Performed by: EMERGENCY MEDICINE

## 2024-08-06 PROCEDURE — 85379 FIBRIN DEGRADATION QUANT: CPT | Performed by: EMERGENCY MEDICINE

## 2024-08-06 PROCEDURE — 85025 COMPLETE CBC W/AUTO DIFF WBC: CPT

## 2024-08-06 PROCEDURE — 96365 THER/PROPH/DIAG IV INF INIT: CPT

## 2024-08-06 PROCEDURE — 84484 ASSAY OF TROPONIN QUANT: CPT

## 2024-08-06 PROCEDURE — 87149 DNA/RNA DIRECT PROBE: CPT | Performed by: EMERGENCY MEDICINE

## 2024-08-06 PROCEDURE — 93010 ELECTROCARDIOGRAM REPORT: CPT | Mod: ,,, | Performed by: STUDENT IN AN ORGANIZED HEALTH CARE EDUCATION/TRAINING PROGRAM

## 2024-08-06 PROCEDURE — 82728 ASSAY OF FERRITIN: CPT | Performed by: EMERGENCY MEDICINE

## 2024-08-06 PROCEDURE — 87077 CULTURE AEROBIC IDENTIFY: CPT | Performed by: EMERGENCY MEDICINE

## 2024-08-06 PROCEDURE — 36415 COLL VENOUS BLD VENIPUNCTURE: CPT | Performed by: EMERGENCY MEDICINE

## 2024-08-06 PROCEDURE — 86901 BLOOD TYPING SEROLOGIC RH(D): CPT | Performed by: EMERGENCY MEDICINE

## 2024-08-06 PROCEDURE — 25000003 PHARM REV CODE 250: Performed by: EMERGENCY MEDICINE

## 2024-08-06 PROCEDURE — 83880 ASSAY OF NATRIURETIC PEPTIDE: CPT

## 2024-08-06 PROCEDURE — 93005 ELECTROCARDIOGRAM TRACING: CPT

## 2024-08-06 PROCEDURE — 96375 TX/PRO/DX INJ NEW DRUG ADDON: CPT

## 2024-08-06 PROCEDURE — 83540 ASSAY OF IRON: CPT | Performed by: EMERGENCY MEDICINE

## 2024-08-06 PROCEDURE — 85610 PROTHROMBIN TIME: CPT | Performed by: EMERGENCY MEDICINE

## 2024-08-06 PROCEDURE — 87502 INFLUENZA DNA AMP PROBE: CPT | Performed by: EMERGENCY MEDICINE

## 2024-08-06 PROCEDURE — 99213 OFFICE O/P EST LOW 20 MIN: CPT | Mod: ,,, | Performed by: FAMILY MEDICINE

## 2024-08-06 PROCEDURE — 86850 RBC ANTIBODY SCREEN: CPT | Performed by: EMERGENCY MEDICINE

## 2024-08-06 PROCEDURE — 99285 EMERGENCY DEPT VISIT HI MDM: CPT | Mod: 25

## 2024-08-06 PROCEDURE — 87635 SARS-COV-2 COVID-19 AMP PRB: CPT | Performed by: EMERGENCY MEDICINE

## 2024-08-06 PROCEDURE — 86900 BLOOD TYPING SEROLOGIC ABO: CPT | Performed by: EMERGENCY MEDICINE

## 2024-08-06 PROCEDURE — 3008F BODY MASS INDEX DOCD: CPT | Mod: ,,, | Performed by: FAMILY MEDICINE

## 2024-08-06 PROCEDURE — 87040 BLOOD CULTURE FOR BACTERIA: CPT | Performed by: EMERGENCY MEDICINE

## 2024-08-06 PROCEDURE — 81003 URINALYSIS AUTO W/O SCOPE: CPT | Performed by: EMERGENCY MEDICINE

## 2024-08-06 PROCEDURE — 80053 COMPREHEN METABOLIC PANEL: CPT

## 2024-08-06 PROCEDURE — 3074F SYST BP LT 130 MM HG: CPT | Mod: ,,, | Performed by: FAMILY MEDICINE

## 2024-08-06 PROCEDURE — 84702 CHORIONIC GONADOTROPIN TEST: CPT | Performed by: EMERGENCY MEDICINE

## 2024-08-06 PROCEDURE — 81025 URINE PREGNANCY TEST: CPT | Performed by: EMERGENCY MEDICINE

## 2024-08-06 PROCEDURE — 83735 ASSAY OF MAGNESIUM: CPT | Performed by: EMERGENCY MEDICINE

## 2024-08-06 RX ORDER — HYDROMORPHONE HYDROCHLORIDE 2 MG/ML
0.5 INJECTION, SOLUTION INTRAMUSCULAR; INTRAVENOUS; SUBCUTANEOUS
Status: COMPLETED | OUTPATIENT
Start: 2024-08-06 | End: 2024-08-06

## 2024-08-06 RX ADMIN — PIPERACILLIN AND TAZOBACTAM 4.5 G: 4; .5 INJECTION, POWDER, LYOPHILIZED, FOR SOLUTION INTRAVENOUS; PARENTERAL at 10:08

## 2024-08-06 RX ADMIN — HYDROMORPHONE HYDROCHLORIDE 0.5 MG: 2 INJECTION, SOLUTION INTRAMUSCULAR; INTRAVENOUS; SUBCUTANEOUS at 11:08

## 2024-08-07 VITALS
DIASTOLIC BLOOD PRESSURE: 88 MMHG | HEIGHT: 70 IN | WEIGHT: 240 LBS | RESPIRATION RATE: 18 BRPM | TEMPERATURE: 98 F | OXYGEN SATURATION: 95 % | SYSTOLIC BLOOD PRESSURE: 145 MMHG | BODY MASS INDEX: 34.36 KG/M2 | HEART RATE: 97 BPM

## 2024-08-07 LAB
LACTATE SERPL-SCNC: 2.4 MMOL/L (ref 0.4–2)
LACTATE SERPL-SCNC: 3 MMOL/L (ref 0.4–2)
LACTATE SERPL-SCNC: 3.7 MMOL/L (ref 0.4–2)
LACTATE SERPL-SCNC: 4.1 MMOL/L (ref 0.4–2)

## 2024-08-07 PROCEDURE — 96361 HYDRATE IV INFUSION ADD-ON: CPT

## 2024-08-07 PROCEDURE — 99900035 HC TECH TIME PER 15 MIN (STAT)

## 2024-08-07 PROCEDURE — 96375 TX/PRO/DX INJ NEW DRUG ADDON: CPT

## 2024-08-07 PROCEDURE — 83605 ASSAY OF LACTIC ACID: CPT | Performed by: EMERGENCY MEDICINE

## 2024-08-07 PROCEDURE — 25000003 PHARM REV CODE 250: Performed by: EMERGENCY MEDICINE

## 2024-08-07 PROCEDURE — 96366 THER/PROPH/DIAG IV INF ADDON: CPT

## 2024-08-07 PROCEDURE — 63600175 PHARM REV CODE 636 W HCPCS: Performed by: EMERGENCY MEDICINE

## 2024-08-07 PROCEDURE — 36415 COLL VENOUS BLD VENIPUNCTURE: CPT | Performed by: EMERGENCY MEDICINE

## 2024-08-07 PROCEDURE — 96376 TX/PRO/DX INJ SAME DRUG ADON: CPT

## 2024-08-07 PROCEDURE — 96367 TX/PROPH/DG ADDL SEQ IV INF: CPT

## 2024-08-07 PROCEDURE — 27000221 HC OXYGEN, UP TO 24 HOURS

## 2024-08-07 RX ORDER — SODIUM CHLORIDE 9 MG/ML
1000 INJECTION, SOLUTION INTRAVENOUS
Status: COMPLETED | OUTPATIENT
Start: 2024-08-07 | End: 2024-08-07

## 2024-08-07 RX ORDER — ONDANSETRON HYDROCHLORIDE 2 MG/ML
4 INJECTION, SOLUTION INTRAVENOUS
Status: COMPLETED | OUTPATIENT
Start: 2024-08-07 | End: 2024-08-07

## 2024-08-07 RX ORDER — HYDROMORPHONE HYDROCHLORIDE 2 MG/ML
1 INJECTION, SOLUTION INTRAMUSCULAR; INTRAVENOUS; SUBCUTANEOUS
Status: COMPLETED | OUTPATIENT
Start: 2024-08-07 | End: 2024-08-07

## 2024-08-07 RX ADMIN — SODIUM CHLORIDE 1000 ML: 9 INJECTION, SOLUTION INTRAVENOUS at 11:08

## 2024-08-07 RX ADMIN — SODIUM CHLORIDE 1000 ML: 9 INJECTION, SOLUTION INTRAVENOUS at 05:08

## 2024-08-07 RX ADMIN — PIPERACILLIN AND TAZOBACTAM 4.5 G: 4; .5 INJECTION, POWDER, LYOPHILIZED, FOR SOLUTION INTRAVENOUS; PARENTERAL at 05:08

## 2024-08-07 RX ADMIN — SODIUM CHLORIDE 2000 ML: 9 INJECTION, SOLUTION INTRAVENOUS at 02:08

## 2024-08-07 RX ADMIN — HYDROMORPHONE HYDROCHLORIDE 1 MG: 2 INJECTION, SOLUTION INTRAMUSCULAR; INTRAVENOUS; SUBCUTANEOUS at 04:08

## 2024-08-07 RX ADMIN — DEXTROSE MONOHYDRATE 1750 MG: 50 INJECTION, SOLUTION INTRAVENOUS at 01:08

## 2024-08-07 RX ADMIN — ONDANSETRON 4 MG: 2 INJECTION INTRAMUSCULAR; INTRAVENOUS at 04:08

## 2024-08-07 NOTE — ED PROVIDER NOTES
Encounter Date: 2024       History     Chief Complaint   Patient presents with    Abscess    Abdominal Pain     Pt presents to the ed w/ c/o abscess to the right chest, and abd pain      38-year-old female presents to the emergency department for evaluation of shortness of breath, dizziness, constipation, wound to right upper chest.  Reports that shortness of breath is new onset and occurs on exertion.  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  States that now the wound has spread and opened with yellow drainage. Denies chest pain, headache, nausea/vomiting, neck pain/stiffness, fever, chills, abdominal pain, back pain, dysuria.    The history is provided by the patient. No  was used.   Shortness of Breath  This is a new problem. The problem occurs frequently.The current episode started more than 1 week ago. The problem has not changed since onset.Pertinent negatives include no fever, no headaches, no rhinorrhea, no neck pain, no cough, no wheezing, no chest pain, no vomiting, no abdominal pain and no leg swelling. She has tried nothing for the symptoms.     Review of patient's allergies indicates:   Allergen Reactions    Adhesive Rash     Redness, pulls skin off     Past Medical History:   Diagnosis Date    Anxiety disorder, unspecified     History of COVID-19     Scoliosis     chronic back pain     Past Surgical History:   Procedure Laterality Date     SECTION  2021     SECTION N/A 10/13/2022    Procedure:  SECTION;  Surgeon: Mario Foster MD;  Location: [facility] L&D;  Service: OB/GYN;  Laterality: N/A;    CLAVICLE SURGERY      DIAGNOSTIC LAPAROSCOPY N/A 2024    Procedure: LAPAROSCOPY, DIAGNOSTIC;  Surgeon: Mario Foster MD;  Location: [facility] OR;  Service: OB/GYN;  Laterality: N/A;    DILATION AND CURETTAGE OF UTERUS USING  SUCTION N/A 4/23/2024    Procedure: DILATION AND CURETTAGE, UTERUS, USING SUCTION;  Surgeon: Mario Foster MD;  Location: [facility];  Service: OB/GYN;  Laterality: N/A;     Family History   Problem Relation Name Age of Onset    Diabetes Paternal Grandfather      Breast cancer Maternal Grandfather      Diabetes Maternal Grandfather      Ankylosing spondylitis Mother      Sjogren's syndrome Mother      Osteoarthritis Mother      Irritable bowel syndrome Mother      Raynaud syndrome Mother      Gestational diabetes Brother      Breast cancer Maternal Aunt       Social History     Tobacco Use    Smoking status: Former     Passive exposure: Never    Smokeless tobacco: Never   Substance Use Topics    Alcohol use: Not Currently     Comment: Socially    Drug use: Not Currently     Review of Systems   Constitutional:  Negative for chills and fever.   HENT:  Negative for rhinorrhea.    Eyes:  Negative for photophobia, pain and visual disturbance.   Respiratory:  Positive for shortness of breath. Negative for cough, chest tightness, wheezing and stridor.    Cardiovascular:  Negative for chest pain, palpitations and leg swelling.   Gastrointestinal:  Negative for abdominal pain and vomiting.   Genitourinary:  Negative for decreased urine volume and dysuria.   Musculoskeletal:  Negative for neck pain and neck stiffness.   Neurological:  Positive for dizziness. Negative for tremors, weakness, light-headedness and headaches.   Psychiatric/Behavioral:  Negative for confusion.    All other systems reviewed and are negative.      Physical Exam     Initial Vitals   BP Pulse Resp Temp SpO2   08/06/24 1750 08/06/24 1750 08/06/24 1747 08/06/24 1750 08/06/24 1750   (!) 134/93 110 (!) 22 97.6 °F (36.4 °C) 98 %      MAP       --                Physical Exam    Vitals reviewed.  Constitutional: She appears well-nourished. No distress.   HENT:   Head: Normocephalic and atraumatic.   Eyes: Pupils are equal, round, and reactive to  light. Right eye exhibits no discharge. Left eye exhibits no discharge.   Neck: Neck supple.   Normal range of motion.  Pulmonary/Chest: Breath sounds normal. No respiratory distress. She has no wheezes. She has no rhonchi.   Abdominal: Abdomen is soft. Bowel sounds are normal. She exhibits distension. There is no abdominal tenderness. There is no guarding.   Musculoskeletal:         General: Normal range of motion.      Cervical back: Normal range of motion and neck supple.     Neurological: She is alert and oriented to person, place, and time. GCS score is 15. GCS eye subscore is 4. GCS verbal subscore is 5. GCS motor subscore is 6.   Skin: Capillary refill takes less than 2 seconds.   Large, circular 5cm in diameter ulceration noted to right upper chest wall with purulent drainage noted. Erythema noted around ulceration with no bleeding present   Psychiatric: She has a normal mood and affect. Her behavior is normal.         Medical Screening Exam   See Full Note    ED Course   Procedures  Labs Reviewed   CULTURE, BLOOD - Abnormal       Result Value    Gram Stain Result Gram positive cocci (*)    GRAM-POSITIVE BLOOD CULTURE TEST - Abnormal    Verigene Result Methicillin Resistant Staphylococcus epidermidis (*)    COMPREHENSIVE METABOLIC PANEL - Abnormal    Sodium 126 (*)     Potassium 4.4      Chloride 94 (*)     CO2 20 (*)     Anion Gap 16      Glucose 87      BUN 21 (*)     Creatinine 1.28 (*)     BUN/Creatinine Ratio 16      Calcium 9.1      Total Protein 6.9      Albumin 2.1 (*)     Globulin 4.8 (*)     A/G Ratio 0.4      Bilirubin, Total 5.0 (*)     Alk Phos 1,339 (*)      (*)      (*)     eGFR 56 (*)    NT-PRO NATRIURETIC PEPTIDE - Abnormal    ProBNP 325 (*)    CBC WITH DIFFERENTIAL - Abnormal    WBC 18.36 (*)     RBC 5.37      Hemoglobin 12.8      Hematocrit 42.2      MCV 78.6 (*)     MCH 23.8 (*)     MCHC 30.3 (*)     RDW 22.9 (*)     Platelet Count 206      MPV 9.9      Neutrophils % 66.9  (*)     Lymphocytes % 21.9 (*)     Monocytes % 9.1 (*)     Eosinophils % 0.2 (*)     Basophils % 0.5      Immature Granulocytes % 1.4 (*)     nRBC, Auto 0.2 (*)     Neutrophils, Abs 12.28 (*)     Lymphocytes, Absolute 4.02      Monocytes, Absolute 1.67 (*)     Eosinophils, Absolute 0.03      Basophils, Absolute 0.10      Immature Granulocytes, Absolute 0.26 (*)     nRBC, Absolute 0.04 (*)     Diff Type Scan Smear     APTT - Abnormal    PTT 40.2 (*)    PROTIME-INR - Abnormal    PT 16.4 (*)     INR 1.34     IRON AND TIBC - Abnormal    Iron 35 (*)     Iron Saturation 14      TIBC 244 (*)    FERRITIN - Abnormal    Ferritin 516 (*)    D DIMER, QUANTITATIVE - Abnormal    D-Dimer 9.55 (*)    MAGNESIUM - Abnormal    Magnesium 2.7 (*)    URINALYSIS, REFLEX TO URINE CULTURE - Abnormal    Color, UA Dark-Yellow      Clarity, UA Turbid      pH, UA 5.0      Leukocytes, UA Trace (*)     Nitrites, UA Negative      Protein, UA 30 (*)     Glucose, UA Normal      Ketones, UA Trace      Urobilinogen, UA 8 (*)     Bilirubin, UA 2 (*)     Blood, UA Moderate (*)     Specific [place], UA 1.025     URINALYSIS, MICROSCOPIC - Abnormal    WBC, UA 3      RBC, UA 3      Bacteria, UA Occasional (*)     Squamous Epithelial Cells, UA Occasional (*)     Hyaline Casts, UA Too Numerous To Count (*)     Calcium Oxalate Crystals, UA Occasional (*)     Mucous Many (*)    CBC MORPHOLOGY - Abnormal    Platelet Morphology Platelet Clumping (*)     Anisocytosis 2+      Microcytosis Few      Polychromasia Few      Hypochromic 1+     LACTIC ACID, PLASMA - Abnormal    Lactic Acid 4.1 (*)    LACTIC ACID, PLASMA - Abnormal    Lactic Acid 3.7 (*)    LACTIC ACID, PLASMA - Abnormal    Lactic Acid 2.4 (*)    LACTIC ACID, PLASMA - Abnormal    Lactic Acid 3.0 (*)    POCT URINE PREGNANCY - Abnormal    POC Preg Test, Ur Positive (*)      Acceptable Yes     INFLUENZA A & B BY MOLECULAR - Normal    INFLUENZA A MOLECULAR Negative      INFLUENZA B MOLECULAR   Negative     TROPONIN I - Normal    Troponin I High Sensitivity 9.7     SARS-COV-2 RNA AMPLIFICATION, QUAL - Normal    SARS COV-2 Molecular Negative      Narrative:     Negative SARS-CoV results should not be used as the sole basis for treatment or patient management decisions; negative results should be considered in the context of a patient's recent exposures, history and the presene of clinical signs and symptoms consistent with COVID-19.  Negative results should be treated as presumptive and confirmed by molecular assay, if necessary for patient management.   CULTURE, BLOOD    Culture, Blood No Growth At 24 Hours     CBC W/ AUTO DIFFERENTIAL    Narrative:     The following orders were created for panel order CBC auto differential.  Procedure                               Abnormality         Status                     ---------                               -----------         ------                     CBC with Differential[[id number]]       Abnormal            Final result                 Please view results for these tests on the individual orders.   HCG, TOTAL, QUANTITATIVE    HCG Quantitative 8     TYPE & SCREEN    Specimen Outdate 08/09/2024 23:59      Group & Rh O POS      Indirect Chanda NEG            Imaging Results              MRI MRCP Abdomen WO Contrast 3D WO Independent WS XPD (Final result)  Result time 08/07/24 15:06:17      Final result by Sujit Griffiths DO (08/07/24 15:06:17)                   Impression:      Innumerable heterogeneous T2 lesions demonstrated diffusely throughout the liver suggestive of diffuse metastatic disease. Representative within the left hepatic lobe measures up to 4 cm in axial dimension.  The gallbladder is mildly to moderately distended.  There is a stone within the gallbladder in the region of the neck measuring up to 1.9 cm.  If there is clinical concern for cholecystitis, nuclear medicine hepatobiliary imaging study may be of benefit for further evaluation.   Small volume upper abdominal ascites. Suspicious nodularity of the lung bases. Consider chest CT a when clinically feasible.  Other/detailed findings as above.    Point of Service: [facility]      Electronically signed by: Sujit Griffiths  Date:    08/07/2024  Time:    15:06               Narrative:    EXAMINATION:  MRI MRCP ABDOMEN WO CONTRAST 3D WO INDEPENDENT WS XPD    CLINICAL HISTORY:  Biliary obstruction;    COMPARISON:  Abdominal ultrasound August 6, 2024    TECHNIQUE:  Multiplanar multisequence magnetic resonance imaging of the abdomen is performed without the use of intravenous contrast.    FINDINGS:  Suspicious nodularity of the lung bases.  Consider chest CT when clinically feasible.  Innumerable heterogeneous T2 lesions demonstrated diffusely throughout the liver suggestive of diffuse metastatic disease.  Representative within the left hepatic lobe measures up to 4 cm in axial dimension.  The gallbladder is mildly to moderately distended.  There is a stone within the gallbladder in the region of the neck measuring up to 1.9 cm.  If there is clinical concern for cholecystitis, nuclear medicine hepatobiliary imaging study may be of benefit for further evaluation.  No significant intrahepatic or extrahepatic ductal dilatation.  No abnormal pancreatic ductal dilatation.  Spleen grossly unremarkable.  Bilateral adrenal glands grossly unremarkable.  Simple appearing superior pole right renal cyst measuring up to 2.5 cm.  No specific imaging follow-up recommended for this/these cyst/cysts.  No evidence of hydronephrosis.  Small volume upper abdominal ascites.  Significant levoconvex curvature of the spine with apex near the thoracic/lumbar junction.                                       US Lower Extremity Veins Bilateral (Final result)  Result time 08/07/24 07:18:10      Final result by Sujit Griffiths DO (08/07/24 07:18:10)                   Impression:      No evidence of deep venous thrombosis in  either lower extremity.    Point of Service: [facility]      Electronically signed by: Sujit Griffiths  Date:    08/07/2024  Time:    07:18               Narrative:    EXAMINATION:  US LOWER EXTREMITY VEINS BILATERAL    CLINICAL HISTORY:  Other specified soft tissue disorders    COMPARISON:  None.    TECHNIQUE:  Grayscale, spectral, and color Doppler interrogation of the bilateral lower extremity veins was performed. Augmentation and compression was performed.    FINDINGS:  Grayscale, color Doppler, and pulsed Doppler evaluation of the veins of the bilateral lower extremity demonstrate no evidence of deep venous thrombosis.                                       US Abdomen Complete (Final result)  Result time 08/07/24 07:45:56      Final result by Dany Sultana MD (08/07/24 07:45:56)                   Impression:      Innumerable hepatic lesions are nonspecific by this modality.  Correlate with patient's symptoms.  Dedicated CT or MRI without and with IV contrast recommended when feasible.  The history of patient's current superficial infection is noted.  Multifocal abscesses are a consideration although the ultrasound appearance is could also include solid lesion.    Cholelithiasis without convincing evidence of cholecystitis.  Mild prominence of the common bile duct.    Trace perihepatic ascites.      Electronically signed by: Dany Sultana  Date:    08/07/2024  Time:    07:45               Narrative:    EXAMINATION:  US ABDOMEN COMPLETE    CLINICAL HISTORY:  Unspecified abdominal pain    TECHNIQUE:  Complete abdominal ultrasound (including pancreas, aorta, liver, gallbladder, common bile duct, IVC, kidneys, and spleen) was performed.  Real-time ultrasound images captured and stored.    COMPARISON:  None    FINDINGS:  Pancreas: Obscured by overlying bowel gas.    Aorta: Not seen due to shadowing from bowel gas.    Liver: 23.6 cm, enlarged in size.  There are innumerable lesions identified throughout the  liver some of which have a hypoechoic central component.    Gallbladder: Shadowing gallstone is seen at the gallbladder neck 2.2 cm.  Negative sonographic James sign.  There is some gallbladder wall thickening suggested of 4-5 mm.    Biliary system: 8 mm common bile duct.  No intrahepatic ductal dilatation.    Inferior vena cava: Normal in appearance.    Right kidney: 10.7 cm. No hydronephrosis.  Simple cyst upper pole.    Left kidney: 11.9 cm. No hydronephrosis.    Spleen: 12.6 cm.  Normal in size with homogeneous echotexture.    Miscellaneous: There is trace perihepatic ascites.                                       US OB <14 Wks TransAbd & TransVag, Single Gestation (XPD) (Final result)  Result time 08/07/24 07:49:52   Procedure changed from US OB <14 Wks, TransAbd, Single Gestation     Final result by Dany Sultana MD (08/07/24 07:49:52)                   Impression:      No intrauterine gestation detected.    Ascites.  Liver abnormalities better detected on concurrent ultrasound.      Electronically signed by: Dany Sultana  Date:    08/07/2024  Time:    07:49               Narrative:    EXAMINATION:  US OB <14 WEEKS, TRANSABDOM & TRANSVAG, SINGLE GESTATION (XPD)    CLINICAL HISTORY:  pain; Unspecified abdominal pain    TECHNIQUE:  Transabdominal sonography of the pelvis was performed, followed by transvaginal sonography to better evaluate the uterus and ovaries.  Real-time ultrasound images captured and stored.    COMPARISON:  None.    FINDINGS:  No intrauterine gestational sac identified.  Uterus appears normal measuring 8 x 3.9 x 4.6 cm.  Endometrial stripe 0.6 cm.  Unable to identified the ovaries Ultram is a abdominal normal transvaginal imaging.    There is fair degree of abdominal and pelvic ascites that is seen.  The liver abnormalities are better depicted on the abdomen ultrasound.                                       Medications   piperacillin-tazobactam (ZOSYN) 4.5 g in D5W 100 mL IVPB (MB+) (0 g  Intravenous Stopped 8/6/24 2309)   HYDROmorphone (PF) injection 0.5 mg (0.5 mg Intravenous Given 8/6/24 2339)   vancomycin (VANCOCIN) 1,750 mg in D5W 500 mL IVPB (0 mg Intravenous Stopped 8/7/24 0322)   sodium chloride 0.9% bolus 2,000 mL 2,000 mL (0 mLs Intravenous Stopped 8/7/24 0420)   piperacillin-tazobactam (ZOSYN) 4.5 g in D5W 100 mL IVPB (MB+) (0 g Intravenous Stopped 8/7/24 0605)   sodium chloride 0.9% bolus 1,000 mL 1,000 mL (0 mLs Intravenous Stopped 8/7/24 0613)   0.9%  NaCl infusion (0 mLs Intravenous Stopped 8/7/24 1856)   HYDROmorphone (PF) injection 1 mg (1 mg Intravenous Given 8/7/24 1611)   ondansetron injection 4 mg (4 mg Intravenous Given 8/7/24 1612)     Medical Decision Making  38-year-old female presents to the emergency department for evaluation of shortness of breath, dizziness, constipation, wound to right upper chest.  Reports that shortness of breath is new onset and occurs on exertion.  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  States that now the wound has spread and opened with yellow drainage. Denies chest pain, headache, nausea/vomiting, neck pain/stiffness, fever, chills, abdominal pain, back pain, dysuria.  Ordered and reviewed labs with results significant for elevated WBC, liver enzymes, lactic acid, d-dimer  Ordered and reviewed urinalysis with results significant for urinary tract infection  Ordered and reviewed ob ultrasound as well as radiologist's interpretation with results significant for no intrauterine gestation detected. Ascites.  Liver abnormalities better detected on concurrent ultrasound.  Ordered and reviewed abdominal ultrasound as well as radiologist's interpretation with results significant for innumerable hepatic lesions are nonspecific by this modality.  Correlate with patient's symptoms.  Dedicated CT or MRI without and with IV contrast  recommended when feasible.  The history of patient's current superficial infection is noted.  Multifocal abscesses are a consideration although the ultrasound appearance is could also include solid lesion. Cholelithiasis without convincing evidence of cholecystitis.  Mild prominence of the common bile duct. Trace perihepatic ascites.  Ordered and reviewed bilateral lower extremity doppler as well as radiologist's interpretation with results significant for no evidence of deep venous thrombosis in either lower extremity.  PFC initiated, patient transferred to [facility] in [place]  Diagnosis: Shortness of breath, choledocholithiasis with acute cholecysitis, chronic wound, leg swelling    Amount and/or Complexity of Data Reviewed  Labs: ordered.  Radiology: ordered.    Risk  Prescription drug management.               ED Course as of 08/08/24 2359   Tue Aug 06, 2024   1829 Patient evaluated managed by attending physician and by nurse practitioner.  According to the history and physical exam taken by the physician at 6:15 p.m. is that patient has been having 2 week history of worsening generalized fatigue and vague shortness of breath.  Also she has become pale.  She was had a history of anemia previously that was diagnosed when she was trying to give blood but has not sought the care of a doctor for that.  She does follow occasionally with the primary care in [facility].  Patient reports that she has no diagnosed medical conditions but did have a D&C back in May.  She had had a pregnancy that started in late January and had a miscarriage in February ultimately leading to a D&C.  Also she has had a wound on her right upper chest wall for the past 10 years after she had stabbed herself with some scissors doing some cosmetology work and had develop into a big scar.  Over the past several months the scar is turn into a large ulceration proximally 5 cm deep in his subcutaneous tissue.  She does have a family  history of breast cancer but no actual breast mass.  This is in the right chest wall superior to the breast tissue.  She denies fever or chills.  She also reports some abdominal distention and discomfort and associated constipation.  She was still passing some stools but the stools are hard and not regular.  He was been no associated vomiting.  Physical exam shows patient was pale.  She is in no distress.  There is some mild tachycardia.  Heart lung sounds are normal.  Abdominal exam is protuberant abdomen with active bowel sounds with no tenderness.  Examination of the axilla shows no adenopathy.  The right chest wall has a 5 cm ulceration deep into the subcutaneous tissue with no purulent drainage.  Will obtain blood work and x-ray.  Will consider use of CT pending lab results [PK]   2136 Reviewed ultrasound findings with the ultrasonographer during exam.  Patient does have gallbladder wall thickening 5 mm.  She has a stone in the neck of the gallbladder that has not mobile.  Common bile duct is dilated at 7.6 mm.  She was copious amount of ascites.  She was some severe nodularity of the liver.  Intrauterine lining appeared normal no IUP present.  She does have fluid throughout the abdomen and in the pelvis.  Ovaries were not visualized.  No adnexal masses identified [PK]   2142 Discussed with general surgeon.  Recommends transfer to a facility that has ERCP capable [PK]   2147 Will start process to transfer to a facility with ERCP capability and with OBGYN.  Patient tested positive for pregnancy with a beta HCG of 8.  Home pregnancy test was negative 2 days ago.  Patient had a D&C 2 months ago with downtrending beta HCG.  Patient presented to the ED with generalized abdominal pain and swelling and generalized weakness.  She was found to have elevated white count bilirubin of 5 markedly elevated alkaline phosphatase and elevated transaminases.  Ultrasound shows nonmobile stone in the gallbladder neck.  Gallbladder  wall is thickened and there is diffuse nodularity to the liver.  Common bile duct is dilated at 7.6 mm.  Patient has elevated white count. [PK]   2147 In regard to the wound on her right upper chest wall this started 10 years ago with an injury.  Likely this is not related to the problems with her hepatobiliary system [PK]   Wed Aug 07, 2024   0041 Patient does have multiple was a lesions of the liver.  Cancer and pyogenic abscesses will be in the differential.  We will broaden spectrum with vancomycin [PK]   0228 Diffuse ascites and hypoalbuminemia will start with 2 L more fluids at this time could cause worse 3rd spacing will re-evaluate [PK]   0249 Benefits outweigh risks of vancomycin.  Uncertain if pertinent patient life-threatening illness.  Does have large skin lesion the hepatic findings can be pyogenic abscesses.  Still trying to find the patient a accepting facility [PK]   0531 Capillary refill is brisk perfusion exam done [PK]   0531 This patient does have evidence of infective focus  My overall impression is cholangitis, skin infection, liver masses or abscesses.  Source: Skin and Soft Tissue (location chest and abdomin/ liver gallbladder)  Antibiotics given- Antibiotics (72h ago, onward)    Start     Stop Route Frequency Ordered    08/07/24 0445  piperacillin-tazobactam (ZOSYN) 4.5 g in D5W 100 mL IVPB   (MB+)         08/07/24 1644 IV ED 1 Time 08/07/24 0444      Latest lactate reviewed-  @[id number](lactate,poclac)@  Organ dysfunction indicated by none    Fluid challenge Actual Body weight- Patient will receive 30ml/kg actual body weight to calculate fluid bolus for treatment of septic shock.     Post- resuscitation assessment Yes Perfusion exam was performed within 6 hours of septic shock presentation after bolus shows Adequate tissue perfusion assessed by non-invasive monitoring       Will Not start Pressors- Levophed for MAP of 65  Source control achieved by: antibiotics iv     [PK]   0534 Patient  awake and alert. [PK]   0559 Care transferred to Dr. Helms [PK]   0913 Patient is awaiting MRCP and transfer to another facility. [BB]   1053 On my examination patient is doing fine.  She is awaiting MRI and still awaiting transfer. [BB]   1404 Patient was accepted in transfer to [facility]. [BB]      ED Course User Index  [BB] Erik Helms MD  [PK] Charlie Roman MD                           Clinical Impression:   Final diagnoses:  [R06.02] Shortness of breath  [R06.02] SOB (shortness of breath)  [R10.9] Abdominal pain  [M79.89] Leg swelling  [K80.42] Choledocholithiasis with acute cholecystitis (Primary)  [Z34.90] Pregnancy, unspecified gestational age  [T14.8XXA] Chronic wound        ED Disposition Condition    Transfer to Another Facility Stable                Josse German NP  08/08/24 2994

## 2024-08-07 NOTE — ED NOTES
[employer] [facility] request completed for transport to [facility].  Confirmation #: [id number]  Submission Date / Time: Aug 7, 2024 3:22 PM

## 2024-08-08 LAB — VERIGENE RESULT: ABNORMAL

## 2024-08-09 LAB
OHS QRS DURATION: 92 MS
OHS QTC CALCULATION: 433 MS

## 2024-08-10 LAB
BACTERIA BLD CULT: ABNORMAL
GRAM STN SPEC: ABNORMAL

## 2024-08-12 LAB — BACTERIA BLD CULT: NORMAL

## 2024-08-15 ENCOUNTER — HOSPITAL ENCOUNTER (INPATIENT)
Facility: HOSPITAL | Age: 39
LOS: 1 days | Discharge: HOSPICE | DRG: 598 | End: 2024-08-16
Attending: INTERNAL MEDICINE | Admitting: FAMILY MEDICINE
Payer: MEDICAID

## 2024-08-15 DIAGNOSIS — C50.919: ICD-10-CM

## 2024-08-15 DIAGNOSIS — C78.7 ADENOCARCINOMA OF RIGHT BREAST METASTATIC TO LIVER: Primary | ICD-10-CM

## 2024-08-15 DIAGNOSIS — C50.919 ADENOCARCINOMA OF BREAST METASTATIC TO LIVER, UNSPECIFIED LATERALITY: ICD-10-CM

## 2024-08-15 DIAGNOSIS — C78.7: ICD-10-CM

## 2024-08-15 DIAGNOSIS — C50.911 ADENOCARCINOMA OF RIGHT BREAST METASTATIC TO LIVER: Primary | ICD-10-CM

## 2024-08-15 DIAGNOSIS — C79.9 METASTATIC DISEASE: ICD-10-CM

## 2024-08-15 DIAGNOSIS — C78.7 ADENOCARCINOMA OF BREAST METASTATIC TO LIVER, UNSPECIFIED LATERALITY: ICD-10-CM

## 2024-08-15 DIAGNOSIS — Z51.5 PALLIATIVE CARE ENCOUNTER: ICD-10-CM

## 2024-08-15 PROCEDURE — 11000001 HC ACUTE MED/SURG PRIVATE ROOM

## 2024-08-16 VITALS
HEART RATE: 88 BPM | RESPIRATION RATE: 18 BRPM | SYSTOLIC BLOOD PRESSURE: 115 MMHG | OXYGEN SATURATION: 97 % | TEMPERATURE: 97 F | DIASTOLIC BLOOD PRESSURE: 80 MMHG

## 2024-08-16 PROBLEM — C78.7 ADENOCARCINOMA OF BREAST METASTATIC TO LIVER: Status: ACTIVE | Noted: 2024-08-16

## 2024-08-16 PROBLEM — Z51.5 PALLIATIVE CARE ENCOUNTER: Status: ACTIVE | Noted: 2024-08-16

## 2024-08-16 PROBLEM — C50.919 ADENOCARCINOMA OF BREAST METASTATIC TO LIVER: Status: ACTIVE | Noted: 2024-08-16

## 2024-08-16 LAB
ALBUMIN SERPL BCP-MCNC: 1.6 G/DL (ref 3.5–5)
ALBUMIN/GLOB SERPL: 0.4 {RATIO}
ALP SERPL-CCNC: >2330 U/L (ref 37–98)
ALT SERPL W P-5'-P-CCNC: 118 U/L (ref 13–56)
AMMONIA PLAS-SCNC: 49 ΜMOL/L (ref 11–32)
ANION GAP SERPL CALCULATED.3IONS-SCNC: 15 MMOL/L (ref 7–16)
ANISOCYTOSIS BLD QL SMEAR: ABNORMAL
APTT PPP: 34.9 SECONDS (ref 25.2–37.3)
AST SERPL W P-5'-P-CCNC: 563 U/L (ref 15–37)
BASOPHILS # BLD AUTO: 0.08 K/UL (ref 0–0.2)
BASOPHILS NFR BLD AUTO: 0.3 % (ref 0–1)
BILIRUB SERPL-MCNC: 8.9 MG/DL (ref ?–1.2)
BUN SERPL-MCNC: 65 MG/DL (ref 7–18)
BUN/CREAT SERPL: 34 (ref 6–20)
CALCIUM SERPL-MCNC: 9.8 MG/DL (ref 8.5–10.1)
CHLORIDE SERPL-SCNC: 101 MMOL/L (ref 98–107)
CO2 SERPL-SCNC: 18 MMOL/L (ref 21–32)
CREAT SERPL-MCNC: 1.93 MG/DL (ref 0.55–1.02)
DIFFERENTIAL METHOD BLD: ABNORMAL
EGFR (NO RACE VARIABLE) (RUSH/TITUS): 34 ML/MIN/1.73M2
EOSINOPHIL # BLD AUTO: 0.21 K/UL (ref 0–0.5)
EOSINOPHIL NFR BLD AUTO: 0.8 % (ref 1–4)
ERYTHROCYTE [DISTWIDTH] IN BLOOD BY AUTOMATED COUNT: 27.2 % (ref 11.5–14.5)
EST. AVERAGE GLUCOSE BLD GHB EST-MCNC: 88 MG/DL
GLOBULIN SER-MCNC: 4.5 G/DL (ref 2–4)
GLUCOSE SERPL-MCNC: 121 MG/DL (ref 74–106)
HBA1C MFR BLD HPLC: 4.7 % (ref 4.5–6.6)
HCT VFR BLD AUTO: 37 % (ref 38–47)
HGB BLD-MCNC: 11.3 G/DL (ref 12–16)
IMM GRANULOCYTES # BLD AUTO: 1.58 K/UL (ref 0–0.04)
IMM GRANULOCYTES NFR BLD: 5.9 % (ref 0–0.4)
INR BLD: 1.47
LYMPHOCYTES # BLD AUTO: 3.45 K/UL (ref 1–4.8)
LYMPHOCYTES NFR BLD AUTO: 12.8 % (ref 27–41)
LYMPHOCYTES NFR BLD MANUAL: 10 % (ref 27–41)
MAGNESIUM SERPL-MCNC: 3.3 MG/DL (ref 1.7–2.3)
MCH RBC QN AUTO: 24.8 PG (ref 27–31)
MCHC RBC AUTO-ENTMCNC: 30.5 G/DL (ref 32–36)
MCV RBC AUTO: 81.3 FL (ref 80–96)
METAMYELOCYTES NFR BLD MANUAL: 2 %
MONOCYTES # BLD AUTO: 2.37 K/UL (ref 0–0.8)
MONOCYTES NFR BLD AUTO: 8.8 % (ref 2–6)
MONOCYTES NFR BLD MANUAL: 7 % (ref 2–6)
MPC BLD CALC-MCNC: 10.6 FL (ref 9.4–12.4)
NEUTROPHILS # BLD AUTO: 19.25 K/UL (ref 1.8–7.7)
NEUTROPHILS NFR BLD AUTO: 71.4 % (ref 53–65)
NEUTS BAND NFR BLD MANUAL: 5 % (ref 1–5)
NEUTS SEG NFR BLD MANUAL: 76 % (ref 50–62)
NRBC # BLD AUTO: 0.09 X10E3/UL
NRBC, AUTO (.00): 0.3 %
PLATELET # BLD AUTO: 111 K/UL (ref 150–400)
PLATELET MORPHOLOGY: ABNORMAL
POLYCHROMASIA BLD QL SMEAR: ABNORMAL
POTASSIUM SERPL-SCNC: 5 MMOL/L (ref 3.5–5.1)
PROT SERPL-MCNC: 6.1 G/DL (ref 6.4–8.2)
PROTHROMBIN TIME: 17.7 SECONDS (ref 11.7–14.7)
RBC # BLD AUTO: 4.55 M/UL (ref 4.2–5.4)
SODIUM SERPL-SCNC: 129 MMOL/L (ref 136–145)
WBC # BLD AUTO: 26.94 K/UL (ref 4.5–11)

## 2024-08-16 PROCEDURE — 36415 COLL VENOUS BLD VENIPUNCTURE: CPT | Performed by: HOSPITALIST

## 2024-08-16 PROCEDURE — 83735 ASSAY OF MAGNESIUM: CPT | Performed by: HOSPITALIST

## 2024-08-16 PROCEDURE — 99499 UNLISTED E&M SERVICE: CPT | Mod: ,,, | Performed by: STUDENT IN AN ORGANIZED HEALTH CARE EDUCATION/TRAINING PROGRAM

## 2024-08-16 PROCEDURE — 85610 PROTHROMBIN TIME: CPT | Performed by: HOSPITALIST

## 2024-08-16 PROCEDURE — 83036 HEMOGLOBIN GLYCOSYLATED A1C: CPT | Performed by: HOSPITALIST

## 2024-08-16 PROCEDURE — 85730 THROMBOPLASTIN TIME PARTIAL: CPT | Performed by: HOSPITALIST

## 2024-08-16 PROCEDURE — 25000003 PHARM REV CODE 250: Performed by: FAMILY MEDICINE

## 2024-08-16 PROCEDURE — 80053 COMPREHEN METABOLIC PANEL: CPT | Performed by: HOSPITALIST

## 2024-08-16 PROCEDURE — 85025 COMPLETE CBC W/AUTO DIFF WBC: CPT | Performed by: HOSPITALIST

## 2024-08-16 PROCEDURE — 25000003 PHARM REV CODE 250: Performed by: HOSPITALIST

## 2024-08-16 PROCEDURE — 63600175 PHARM REV CODE 636 W HCPCS: Performed by: HOSPITALIST

## 2024-08-16 PROCEDURE — 82140 ASSAY OF AMMONIA: CPT | Performed by: HOSPITALIST

## 2024-08-16 PROCEDURE — 99235 HOSP IP/OBS SAME DATE MOD 70: CPT | Mod: ,,, | Performed by: HOSPITALIST

## 2024-08-16 RX ORDER — PROMETHAZINE HYDROCHLORIDE 25 MG/1
25 TABLET ORAL EVERY 6 HOURS PRN
Qty: 30 TABLET | Refills: 0 | Status: SHIPPED | OUTPATIENT
Start: 2024-08-16

## 2024-08-16 RX ORDER — OXYCODONE AND ACETAMINOPHEN 10; 325 MG/1; MG/1
1 TABLET ORAL EVERY 4 HOURS PRN
Qty: 30 TABLET | Refills: 0 | Status: SHIPPED | OUTPATIENT
Start: 2024-08-16 | End: 2024-08-21

## 2024-08-16 RX ORDER — MUPIROCIN 20 MG/G
OINTMENT TOPICAL 2 TIMES DAILY
Status: DISCONTINUED | OUTPATIENT
Start: 2024-08-16 | End: 2024-08-16 | Stop reason: HOSPADM

## 2024-08-16 RX ORDER — DIPHENHYDRAMINE HYDROCHLORIDE 50 MG/ML
50 INJECTION INTRAMUSCULAR; INTRAVENOUS EVERY 6 HOURS PRN
Status: DISCONTINUED | OUTPATIENT
Start: 2024-08-16 | End: 2024-08-16 | Stop reason: HOSPADM

## 2024-08-16 RX ORDER — HYDROMORPHONE HYDROCHLORIDE 2 MG/ML
2 INJECTION, SOLUTION INTRAMUSCULAR; INTRAVENOUS; SUBCUTANEOUS EVERY 4 HOURS PRN
Status: DISCONTINUED | OUTPATIENT
Start: 2024-08-16 | End: 2024-08-16

## 2024-08-16 RX ORDER — ONDANSETRON 4 MG/1
4 TABLET, ORALLY DISINTEGRATING ORAL EVERY 6 HOURS PRN
Qty: 60 TABLET | Refills: 0 | Status: SHIPPED | OUTPATIENT
Start: 2024-08-16

## 2024-08-16 RX ORDER — ONDANSETRON HYDROCHLORIDE 2 MG/ML
8 INJECTION, SOLUTION INTRAVENOUS EVERY 6 HOURS PRN
Status: DISCONTINUED | OUTPATIENT
Start: 2024-08-16 | End: 2024-08-16 | Stop reason: HOSPADM

## 2024-08-16 RX ORDER — HYDROMORPHONE HYDROCHLORIDE 2 MG/ML
1 INJECTION, SOLUTION INTRAMUSCULAR; INTRAVENOUS; SUBCUTANEOUS EVERY 4 HOURS PRN
Status: DISCONTINUED | OUTPATIENT
Start: 2024-08-16 | End: 2024-08-16 | Stop reason: HOSPADM

## 2024-08-16 RX ADMIN — RIFAXIMIN 550 MG: 550 TABLET ORAL at 09:08

## 2024-08-16 RX ADMIN — HYDROMORPHONE HYDROCHLORIDE 1 MG: 2 INJECTION, SOLUTION INTRAMUSCULAR; INTRAVENOUS; SUBCUTANEOUS at 09:08

## 2024-08-16 RX ADMIN — MUPIROCIN: 20 OINTMENT TOPICAL at 09:08

## 2024-08-16 RX ADMIN — HYDROMORPHONE HYDROCHLORIDE 2 MG: 2 INJECTION, SOLUTION INTRAMUSCULAR; INTRAVENOUS; SUBCUTANEOUS at 12:08

## 2024-08-16 NOTE — PLAN OF CARE
Problem: Adult Inpatient Plan of Care  Goal: Plan of Care Review  Outcome: Adequate for Care Transition  Goal: Patient-Specific Goal (Individualized)  Outcome: Adequate for Care Transition  Goal: Absence of Hospital-Acquired Illness or Injury  Outcome: Adequate for Care Transition  Goal: Optimal Comfort and Wellbeing  Outcome: Adequate for Care Transition  Goal: Readiness for Transition of Care  Outcome: Adequate for Care Transition     Problem: Wound  Goal: Optimal Coping  Outcome: Adequate for Care Transition  Goal: Optimal Functional Ability  Outcome: Adequate for Care Transition  Goal: Absence of Infection Signs and Symptoms  Outcome: Adequate for Care Transition  Goal: Improved Oral Intake  Outcome: Adequate for Care Transition  Goal: Optimal Pain Control and Function  Outcome: Adequate for Care Transition  Goal: Skin Health and Integrity  Outcome: Adequate for Care Transition  Goal: Optimal Wound Healing  Outcome: Adequate for Care Transition     Problem: Skin Injury Risk Increased  Goal: Skin Health and Integrity  Outcome: Adequate for Care Transition

## 2024-08-16 NOTE — NURSING
Sitting up in bed up in bed.  Resting with eyes closed.  Skin cool to touch et dry.  Resp even et unlabored.  Easily aroused and responsive.  Discharge teaching completed with significant other including instructions to  d/c meds from pharmacy of record.  Expressed understanding and denies add'l questions or concerns at this time.  Safety measures in place et maintained.

## 2024-08-16 NOTE — H&P
[facility]  [facility] Medicine  History & Physical    Patient Name: Sidra Winter  MRN: [id number]  Patient Class: IP- Inpatient  Admission Date: 8/15/2024  Attending Physician: Mateo Craig Jr., MD   Primary Care Provider: Braxton Ornelas MD         Patient information was obtained from patient, relative(s), past medical records, and ER records.     Subjective:     Principal Problem:Adenocarcinoma of breast metastatic to liver    Chief Complaint: No chief complaint on file.       HPI: 37 yo F transferred from [facility] for palliative care due to agreement with [facility] to accept following ERCP.  Patient is lethargic but oriented and able to answer questions appropriately.  Her sister is an RN and her first cousin is a  and they have been extremely helpful in gathering a history.  Very nice and family and great patient support.  Patient's father and stepmother are here and her mother is here and asking about a second opinion.  The others state that they just wanted her transferred back close to home ([place] [facility] with Dr. Ornelas as PCP) for palliative care.  I have reviewed Dr. Jasmyne Chavez's pallitative care note.  Patient has had a drain placed for ascites and comfort care.      Patient was seen in PCP office 8/6/24 for abscess on right breast.  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  She does not take any home meds and only PMH is some anxiety and back pain from scoliosis.  Patient presented to [facility] ED that evening with worsening dyspnea and was found to have multiple lesions in her liver and she was transferred to [facility] for ERCP.  Pathology on liver lesion showed malignant cells.  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  Prognosis was that she may  not survive the week and they wanted to be closer to home.  Patient has a 22 month old son with the same red hair and blue eyes.  Her  is at the bedside and helping her transfer and change her gown.      Remainder of ROS as below.  See assessment and plan below for problem based evaluation         Past Medical History:   Diagnosis Date    Anxiety disorder, unspecified     Scoliosis        Past Surgical History:   Procedure Laterality Date     SECTION  2021     SECTION N/A 10/13/2022    Procedure:  SECTION;  Surgeon: Mario Foster MD;  Location: [facility] L&D;  Service: OB/GYN;  Laterality: N/A;    CLAVICLE SURGERY      DIAGNOSTIC LAPAROSCOPY N/A 2024    Procedure: LAPAROSCOPY, DIAGNOSTIC;  Surgeon: Mario Foster MD;  Location: [facility] OR;  Service: OB/GYN;  Laterality: N/A;    DILATION AND CURETTAGE OF UTERUS USING SUCTION N/A 2024    Procedure: DILATION AND CURETTAGE, UTERUS, USING SUCTION;  Surgeon: Mario Foster MD;  Location: [facility] OR;  Service: OB/GYN;  Laterality: N/A;       Review of patient's allergies indicates:   Allergen Reactions    Adhesive Rash     Redness, pulls skin off       Current Facility-Administered Medications on File Prior to Encounter   Medication    [DISCONTINUED] lactated ringers infusion    [DISCONTINUED] lactulose 20 gram/30 mL solution Soln    [DISCONTINUED] ondansetron injection     Current Outpatient Medications on File Prior to Encounter   Medication Sig    calcium carbonate (OS-JAEVD) 600 mg calcium (1,500 mg) Tab Take 600 mg by mouth once daily. (Patient not taking: Reported on 2024)    HYDROcodone-acetaminophen (NORCO) 7.5-325 mg per tablet Take 1 tablet by mouth every 6 (six) hours as needed for Pain. (Patient not taking: Reported on 2024)    meloxicam (MOBIC) 15 MG tablet Take 15 mg by mouth once daily.    omega-3 fatty acids/fish oil (FISH OIL-OMEGA-3 FATTY ACIDS) 300-1,000 mg capsule Take 2  capsules by mouth once daily. (Patient not taking: Reported on 8/6/2024)    prenatal no115/iron/folic acid (PRENATAL 19 ORAL) Take 1 tablet by mouth once daily. (Patient not taking: Reported on 8/6/2024)     Family History       Problem Relation (Age of Onset)    Ankylosing spondylitis Mother    Breast cancer Maternal Grandfather, Maternal Aunt    Diabetes Paternal Grandfather, Maternal Grandfather    Gestational diabetes Brother    Irritable bowel syndrome Mother    Osteoarthritis Mother    Raynaud syndrome Mother    Sjogren's syndrome Mother          Tobacco Use    Smoking status: Former     Passive exposure: Never    Smokeless tobacco: Never   Substance and Sexual Activity    Alcohol use: Not Currently     Comment: Socially    Drug use: Not Currently    Sexual activity: Yes     Partners: Male     Birth control/protection: None     Review of Systems   Constitutional:  Positive for appetite change and fatigue. Negative for chills, fever and unexpected weight change.   HENT:  Negative for congestion, mouth sores, nosebleeds, sinus pain, sore throat and trouble swallowing.    Respiratory:  Negative for apnea, cough, chest tightness and shortness of breath.    Cardiovascular:  Positive for leg swelling. Negative for chest pain and palpitations.   Gastrointestinal:  Positive for abdominal pain and nausea. Negative for blood in stool, constipation, diarrhea and vomiting.   Endocrine: Negative for polyuria.   Genitourinary:  Negative for decreased urine volume, difficulty urinating, dysuria and frequency.   Musculoskeletal:  Negative for arthralgias, back pain and neck pain.   Skin:  Negative for rash.   Neurological:  Negative for syncope, light-headedness and headaches.   Hematological:  Does not bruise/bleed easily.   Psychiatric/Behavioral:  Positive for confusion. Negative for agitation and suicidal ideas.      Objective:     Vital Signs (Most Recent):  Temp: 97.5 °F (36.4 °C) (08/16/24 0022)  Pulse: 95 (08/16/24  0022)  Resp: 20 (08/16/24 0022)  BP: 108/77 (08/16/24 0022)  SpO2: 99 % (08/16/24 0022) Vital Signs (24h Range):  Temp:  [97.5 °F (36.4 °C)-97.7 °F (36.5 °C)] 97.5 °F (36.4 °C)  Pulse:  [95-96] 95  Resp:  [14-20] 20  SpO2:  [95 %-99 %] 99 %  BP: (107-121)/(74-82) 108/77        There is no height or weight on file to calculate BMI.     Physical Exam  Vitals and nursing note reviewed. Exam conducted with a chaperone present.   Constitutional:       Appearance: Normal appearance.   HENT:      Head: Atraumatic.      Mouth/Throat:      Mouth: Mucous membranes are moist.      Pharynx: Oropharynx is clear.   Eyes:      Conjunctiva/sclera: Conjunctivae normal.      Pupils: Pupils are equal, round, and reactive to light.   Neck:      Vascular: No carotid bruit.   Cardiovascular:      Rate and Rhythm: Normal rate and regular rhythm.      Pulses: Normal pulses.      Heart sounds: Normal heart sounds.   Pulmonary:      Effort: Pulmonary effort is normal.      Breath sounds: Rales present. No wheezing or rhonchi.   Abdominal:      General: Bowel sounds are normal. There is distension.      Tenderness: There is abdominal tenderness. There is no right CVA tenderness, left CVA tenderness, guarding or rebound.   Musculoskeletal:         General: No deformity or signs of injury. Normal range of motion.      Cervical back: Neck supple.      Right lower leg: Edema present.      Left lower leg: Edema present.   Skin:     General: Skin is warm and dry.      Capillary Refill: Capillary refill takes less than 2 seconds.      Coloration: Skin is not jaundiced or pale.      Findings: No bruising, lesion or rash.   Neurological:      General: No focal deficit present.      Mental Status: She is alert and oriented to person, place, and time.   Psychiatric:         Mood and Affect: Mood normal.              CRANIAL NERVES     CN III, IV, VI   Pupils are equal, round, and reactive to light.       Significant Labs: All pertinent labs within the  past 24 hours have been reviewed.    Significant Imaging: I have reviewed all pertinent imaging results/findings within the past 24 hours.  Assessment/Plan:     * Adenocarcinoma of breast metastatic to liver  No staging noted.  Patient transferred closer to home for palliative care.  Her mother has asked for a second opinion but her father, stepmother, sister who is an RN and first cousin who is like a sister and is a  , agree with palliative care.  Patient requests palliative care.  She did state that the lactulose is terrible and she has refused previous doses so will start rifaximin and all have agreed to am labs to get an idea of where we are at the moment.      Dilauded for pain.  Zofran for N/V and benadryl for anxiety and/or itching.      Patient with condition that if not treated could result in loss of life or bodily function.  Due to co morbidities this patient has complex medical management.                VTE Risk Mitigation (From admission, onward)      None                            Silvia Myles MD  Department of Hospital Medicine  [facility]

## 2024-08-16 NOTE — DISCHARGE SUMMARY
[facility]  [facility] Medicine  Discharge Summary      Patient Name: Sidra Winter  MRN: [id number]  FLYNN: [id number]  Patient Class: IP- Inpatient  Admission Date: 8/15/2024  Hospital Length of Stay: 1 days  Discharge Date and Time:  08/16/2024 10:10 AM  Attending Physician: Luis Encinas DO   Discharging Provider: Luis Encinas DO  Primary Care Provider: Braxton Ornelas MD    Primary Care Team: Networked reference to record PCT     HPI:   37 yo F transferred from [facility] for palliative care due to agreement with [facility] to accept following ERCP.  Patient is lethargic but oriented and able to answer questions appropriately.  Her sister is an RN and her first cousin is a  and they have been extremely helpful in gathering a history.  Very nice and family and great patient support.  Patient's father and stepmother are here and her mother is here and asking about a second opinion.  The others state that they just wanted her transferred back close to home ([facility] with Dr. Ornelas as PCP) for palliative care.  I have reviewed Dr. Jasmyne Chavez's pallitative care note.  Patient has had a drain placed for ascites and comfort care.      Patient was seen in PCP office 8/6/24 for abscess on right breast.  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  She does not take any home meds and only PMH is some anxiety and back pain from scoliosis.  Patient presented to [facility] ED that evening with worsening dyspnea and was found to have multiple lesions in her liver and she was transferred to [facility] for ERCP.  Pathology on liver lesion showed malignant cells.  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  Prognosis was that she may not survive the week and they wanted to be closer  to home.  Patient has a 22 month old son with the same red hair and blue eyes.  Her  is at the bedside and helping her transfer and change her gown.      Remainder of ROS as below.  See assessment and plan below for problem based evaluation         * No surgery found *      Hospital Course:   8/16 arranging for hospice.  discharge once this is arranged today     Goals of Care Treatment Preferences:  Code Status: DNR         Consults:   Consults (From admission, onward)          Status Ordering Provider     Inpatient consult to Social Work  Once        Provider:  (Not yet assigned)    Acknowledged MELVINA BOO            Oncology  * Adenocarcinoma of breast metastatic to liver  No staging noted.  Patient transferred closer to home for palliative care.  Her mother has asked for a second opinion but her father, stepmother, sister who is an RN and first cousin who is like a sister and is a  , agree with palliative care.  Patient requests palliative care.  She did state that the lactulose is terrible and she has refused previous doses so will start rifaximin and all have agreed to am labs to get an idea of where we are at the moment.      Dilauded for pain.  Zofran for N/V and benadryl for anxiety and/or itching.      Patient with condition that if not treated could result in loss of life or bodily function.  Due to co morbidities this patient has complex medical management.    Discharge under hospice care          Palliative Care  Palliative care encounter  I have asked  to see the family inpatient.  Discharge under hospice care        Final Active Diagnoses:    Diagnosis Date Noted POA    PRINCIPAL PROBLEM:  Adenocarcinoma of breast metastatic to liver [C50.919, C78.7] 08/16/2024 Yes    Palliative care encounter [Z51.5] 08/16/2024 Not Applicable      Problems Resolved During this Admission:       Discharged Condition: poor    Disposition: Hospice/Medical Facility    Follow Up:   Follow-up  Information       Braxton Ornelas MD. Schedule an appointment as soon as possible for a visit in 1 week(s).    Specialty: Family Medicine  Why: Please follow up on August 22 at 2:20  Contact information:  [address] [facility] [address]  [phone]                           Patient Instructions:      Diet Adult Regular     Activity as tolerated       Significant Diagnostic Studies: Labs: All labs within the past 24 hours have been reviewed    Pending Diagnostic Studies:       None           Medications:  Reconciled Home Medications:      Medication List        START taking these medications      ondansetron 4 MG Tbdl  Commonly known as: ZOFRAN-ODT  Take 1 tablet (4 mg total) by mouth every 6 (six) hours as needed.     oxyCODONE-acetaminophen  mg per tablet  Commonly known as: PERCOCET  Take 1 tablet by mouth every 4 (four) hours as needed for Pain.     promethazine 25 MG tablet  Commonly known as: PHENERGAN  Take 1 tablet (25 mg total) by mouth every 6 (six) hours as needed for Nausea.     rifAXIMin 550 mg Tab  Commonly known as: XIFAXAN  Take 1 tablet (550 mg total) by mouth 2 (two) times daily.            STOP taking these medications      calcium carbonate 600 mg calcium (1,500 mg) Tab  Commonly known as: OS-JAVED     fish oil-omega-3 fatty acids 300-1,000 mg capsule     HYDROcodone-acetaminophen 7.5-325 mg per tablet  Commonly known as: NORCO     meloxicam 15 MG tablet  Commonly known as: MOBIC     PRENATAL 19 ORAL              Indwelling Lines/Drains at time of discharge:   Lines/Drains/Airways       None                   Time spent on the discharge of patient: >30 minutes         Luis Encinas DO  Department of Hospital Medicine  [facility]

## 2024-08-16 NOTE — PLAN OF CARE
[facility]  Discharge Final Note    Primary Care Provider: Braxton Ornelas MD    Expected Discharge Date: 8/16/2024    Final Discharge Note (most recent)       Final Note - 08/16/24 1108          Final Note    Assessment Type Final Discharge Note     Anticipated Discharge Disposition Hospice/Home        Post-Acute Status    Post-Acute Authorization Hospice     Hospice Status Set-up Complete/Auth obtained     Discharge Delays None known at this time                 Consult for home hospice. Pt returning home with spouse and family. Ss spoke with spouse and choice obtained for [facility] hospice. Referral faxed to stephen with [facility]. Stephne to have dme set up before pt d/c home. Pt to d/c home today.    Important Message from Medicare             Contact Info       Braxton Ornelas MD   Specialty: Family Medicine   Relationship: PCP - General    [address] [place] [address]   Phone: [phone]       Next Steps: Schedule an appointment as soon as possible for a visit in 1 week(s)    Instructions: Please follow up on August 22 at 2:20

## 2024-08-16 NOTE — SUBJECTIVE & OBJECTIVE
Past Medical History:   Diagnosis Date    Anxiety disorder, unspecified     Scoliosis        Past Surgical History:   Procedure Laterality Date     SECTION  2021     SECTION N/A 10/13/2022    Procedure:  SECTION;  Surgeon: Mario Foster MD;  Location: [facility] L&D;  Service: OB/GYN;  Laterality: N/A;    CLAVICLE SURGERY      DIAGNOSTIC LAPAROSCOPY N/A 2024    Procedure: LAPAROSCOPY, DIAGNOSTIC;  Surgeon: Mario Foster MD;  Location: [facility] OR;  Service: OB/GYN;  Laterality: N/A;    DILATION AND CURETTAGE OF UTERUS USING SUCTION N/A 2024    Procedure: DILATION AND CURETTAGE, UTERUS, USING SUCTION;  Surgeon: Mario Foster MD;  Location: [facility] OR;  Service: OB/GYN;  Laterality: N/A;       Review of patient's allergies indicates:   Allergen Reactions    Adhesive Rash     Redness, pulls skin off       Current Facility-Administered Medications on File Prior to Encounter   Medication    [DISCONTINUED] lactated ringers infusion    [DISCONTINUED] lactulose 20 gram/30 mL solution Soln    [DISCONTINUED] ondansetron injection     Current Outpatient Medications on File Prior to Encounter   Medication Sig    calcium carbonate (OS-JAVED) 600 mg calcium (1,500 mg) Tab Take 600 mg by mouth once daily. (Patient not taking: Reported on 2024)    HYDROcodone-acetaminophen (NORCO) 7.5-325 mg per tablet Take 1 tablet by mouth every 6 (six) hours as needed for Pain. (Patient not taking: Reported on 2024)    meloxicam (MOBIC) 15 MG tablet Take 15 mg by mouth once daily.    omega-3 fatty acids/fish oil (FISH OIL-OMEGA-3 FATTY ACIDS) 300-1,000 mg capsule Take 2 capsules by mouth once daily. (Patient not taking: Reported on 2024)    prenatal no115/iron/folic acid (PRENATAL 19 ORAL) Take 1 tablet by mouth once daily. (Patient not taking: Reported on 2024)     Family History       Problem Relation (Age of Onset)    Ankylosing spondylitis Mother    Breast cancer  Maternal Grandfather, Maternal Aunt    Diabetes Paternal Grandfather, Maternal Grandfather    Gestational diabetes Brother    Irritable bowel syndrome Mother    Osteoarthritis Mother    Raynaud syndrome Mother    Sjogren's syndrome Mother          Tobacco Use    Smoking status: Former     Passive exposure: Never    Smokeless tobacco: Never   Substance and Sexual Activity    Alcohol use: Not Currently     Comment: Socially    Drug use: Not Currently    Sexual activity: Yes     Partners: Male     Birth control/protection: None     Review of Systems   Constitutional:  Positive for appetite change and fatigue. Negative for chills, fever and unexpected weight change.   HENT:  Negative for congestion, mouth sores, nosebleeds, sinus pain, sore throat and trouble swallowing.    Respiratory:  Negative for apnea, cough, chest tightness and shortness of breath.    Cardiovascular:  Positive for leg swelling. Negative for chest pain and palpitations.   Gastrointestinal:  Positive for abdominal pain and nausea. Negative for blood in stool, constipation, diarrhea and vomiting.   Endocrine: Negative for polyuria.   Genitourinary:  Negative for decreased urine volume, difficulty urinating, dysuria and frequency.   Musculoskeletal:  Negative for arthralgias, back pain and neck pain.   Skin:  Negative for rash.   Neurological:  Negative for syncope, light-headedness and headaches.   Hematological:  Does not bruise/bleed easily.   Psychiatric/Behavioral:  Positive for confusion. Negative for agitation and suicidal ideas.      Objective:     Vital Signs (Most Recent):  Temp: 97.5 °F (36.4 °C) (08/16/24 0022)  Pulse: 95 (08/16/24 0022)  Resp: 20 (08/16/24 0022)  BP: 108/77 (08/16/24 0022)  SpO2: 99 % (08/16/24 0022) Vital Signs (24h Range):  Temp:  [97.5 °F (36.4 °C)-97.7 °F (36.5 °C)] 97.5 °F (36.4 °C)  Pulse:  [95-96] 95  Resp:  [14-20] 20  SpO2:  [95 %-99 %] 99 %  BP: (107-121)/(74-82) 108/77        There is no height or weight on  file to calculate BMI.     Physical Exam  Vitals and nursing note reviewed. Exam conducted with a chaperone present.   Constitutional:       Appearance: Normal appearance.   HENT:      Head: Atraumatic.      Mouth/Throat:      Mouth: Mucous membranes are moist.      Pharynx: Oropharynx is clear.   Eyes:      Conjunctiva/sclera: Conjunctivae normal.      Pupils: Pupils are equal, round, and reactive to light.   Neck:      Vascular: No carotid bruit.   Cardiovascular:      Rate and Rhythm: Normal rate and regular rhythm.      Pulses: Normal pulses.      Heart sounds: Normal heart sounds.   Pulmonary:      Effort: Pulmonary effort is normal.      Breath sounds: Rales present. No wheezing or rhonchi.   Abdominal:      General: Bowel sounds are normal. There is distension.      Tenderness: There is abdominal tenderness. There is no right CVA tenderness, left CVA tenderness, guarding or rebound.   Musculoskeletal:         General: No deformity or signs of injury. Normal range of motion.      Cervical back: Neck supple.      Right lower leg: Edema present.      Left lower leg: Edema present.   Skin:     General: Skin is warm and dry.      Capillary Refill: Capillary refill takes less than 2 seconds.      Coloration: Skin is not jaundiced or pale.      Findings: No bruising, lesion or rash.   Neurological:      General: No focal deficit present.      Mental Status: She is alert and oriented to person, place, and time.   Psychiatric:         Mood and Affect: Mood normal.              CRANIAL NERVES     CN III, IV, VI   Pupils are equal, round, and reactive to light.       Significant Labs: All pertinent labs within the past 24 hours have been reviewed.    Significant Imaging: I have reviewed all pertinent imaging results/findings within the past 24 hours.

## 2024-08-16 NOTE — ASSESSMENT & PLAN NOTE
No staging noted.  Patient transferred closer to home for palliative care.  Her mother has asked for a second opinion but her father, stepmother, sister who is an RN and first cousin who is like a sister and is a  , agree with palliative care.  Patient requests palliative care.  She did state that the lactulose is terrible and she has refused previous doses so will start rifaximin and all have agreed to am labs to get an idea of where we are at the moment.      Dilauded for pain.  Zofran for N/V and benadryl for anxiety and/or itching.      Patient with condition that if not treated could result in loss of life or bodily function.  Due to co morbidities this patient has complex medical management.

## 2024-08-16 NOTE — HPI
37 yo F transferred from [facility] for palliative care due to agreement with [facility] to accept following ERCP.  Patient is lethargic but oriented and able to answer questions appropriately.  Her sister is an RN and her first cousin is a  and they have been extremely helpful in gathering a history.  Very nice and family and great patient support.  Patient's father and stepmother are here and her mother is here and asking about a second opinion.  The others state that they just wanted her transferred back close to home ([facility] with Dr. Ornelas as PCP) for palliative care.  I have reviewed Dr. Jasmyne Chavez's pallitative care note.  Patient has had a drain placed for ascites and comfort care.      Patient was seen in PCP office 8/6/24 for abscess on right breast.  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  She does not take any home meds and only PMH is some anxiety and back pain from scoliosis.  Patient presented to [facility] ED that evening with worsening dyspnea and was found to have multiple lesions in her liver and she was transferred to [facility] for ERCP.  Pathology on liver lesion showed malignant cells.  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  Prognosis was that she may not survive the week and they wanted to be closer to home.  Patient has a 22 month old son with the same red hair and blue eyes.  Her  is at the bedside and helping her transfer and change her gown.      Remainder of ROS as below.  See assessment and plan below for problem based evaluation

## 2024-08-16 NOTE — CHAPLAIN
I introduced my self and  services to Mrs. Winter, her  and step- father.  Ms. Winter indicated that they had support of many people praying.  As I was leaving, step father said that they would never turn down one more prayer.  I prayed for Ms. Winter and family.   said they are not giving up as long as Ms Winter continues to fight.      Rev Jo Rhoades    [phone]       08/16/24 1022   Clinical Encounter Type   Visit Type Initial Visit   Visit Category General Rounding   Visited With Patient   Number of Family Visited 2   Length of Visit 15 Minutes   Continue Visiting No   Patient Spiritual Encounters   Care Provided Compassionate presence;Prayer support   Family Spiritual Encounters   Care Provided Prayer support

## 2024-08-16 NOTE — ASSESSMENT & PLAN NOTE
No staging noted.  Patient transferred closer to home for palliative care.  Her mother has asked for a second opinion but her father, stepmother, sister who is an RN and first cousin who is like a sister and is a  , agree with palliative care.  Patient requests palliative care.  She did state that the lactulose is terrible and she has refused previous doses so will start rifaximin and all have agreed to am labs to get an idea of where we are at the moment.      Dilauded for pain.  Zofran for N/V and benadryl for anxiety and/or itching.      Patient with condition that if not treated could result in loss of life or bodily function.  Due to co morbidities this patient has complex medical management.    Discharge under hospice care

## (undated) DEVICE — RETRACTOR TRAXI PANNICULUS

## (undated) DEVICE — PACK C SECTION RUSH

## (undated) DEVICE — CANISTER 1200 SUCTION CCMEDI-V

## (undated) DEVICE — SPONGE GAUZE 16PLY 4X4

## (undated) DEVICE — KIT GYN LAPAROSCOPY RUSH

## (undated) DEVICE — GELPOINT MINI KIT ENDOSCOPIC

## (undated) DEVICE — GLOVE PROTEXIS PI SYN SURG 7

## (undated) DEVICE — SUT 4-0 VICRYL / FS-2

## (undated) DEVICE — FILTER ASSEMBLY F/SUCTION D&C

## (undated) DEVICE — SET DISPOSABLE COLLECTION

## (undated) DEVICE — TRAY VAG PREP

## (undated) DEVICE — SUT 0 VICRYL / UR6 (J603)

## (undated) DEVICE — APPLICATOR CHLORAPREP ORN 26ML

## (undated) DEVICE — SOL NACL IRR 1000ML BTL

## (undated) DEVICE — WARMER BLUE HEAT SCOPE 3-12MM

## (undated) DEVICE — GOWN POLY REINF BRTH SLV XL

## (undated) DEVICE — VACURETTE 8MM CURVED

## (undated) DEVICE — SUT 2/0 27IN PLAIN GUT CT

## (undated) DEVICE — DISSECTOR KITTNER 5MM T 45CM S

## (undated) DEVICE — SUT VICRYL PLUS 1 CTX 36IN

## (undated) DEVICE — DRAPE L&D NON STERILE (ORDER 63957)

## (undated) DEVICE — ELECTRODE REM PLYHSV RETURN 9

## (undated) DEVICE — STRIP MEDI WND CLSR 1/4X4IN

## (undated) DEVICE — GLOVE SENSICARE PI GRN 7

## (undated) DEVICE — GLOVE SENSICARE PI SURG 6.5

## (undated) DEVICE — GLOVE PROTEXIS PI SYN SURG 6.5

## (undated) DEVICE — GLOVE SENSICARE PI GRN 6.5

## (undated) DEVICE — STAPLER SKIN SUBCUTICULAR

## (undated) DEVICE — HEMOSTAT SURGICEL 4X8IN